# Patient Record
Sex: FEMALE | Race: WHITE | NOT HISPANIC OR LATINO | Employment: FULL TIME | URBAN - METROPOLITAN AREA
[De-identification: names, ages, dates, MRNs, and addresses within clinical notes are randomized per-mention and may not be internally consistent; named-entity substitution may affect disease eponyms.]

---

## 2017-02-27 ENCOUNTER — OFFICE VISIT (OUTPATIENT)
Dept: OBSTETRICS AND GYNECOLOGY | Age: 22
End: 2017-02-27

## 2017-02-27 VITALS
DIASTOLIC BLOOD PRESSURE: 78 MMHG | BODY MASS INDEX: 33.77 KG/M2 | HEIGHT: 60 IN | SYSTOLIC BLOOD PRESSURE: 120 MMHG | WEIGHT: 172 LBS

## 2017-02-27 DIAGNOSIS — Z30.40 CONTRACEPTIVE USE: ICD-10-CM

## 2017-02-27 DIAGNOSIS — A60.9 HSV (HERPES SIMPLEX VIRUS) ANOGENITAL INFECTION: ICD-10-CM

## 2017-02-27 DIAGNOSIS — Z01.419 ENCOUNTER FOR GYNECOLOGICAL EXAMINATION: Primary | ICD-10-CM

## 2017-02-27 PROCEDURE — 99395 PREV VISIT EST AGE 18-39: CPT | Performed by: OBSTETRICS & GYNECOLOGY

## 2017-02-27 RX ORDER — ETONOGESTREL AND ETHINYL ESTRADIOL 11.7; 2.7 MG/1; MG/1
1 INSERT, EXTENDED RELEASE VAGINAL
Qty: 1 EACH | Refills: 12 | Status: SHIPPED | OUTPATIENT
Start: 2017-02-27 | End: 2018-02-27

## 2017-02-27 RX ORDER — VALACYCLOVIR HYDROCHLORIDE 500 MG/1
500 TABLET, FILM COATED ORAL 2 TIMES DAILY
Qty: 6 TABLET | Refills: 4 | Status: SHIPPED | OUTPATIENT
Start: 2017-02-27 | End: 2017-03-02

## 2017-02-27 NOTE — PROGRESS NOTES
"Subjective     Chief Complaint   Patient presents with   • Annual Exam     no prob       History of Present Illness    Sandra Valadez is a 21 y.o.  who presents for annual exam.  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         Current contraception: condoms  History of abnormal Pap smear: yes - f/u negative  Received Gardasil immunization: no  Perform regular self breast exam: no  Family history of uterine or ovarian cancer: no  Family History of colon cancer: no  Family history of breast cancer: no    Mammogram: not indicated.  Colonoscopy: not indicated.  DEXA: not indicated.    Exercise: moderately active      The following portions of the patient's history were reviewed and updated as appropriate: allergies, current medications, past family history, past medical history, past social history, past surgical history and problem list.    Review of Systems    A comprehensive review of systems was negative.     Objective   Physical Exam    Visit Vitals   • /78   • Ht 60\" (152.4 cm)   • Wt 172 lb (78 kg)   • LMP 2017   • Breastfeeding No   • BMI 33.59 kg/m2       General:   alert, appears stated age, cooperative and no distress   Neck: no asymmetry, masses, or scars and thyroid normal to palpation   Heart: regular rate and rhythm, S1, S2 normal, no murmur, click, rub or gallop   Lungs: clear to auscultation bilaterally   Abdomen: soft, non-tender, without masses or organomegaly   Breast: inspection negative, no nipple discharge or bleeding, no masses or nodularity palpable   Vulva: normal, Bartholin's, Urethra, [place]'s normal   Vagina: " normal mucosa, normal discharge   Cervix: no lesions   Uterus: normal size, mobile, non-tender   Adnexa: normal adnexa and no mass, fullness, tenderness   Rectal: not indicated     Assessment/Plan   Sandra was seen today for annual exam.    Diagnoses and all orders for this visit:    Encounter for gynecological examination  -     IGP, CtNg, Rfx Aptima HPV ASCU    HSV (herpes simplex virus) anogenital infection  -     valACYclovir (VALTREX) 500 MG tablet; Take 1 tablet by mouth 2 (Two) Times a Day for 3 days.    Contraceptive use  -     etonogestrel-ethinyl estradiol (NUVARING) 0.12-0.015 MG/24HR vaginal ring; Insert 1 each into the vagina Every 28 (Twenty-Eight) Days.        All questions answered.  Breast self exam technique reviewed and patient encouraged to perform self-exam monthly.  Discussed healthy lifestyle modifications.  Recommended 30 minutes of aerobic exercise five times per week.    Pt aware risks of nuvaring to include DVT/PE/CVE, HTN and gallbladder disease, rx given with instruction on re-starting    Pt requests prn rx for valtrex, she denies any recent outbreaks but wants available

## 2017-03-01 LAB
C TRACH RRNA CVX QL NAA+PROBE: NEGATIVE
CONV .: NORMAL
CYTOLOGIST CVX/VAG CYTO: NORMAL
CYTOLOGY CVX/VAG DOC THIN PREP: NORMAL
DX ICD CODE: NORMAL
HIV 1 & 2 AB SER-IMP: NORMAL
N GONORRHOEA RRNA CVX QL NAA+PROBE: NEGATIVE
OTHER STN SPEC: NORMAL
PATH REPORT.FINAL DX SPEC: NORMAL
STAT OF ADQ CVX/VAG CYTO-IMP: NORMAL

## 2017-03-02 ENCOUNTER — TELEPHONE (OUTPATIENT)
Dept: OBSTETRICS AND GYNECOLOGY | Age: 22
End: 2017-03-02

## 2017-03-02 NOTE — TELEPHONE ENCOUNTER
----- Message from Noemi Mittal MD sent at 3/1/2017  6:10 PM EST -----  Negative pap and cultures, results routed to MA to call pt

## 2018-03-05 ENCOUNTER — OFFICE VISIT (OUTPATIENT)
Dept: OBSTETRICS AND GYNECOLOGY | Age: 23
End: 2018-03-05

## 2018-03-05 VITALS
DIASTOLIC BLOOD PRESSURE: 68 MMHG | SYSTOLIC BLOOD PRESSURE: 120 MMHG | WEIGHT: 162 LBS | HEIGHT: 60 IN | BODY MASS INDEX: 31.8 KG/M2

## 2018-03-05 DIAGNOSIS — Z01.419 VISIT FOR GYNECOLOGIC EXAMINATION: Primary | ICD-10-CM

## 2018-03-05 DIAGNOSIS — Z11.3 SCREEN FOR STD (SEXUALLY TRANSMITTED DISEASE): ICD-10-CM

## 2018-03-05 DIAGNOSIS — Z30.44 ENCOUNTER FOR SURVEILLANCE OF VAGINAL RING HORMONAL CONTRACEPTIVE DEVICE: ICD-10-CM

## 2018-03-05 DIAGNOSIS — A60.9 HSV (HERPES SIMPLEX VIRUS) ANOGENITAL INFECTION: ICD-10-CM

## 2018-03-05 PROCEDURE — 99395 PREV VISIT EST AGE 18-39: CPT | Performed by: OBSTETRICS & GYNECOLOGY

## 2018-03-05 RX ORDER — ETONOGESTREL AND ETHINYL ESTRADIOL 11.7; 2.7 MG/1; MG/1
1 INSERT, EXTENDED RELEASE VAGINAL
COMMUNITY
End: 2018-03-05 | Stop reason: SDUPTHER

## 2018-03-05 RX ORDER — VALACYCLOVIR HYDROCHLORIDE 500 MG/1
500 TABLET, FILM COATED ORAL 2 TIMES DAILY
Qty: 6 TABLET | Refills: 2 | Status: SHIPPED | OUTPATIENT
Start: 2018-03-05 | End: 2018-03-08

## 2018-03-05 RX ORDER — ETONOGESTREL AND ETHINYL ESTRADIOL 11.7; 2.7 MG/1; MG/1
1 INSERT, EXTENDED RELEASE VAGINAL
Qty: 1 EACH | Refills: 12 | Status: SHIPPED | OUTPATIENT
Start: 2018-03-05 | End: 2019-04-15 | Stop reason: SDUPTHER

## 2018-03-05 NOTE — PROGRESS NOTES
"Subjective     Chief Complaint   Patient presents with   • Annual Exam     no problems       History of Present Illness    Sandra Valadez is a 22 y.o.  who presents for annual exam.  Her menses are regular every 28-30 days, lasting 4-7 days, dysmenorrhea none   Using Nuvaring and having regular menses  Working in ER as RN and enjoying it at  [employer]  Only rare HSV outbreak, wants to continue valtrex prn only  Obstetric History:  OB History      Para Term  AB Living    4 3 3  1 3    SAB TAB Ectopic Multiple Live Births    1    3         Menstrual History:     Patient's last menstrual period was 2018.         Current contraception: NuvaRing vaginal inserts  History of abnormal Pap smear: yes - f/u negative  Received Gardasil immunization: no  Perform regular self breast exam: yes - occ  Family history of uterine or ovarian cancer: no  Family History of colon cancer: no  Family history of breast cancer: no    Mammogram: not indicated.  Colonoscopy: not indicated.  DEXA: not indicated.    Exercise: moderately active  Calcium/Vitamin D: adequate intake    The following portions of the patient's history were reviewed and updated as appropriate: allergies, current medications, past family history, past medical history, past social history, past surgical history and problem list.    Review of Systems    Review of Systems   Constitutional: Negative for fatigue.   Respiratory: Negative for shortness of breath.    Gastrointestinal: Negative for abdominal pain.   Genitourinary: Negative for dysuria. negative for abnormal bleeding  Neurological: Negative for headaches.   Psychiatric/Behavioral: Negative for dysphoric mood.         Objective   Physical Exam    /68  Ht 152.4 cm (60\")  Wt 73.5 kg (162 lb)  LMP 2018 Comment: nuvaring  BMI 31.64 kg/m2    General:   alert, appears stated age, cooperative and no distress   Neck: no asymmetry, masses, or scars and thyroid normal to " palpation   Heart: regular rate and rhythm, S1, S2 normal, no murmur, click, rub or gallop   Lungs: clear to auscultation bilaterally   Abdomen: soft, non-tender, without masses or organomegaly   Breast: inspection negative, no nipple discharge or bleeding, no masses or nodularity palpable and no axillary adenopathy, bilateral nipples have piercings   Vulva: normal, Bartholin's, Urethra, [place]'s normal   Vagina: normal mucosa, normal discharge   Cervix: no lesions and pap done   Uterus: normal size, mobile, non-tender, normal shape and consistency   Adnexa: normal adnexa and no mass, fullness, tenderness   Rectal: not indicated     Assessment/Plan   Sandra was seen today for annual exam.    Diagnoses and all orders for this visit:    Visit for gynecologic examination    Screen for STD (sexually transmitted disease)  -     IGP, CtNg, Rfx Aptima HPV ASCU  -     HIV-1 / O / 2 Ag / Antibody 4th Generation  -     RPR  -     Hepatitis B Surface Antigen    HSV (herpes simplex virus) anogenital infection  -     valACYclovir (VALTREX) 500 MG tablet; Take 1 tablet by mouth 2 (Two) Times a Day for 3 days.    Encounter for surveillance of vaginal ring hormonal contraceptive device  -     etonogestrel-ethinyl estradiol (NUVARING) 0.12-0.015 MG/24HR vaginal ring; Insert 1 each into the vagina Every 28 (Twenty-Eight) Days.        All questions answered.  Breast self exam technique reviewed and patient encouraged to perform self-exam monthly.  Discussed healthy lifestyle modifications.  Recommended 30 minutes of aerobic exercise five times per week.    Pt wants to continue Nuvaring for contraception. She understands risks to include DTV/PE/HTN and gallbladder disease    Advised pt that we will call with results, advised she call if she does not receive pap, cervical screening and bloodwork results

## 2018-03-06 ENCOUNTER — TELEPHONE (OUTPATIENT)
Dept: OBSTETRICS AND GYNECOLOGY | Age: 23
End: 2018-03-06

## 2018-03-06 LAB
HBV SURFACE AG SERPL QL IA: NEGATIVE
HIV 1+2 AB+HIV1 P24 AG SERPL QL IA: NON REACTIVE
RPR SER QL: NORMAL

## 2018-03-06 NOTE — TELEPHONE ENCOUNTER
----- Message from Noemi Mittal MD sent at 3/6/2018  8:55 AM EST -----  Call Sandra, HIV, Hep B and RPR are negative/non-reactive

## 2018-03-08 LAB
C TRACH RRNA CVX QL NAA+PROBE: NEGATIVE
CONV .: NORMAL
CYTOLOGIST CVX/VAG CYTO: NORMAL
CYTOLOGY CVX/VAG DOC THIN PREP: NORMAL
DX ICD CODE: NORMAL
HIV 1 & 2 AB SER-IMP: NORMAL
Lab: NORMAL
N GONORRHOEA RRNA CVX QL NAA+PROBE: NEGATIVE
OTHER STN SPEC: NORMAL
PATH REPORT.FINAL DX SPEC: NORMAL
STAT OF ADQ CVX/VAG CYTO-IMP: NORMAL

## 2018-03-12 ENCOUNTER — TELEPHONE (OUTPATIENT)
Dept: OBSTETRICS AND GYNECOLOGY | Age: 23
End: 2018-03-12

## 2018-03-12 NOTE — TELEPHONE ENCOUNTER
----- Message from Noemi Mittal MD sent at 3/12/2018 12:41 AM EDT -----  Call pt, pap, CZ and GC are negative

## 2018-04-13 ENCOUNTER — TELEPHONE (OUTPATIENT)
Dept: OBSTETRICS AND GYNECOLOGY | Age: 23
End: 2018-04-13

## 2018-04-13 RX ORDER — VALACYCLOVIR HYDROCHLORIDE 500 MG/1
500 TABLET, FILM COATED ORAL DAILY
Qty: 30 TABLET | Refills: 12 | Status: SHIPPED | OUTPATIENT
Start: 2018-04-13 | End: 2018-05-13

## 2018-04-13 NOTE — TELEPHONE ENCOUNTER
Dr Domínguez pt requests refill of Valtrex 500 takes for suppression, sent to pharm on file, nka. Please advise

## 2019-04-15 ENCOUNTER — OFFICE VISIT (OUTPATIENT)
Dept: OBSTETRICS AND GYNECOLOGY | Age: 24
End: 2019-04-15

## 2019-04-15 VITALS
WEIGHT: 172 LBS | BODY MASS INDEX: 33.77 KG/M2 | HEIGHT: 60 IN | SYSTOLIC BLOOD PRESSURE: 122 MMHG | DIASTOLIC BLOOD PRESSURE: 72 MMHG

## 2019-04-15 DIAGNOSIS — Z01.419 WELL WOMAN EXAM WITH ROUTINE GYNECOLOGICAL EXAM: ICD-10-CM

## 2019-04-15 DIAGNOSIS — Z30.44 ENCOUNTER FOR SURVEILLANCE OF VAGINAL RING HORMONAL CONTRACEPTIVE DEVICE: ICD-10-CM

## 2019-04-15 DIAGNOSIS — Z11.3 SCREEN FOR STD (SEXUALLY TRANSMITTED DISEASE): Primary | ICD-10-CM

## 2019-04-15 DIAGNOSIS — A60.9 HSV (HERPES SIMPLEX VIRUS) ANOGENITAL INFECTION: ICD-10-CM

## 2019-04-15 PROCEDURE — 99395 PREV VISIT EST AGE 18-39: CPT | Performed by: PHYSICIAN ASSISTANT

## 2019-04-15 RX ORDER — VALACYCLOVIR HYDROCHLORIDE 500 MG/1
500 TABLET, FILM COATED ORAL DAILY
Qty: 30 TABLET | Refills: 1 | Status: SHIPPED | OUTPATIENT
Start: 2019-04-15 | End: 2019-05-15

## 2019-04-15 RX ORDER — ETONOGESTREL AND ETHINYL ESTRADIOL 11.7; 2.7 MG/1; MG/1
1 INSERT, EXTENDED RELEASE VAGINAL
Qty: 3 EACH | Refills: 4 | Status: SHIPPED | OUTPATIENT
Start: 2019-04-15 | End: 2019-07-31

## 2019-04-15 NOTE — PROGRESS NOTES
Subjective     Chief Complaint   Patient presents with   • Gynecologic Exam     annual exam. last pap 2018 neg/no hpv       History of Present Illness    Sandra Valadez is a 23 y.o.  who presents for annual exam.    Pt here for her annual  She is doing well  Would like to have std testing done, likes to do it routinely  Also requests a pap smear, remote h/o abnormal  Not recently SA, it has been since last summer  Denies any issues otherwise    Has 3 children, boy and 2 girls.  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 Patient is not currently having periods (Reason: Oral contraceptives).         Current contraception: NuvaRing vaginal inserts  History of abnormal Pap smear: no  Received Gardasil immunization: no declines  Perform regular self breast exam: yes - occl  Family history of uterine or ovarian cancer: no  Family History of colon cancer: no  Family history of breast cancer: no    Mammogram: not indicated.  Colonoscopy: not indicated.  DEXA: not indicated.    Exercise: moderately active  Calcium/Vitamin D: adequate intake    The following portions of the patient's history were reviewed and updated as appropriate: allergies, current medications, past family history, past medical history, past social history, past surgical history and problem list.    Review of Systems   All other systems reviewed and are negative.      Review of Systems   Constitutional: Negative for fatigue.  "  Respiratory: Negative for shortness of breath.    Gastrointestinal: Negative for abdominal pain.   Genitourinary: Negative for dysuria.   Neurological: Negative for headaches.   Psychiatric/Behavioral: Negative for dysphoric mood.         Objective   Physical Exam    /72   Ht 152.4 cm (60\")   Wt 78 kg (172 lb)   Breastfeeding? No   BMI 33.59 kg/m²     General:   alert, appears stated age and cooperative   Neck: no adenopathy and thyroid normal to palpation   Heart: regular rate and rhythm   Lungs: clear to auscultation bilaterally   Abdomen: soft and nontender   Breast: inspection negative, no nipple discharge or bleeding, no masses or nodularity palpable and b nipple piercing   Vulva: normal   Vagina: normal mucosa, normal discharge   Cervix: no lesions   Uterus: normal size, non-tender   Adnexa: normal adnexa and no mass, fullness, tenderness   Rectal: not indicated     Assessment/Plan   Sandra was seen today for gynecologic exam.    Diagnoses and all orders for this visit:    Screen for STD (sexually transmitted disease)  -     RPR, Rfx Qn RPR / Confirm TP  -     Hepatitis B Surface Antigen  -     Hepatitis C Antibody  -     HIV-1 / O / 2 Ag / Antibody 4th Generation  -     IGP, CtNg, Rfx Aptima HPV ASCU    Well woman exam with routine gynecological exam  -     IGP, CtNg, Rfx Aptima HPV ASCU    Encounter for surveillance of vaginal ring hormonal contraceptive device  -     etonogestrel-ethinyl estradiol (NUVARING) 0.12-0.015 MG/24HR vaginal ring; Insert 1 each into the vagina Every 28 (Twenty-Eight) Days.    HSV (herpes simplex virus) anogenital infection    Other orders  -     valACYclovir (VALTREX) 500 MG tablet; Take 1 tablet by mouth Daily for 30 days.        All questions answered.  Breast self exam technique reviewed and patient encouraged to perform self-exam monthly.  Discussed healthy lifestyle modifications.  Recommended 30 minutes of aerobic exercise five times per week.  Discussed calcium " needs to prevent osteoporosis.    Pap ordered with STD testing, serum and pap  Valtrex sent in just in case for pt, no recent outbreaks  nuva ring refilled  Enc condoms with new partner  Call for any problems

## 2019-04-16 ENCOUNTER — TELEPHONE (OUTPATIENT)
Dept: OBSTETRICS AND GYNECOLOGY | Age: 24
End: 2019-04-16

## 2019-04-16 LAB
HBV SURFACE AG SERPL QL IA: NEGATIVE
HCV AB S/CO SERPL IA: <0.1 S/CO RATIO (ref 0–0.9)
HIV 1+2 AB+HIV1 P24 AG SERPL QL IA: NON REACTIVE
RPR SER QL: NON REACTIVE

## 2019-04-22 ENCOUNTER — OFFICE (OUTPATIENT)
Dept: URBAN - METROPOLITAN AREA CLINIC 42 | Facility: CLINIC | Age: 24
End: 2019-04-22

## 2019-04-22 VITALS — HEART RATE: 79 BPM | WEIGHT: 164 LBS | DIASTOLIC BLOOD PRESSURE: 87 MMHG | SYSTOLIC BLOOD PRESSURE: 127 MMHG

## 2019-04-22 DIAGNOSIS — R10.30 LOWER ABDOMINAL PAIN, UNSPECIFIED: ICD-10-CM

## 2019-04-22 DIAGNOSIS — R19.7 DIARRHEA, UNSPECIFIED: ICD-10-CM

## 2019-04-22 DIAGNOSIS — R19.4 CHANGE IN BOWEL HABIT: ICD-10-CM

## 2019-04-22 DIAGNOSIS — K59.00 CONSTIPATION, UNSPECIFIED: ICD-10-CM

## 2019-04-22 PROCEDURE — 99204 OFFICE O/P NEW MOD 45 MIN: CPT

## 2019-05-08 ENCOUNTER — TELEPHONE (OUTPATIENT)
Dept: OBSTETRICS AND GYNECOLOGY | Age: 24
End: 2019-05-08

## 2019-05-10 ENCOUNTER — OFFICE VISIT (OUTPATIENT)
Dept: OBSTETRICS AND GYNECOLOGY | Age: 24
End: 2019-05-10

## 2019-05-10 VITALS
SYSTOLIC BLOOD PRESSURE: 130 MMHG | BODY MASS INDEX: 33.18 KG/M2 | WEIGHT: 169 LBS | HEIGHT: 60 IN | DIASTOLIC BLOOD PRESSURE: 80 MMHG

## 2019-05-10 DIAGNOSIS — Z77.21 EXPOSURE TO BLOOD OR BODY FLUID: Primary | ICD-10-CM

## 2019-05-10 PROCEDURE — 99212 OFFICE O/P EST SF 10 MIN: CPT | Performed by: NURSE PRACTITIONER

## 2019-05-10 NOTE — PROGRESS NOTES
"Subjective   Sandra Valadez is a 24 y.o. female is being seen today for   Chief Complaint   Patient presents with   • Vaginal Discharge     Pt c/o some white discharge. She did use some OTC Monistat and it did seem to help. She recently had a condom break during IC and wants to make sure it's not something other than yeast.    .    History of Present Illness     Patient here for vaginal culture  She had a condom break last week and then some vaginal itching  She did use monistat and it helped but she wanted to check for STDs as well  She is using nuvaring for BC  She declines serum testing    The following portions of the patient's history were reviewed and updated as appropriate: allergies, current medications, past family history, past medical history, past social history, past surgical history and problem list.    /80   Ht 152.4 cm (60\")   Wt 76.7 kg (169 lb)   LMP 04/19/2019   BMI 33.01 kg/m²         Review of Systems   Constitutional: Negative.    HENT: Negative.    Eyes: Negative.    Respiratory: Negative.    Cardiovascular: Negative.    Gastrointestinal: Negative.    Endocrine: Negative.    Genitourinary: Positive for vaginal discharge.   Musculoskeletal: Negative.    Skin: Negative.    Allergic/Immunologic: Negative.    Neurological: Negative.    Hematological: Negative.    Psychiatric/Behavioral: Negative.        Objective   Physical Exam   Constitutional: She is oriented to person, place, and time. She appears well-developed and well-nourished.   Genitourinary: Vagina normal and uterus normal. Uterus is not tender. Cervix exhibits no motion tenderness, no discharge and no friability.   Neurological: She is alert and oriented to person, place, and time.   Skin: Skin is warm and dry.   Psychiatric: She has a normal mood and affect.         Assessment/Plan   Sandra was seen today for vaginal discharge.    Diagnoses and all orders for this visit:    Exposure to blood or body fluid  -     NuSwab VG+ " - Swab, Vagina        Vaginal culture sent.  Will call with results.

## 2019-05-13 LAB
A VAGINAE DNA VAG QL NAA+PROBE: ABNORMAL SCORE
BVAB2 DNA VAG QL NAA+PROBE: ABNORMAL SCORE
C ALBICANS DNA VAG QL NAA+PROBE: POSITIVE
C GLABRATA DNA VAG QL NAA+PROBE: NEGATIVE
C TRACH RRNA SPEC QL NAA+PROBE: NEGATIVE
MEGA1 DNA VAG QL NAA+PROBE: ABNORMAL SCORE
N GONORRHOEA RRNA SPEC QL NAA+PROBE: NEGATIVE
T VAGINALIS RRNA SPEC QL NAA+PROBE: POSITIVE

## 2019-05-14 ENCOUNTER — TELEPHONE (OUTPATIENT)
Dept: OBSTETRICS AND GYNECOLOGY | Age: 24
End: 2019-05-14

## 2019-05-14 RX ORDER — METRONIDAZOLE 500 MG/1
2000 TABLET ORAL ONCE
Qty: 4 TABLET | Refills: 0 | Status: SHIPPED | OUTPATIENT
Start: 2019-05-14 | End: 2019-05-14

## 2019-05-14 RX ORDER — FLUCONAZOLE 150 MG/1
150 TABLET ORAL
Qty: 2 TABLET | Refills: 0 | Status: SHIPPED | OUTPATIENT
Start: 2019-05-14 | End: 2019-07-31

## 2019-05-14 NOTE — TELEPHONE ENCOUNTER
----- Message from AGUSTÍN Peres sent at 5/14/2019  9:38 AM EDT -----  Notify + yeast and trich.  Partner needs to be treated as well and they should abstain from IC x 1 week after treatment. Medications have been sent to pharmacy.

## 2019-05-16 ENCOUNTER — TELEPHONE (OUTPATIENT)
Dept: OBSTETRICS AND GYNECOLOGY | Age: 24
End: 2019-05-16

## 2019-05-16 RX ORDER — TINIDAZOLE 500 MG/1
2 TABLET ORAL ONCE
Qty: 4 TABLET | Refills: 0 | Status: SHIPPED | OUTPATIENT
Start: 2019-05-16 | End: 2019-05-16

## 2019-05-16 NOTE — TELEPHONE ENCOUNTER
WAS PRESCRIBED FLAGYL, BUT CANNOT KEEP IT DOWN. I RECOMMENDED SHE TAKE WITH FOOD BUT SHE IS PREPPING FOR A COLONOSCOPY AND THE FLAGYL JUST REALLY UPSETS HER STOMACH. WHAT TO DO? ANTI-NAUSEA? CHANGE RX?

## 2019-05-16 NOTE — TELEPHONE ENCOUNTER
I would wait until after the colonoscopy to take if she is prepping now.  I will send in a new prescription and she should take with food.

## 2019-05-17 ENCOUNTER — ON CAMPUS - OUTPATIENT (OUTPATIENT)
Dept: URBAN - METROPOLITAN AREA HOSPITAL 91 | Facility: HOSPITAL | Age: 24
End: 2019-05-17
Payer: COMMERCIAL

## 2019-05-17 DIAGNOSIS — R15.2 FECAL URGENCY: ICD-10-CM

## 2019-05-17 DIAGNOSIS — R19.7 DIARRHEA, UNSPECIFIED: ICD-10-CM

## 2019-05-17 PROCEDURE — 45380 COLONOSCOPY AND BIOPSY: CPT

## 2019-06-05 ENCOUNTER — TELEPHONE (OUTPATIENT)
Dept: OBSTETRICS AND GYNECOLOGY | Age: 24
End: 2019-06-05

## 2019-06-05 RX ORDER — FLUCONAZOLE 150 MG/1
150 TABLET ORAL ONCE
Qty: 2 TABLET | Refills: 0 | Status: SHIPPED | OUTPATIENT
Start: 2019-06-05 | End: 2019-06-05

## 2019-06-05 NOTE — TELEPHONE ENCOUNTER
Dr Mittal pt requests Diflucan be swent [facility] for yeast infection as she reports vaginal itching & a thick discharge.

## 2019-07-19 ENCOUNTER — TELEPHONE (OUTPATIENT)
Dept: OBSTETRICS AND GYNECOLOGY | Age: 24
End: 2019-07-19

## 2019-07-19 RX ORDER — VALACYCLOVIR HYDROCHLORIDE 500 MG/1
500 TABLET, FILM COATED ORAL DAILY
Qty: 30 TABLET | Refills: 12 | Status: SHIPPED | OUTPATIENT
Start: 2019-07-19 | End: 2019-08-18

## 2019-07-19 NOTE — TELEPHONE ENCOUNTER
Dr Mittal pt seen Arabella back in April and was prescribed 2 months of Valtrex. Pt states Dr Mittal usually prescribes 12 months to hold her until her next annual. Pt tries to take it daily to prevent outbreaks. Pt she has enough for the week, but wanted to call in before she ran out. Please Advise

## 2019-07-31 ENCOUNTER — OFFICE VISIT (OUTPATIENT)
Dept: OBSTETRICS AND GYNECOLOGY | Age: 24
End: 2019-07-31

## 2019-07-31 ENCOUNTER — PROCEDURE VISIT (OUTPATIENT)
Dept: OBSTETRICS AND GYNECOLOGY | Age: 24
End: 2019-07-31

## 2019-07-31 VITALS
HEIGHT: 60 IN | DIASTOLIC BLOOD PRESSURE: 90 MMHG | SYSTOLIC BLOOD PRESSURE: 138 MMHG | WEIGHT: 163.2 LBS | BODY MASS INDEX: 32.04 KG/M2

## 2019-07-31 DIAGNOSIS — R35.0 URINARY FREQUENCY: ICD-10-CM

## 2019-07-31 DIAGNOSIS — N92.6 IRREGULAR MENSES: Primary | ICD-10-CM

## 2019-07-31 DIAGNOSIS — N92.3 INTERMENSTRUAL SPOTTING: Primary | ICD-10-CM

## 2019-07-31 DIAGNOSIS — Z13.9 SPECIAL SCREENING: ICD-10-CM

## 2019-07-31 PROBLEM — N92.0 INTERMENSTRUAL SPOTTING: Status: ACTIVE | Noted: 2019-07-31

## 2019-07-31 LAB
B-HCG UR QL: NEGATIVE
BILIRUB BLD-MCNC: NEGATIVE MG/DL
GLUCOSE UR STRIP-MCNC: NEGATIVE MG/DL
INTERNAL NEGATIVE CONTROL: NEGATIVE
INTERNAL POSITIVE CONTROL: POSITIVE
KETONES UR QL: NEGATIVE
LEUKOCYTE EST, POC: NEGATIVE
Lab: NORMAL
NITRITE UR-MCNC: NEGATIVE MG/ML
PH UR: 9 [PH] (ref 5–8)
PROT UR STRIP-MCNC: NEGATIVE MG/DL
RBC # UR STRIP: NEGATIVE /UL
SP GR UR: 1.01 (ref 1–1.03)
UROBILINOGEN UR QL: NORMAL

## 2019-07-31 PROCEDURE — 76830 TRANSVAGINAL US NON-OB: CPT | Performed by: OBSTETRICS & GYNECOLOGY

## 2019-07-31 PROCEDURE — 99213 OFFICE O/P EST LOW 20 MIN: CPT | Performed by: NURSE PRACTITIONER

## 2019-07-31 PROCEDURE — 81025 URINE PREGNANCY TEST: CPT | Performed by: NURSE PRACTITIONER

## 2019-07-31 PROCEDURE — 81002 URINALYSIS NONAUTO W/O SCOPE: CPT | Performed by: NURSE PRACTITIONER

## 2019-07-31 RX ORDER — METRONIDAZOLE 500 MG/1
2000 TABLET ORAL ONCE
Qty: 4 TABLET | Refills: 0 | Status: SHIPPED | OUTPATIENT
Start: 2019-07-31 | End: 2019-07-31

## 2019-07-31 NOTE — PROGRESS NOTES
[facility]  Routine Annual Visit    2019    Patient: Sandra Valadez          MR#:[id number]      History of Present Illness    24 y.o. female  who presents with complaints of intermenstrual spotting and spotting with intercourse since discontinuing her nuvaring 3 months ago. She was dx with  trich 5/10/19. Pt reports she and partner were treated before resuming IC. Sandra denies vaginal discharge, itching, odor. Denies pelvic pain, fever. She does report urinary frequency and was treated with bactrim for possible UTI but states culture returned negative. She is not using contraception as desires pregnancy. Daily prenatal vitamin recommended. Advised discontinue phentermine. Pap negative in April.    Patient's last menstrual period was 2019 (exact date).  Obstetric History:  OB History      Para Term  AB Living    4 3 3   1 3    SAB TAB Ectopic Molar Multiple Live Births    1         3         Menstrual History:     Patient's last menstrual period was 2019 (exact date).       Sexual History:       ________________________________________  Patient Active Problem List   Diagnosis   • HSV (herpes simplex virus) anogenital infection       Past Medical History:   Diagnosis Date   • Abnormal Pap smear of cervix    • Chlamydia    • Herpes        Past Surgical History:   Procedure Laterality Date   •  SECTION         Social History     Tobacco Use   Smoking Status Never Smoker   Smokeless Tobacco Never Used       Family History   Problem Relation Age of Onset   • Diabetes Maternal Uncle        Prior to Admission medications    Medication Sig Start Date End Date Taking? Authorizing Provider   phentermine 30 MG capsule Take 30 mg by mouth every morning.   Yes Emergency, Nurse Shelby, RN   valACYclovir (VALTREX) 500 MG tablet Take 1 tablet by mouth Daily for 30 days. 19 Yes Noemi Mittal MD   etonogestrel-ethinyl estradiol (NUVARING) 0.12-0.015  "MG/24HR vaginal ring Insert 1 each into the vagina Every 28 (Twenty-Eight) Days. 4/15/19   Arabella Clancy PA   fluconazole (DIFLUCAN) 150 MG tablet Take 1 tablet by mouth Every 3 (Three) Days. 5/14/19   Stephanie Elizabeth APRN       The following portions of the patient's history were reviewed and updated as appropriate: allergies, current medications, past family history, past medical history, past social history, past surgical history and problem list.    Review of Systems   Constitutional: Negative for chills, fatigue and fever.   Gastrointestinal: Negative for abdominal distention, abdominal pain, constipation and diarrhea.   Genitourinary: Positive for frequency and menstrual problem. Negative for decreased urine volume, difficulty urinating, dyspareunia, dysuria, enuresis, flank pain, genital sores, hematuria, pelvic pain, urgency, vaginal bleeding, vaginal discharge and vaginal pain.       Objective   Physical Exam    /90   Ht 152.4 cm (60\")   Wt 74 kg (163 lb 3.2 oz)   LMP 07/16/2019 (Exact Date)   Breastfeeding? No   BMI 31.87 kg/m²    BP Readings from Last 3 Encounters:   07/31/19 138/90   05/10/19 130/80   04/15/19 122/72      Wt Readings from Last 3 Encounters:   07/31/19 74 kg (163 lb 3.2 oz)   05/10/19 76.7 kg (169 lb)   04/15/19 78 kg (172 lb)        BMI: Estimated body mass index is 31.87 kg/m² as calculated from the following:    Height as of this encounter: 152.4 cm (60\").    Weight as of this encounter: 74 kg (163 lb 3.2 oz).      General:   alert, appears stated age, cooperative and no distress                   Vulva: normal   Vagina: normal mucosa, normal discharge   Cervix: no cervical motion tenderness and no lesions   Uterus: normal size, mobile, non-tender or normal shape and consistency   Adnexa: no mass, fullness, tenderness     Assessment:    1. Intermenstrual spotting- labs and US today as well as repeat nuswab. Recommend another round of flagyl to be safe and pt " agrees. Advised avoid alcohol during therapy.   2. Postcoital bleeding- US and ROBYN today; advised if persists will need further workup with Dr. Mittal.    Plan:    []  Rx:   []  Mammogram request made  []  PAP done  []  Occult fecal blood test ([facility])  []  Labs:   []  GC/Chl/TV  []  DEXA scan   []  Referral for colonoscopy:           AGUSTÍN Wan  7/31/2019 1:24 PM

## 2019-08-01 LAB
PROLACTIN SERPL-MCNC: 69.4 NG/ML (ref 4.8–23.3)
TSH SERPL DL<=0.005 MIU/L-ACNC: 2.17 MIU/ML (ref 0.27–4.2)

## 2019-08-02 ENCOUNTER — TELEPHONE (OUTPATIENT)
Dept: OBSTETRICS AND GYNECOLOGY | Age: 24
End: 2019-08-02

## 2019-08-05 ENCOUNTER — TELEPHONE (OUTPATIENT)
Dept: OBSTETRICS AND GYNECOLOGY | Age: 24
End: 2019-08-05

## 2019-08-05 PROBLEM — R79.89 ELEVATED PROLACTIN LEVEL: Status: ACTIVE | Noted: 2019-08-05

## 2019-08-05 LAB
A VAGINAE DNA VAG QL NAA+PROBE: NORMAL SCORE
BVAB2 DNA VAG QL NAA+PROBE: NORMAL SCORE
C ALBICANS DNA VAG QL NAA+PROBE: NEGATIVE
C GLABRATA DNA VAG QL NAA+PROBE: NEGATIVE
C TRACH RRNA SPEC QL NAA+PROBE: NEGATIVE
MEGA1 DNA VAG QL NAA+PROBE: NORMAL SCORE
N GONORRHOEA RRNA SPEC QL NAA+PROBE: NEGATIVE
T VAGINALIS RRNA SPEC QL NAA+PROBE: NEGATIVE

## 2019-08-05 NOTE — TELEPHONE ENCOUNTER
Spoke with patient and reviewed lab work with her. She discussed her elevated prolactin level with her PCP this morning and additional blood work was drawn. She states PCP will consider MRI once blood work returns.     Please call patient and schedule a GYN follow up with Dr. Mittal in 3-4 weeks to further discuss. Thank you.

## 2019-08-05 NOTE — TELEPHONE ENCOUNTER
(Kyrie ramos) is requesting her lab results to be reviewed and also is requesting to be seen today. Pt seen her prolactin was abnormal on MYCHART and states one of her doctors in ER advised if your prolactin level is high it could be a B9 tumor and they will go ahead and order an MRI to rule it out. Pt is wanting to come back in for redraws today as well. Please advise.     [phone]

## 2019-08-26 ENCOUNTER — OFFICE VISIT (OUTPATIENT)
Dept: OBSTETRICS AND GYNECOLOGY | Age: 24
End: 2019-08-26

## 2019-08-26 VITALS
HEIGHT: 60 IN | SYSTOLIC BLOOD PRESSURE: 110 MMHG | DIASTOLIC BLOOD PRESSURE: 60 MMHG | WEIGHT: 162.8 LBS | BODY MASS INDEX: 31.96 KG/M2

## 2019-08-26 DIAGNOSIS — N92.3 INTERMENSTRUAL SPOTTING: ICD-10-CM

## 2019-08-26 DIAGNOSIS — R79.89 ELEVATED PROLACTIN LEVEL: Primary | ICD-10-CM

## 2019-08-26 DIAGNOSIS — Z11.3 SCREENING EXAMINATION FOR STD (SEXUALLY TRANSMITTED DISEASE): ICD-10-CM

## 2019-08-26 PROCEDURE — 99213 OFFICE O/P EST LOW 20 MIN: CPT | Performed by: OBSTETRICS & GYNECOLOGY

## 2019-08-26 NOTE — PROGRESS NOTES
"Chief complaint:irreuglar bleeding    HPI  Sandra Valadez is a 24 y.o. female presents for f/u of visit for abnormal bleeding. She stopped Nuvaring in May and then noted some spotting between menses and occasionally after intercourse. She reports she continued to have some irregular bleeding after her last visit and it seems more noticeable after intercourse. She was evaluated by NP last month and had elevated prolactin. She notes this was repeated by her primary MD and levels were normal there. She has had a 10 lb wt reduction in the last few months with diet and exercise.    She does note that bleeding seems to be related to some irritation. She reports that his size is larger and she has some irritation vaginally/at introitus after intercourse. She is unsure if this could be source of symptoms.        The following portions of the patient's history were reviewed and updated as appropriate: allergies, current medications, past family history, past medical history, past social history, past surgical history and problem list.    Review of Systems  Constitutional: negative for fevers  Gastrointestinal: negative  Genitourinary:pos for irregular spotting, often following intercourse  Integument/breast: neg for galactorrhea or breast lump    /60   Ht 152.4 cm (60\")   Wt 73.8 kg (162 lb 12.8 oz)   LMP 08/16/2019 (Exact Date)   Breastfeeding? No   BMI 31.79 kg/m²         Physical Exam   Constitutional: She is oriented to person, place, and time. She appears well-developed and well-nourished.   HENT:   Head: Atraumatic.   Cardiovascular: Normal rate and regular rhythm.   Pulmonary/Chest: Effort normal.   Abdominal: Soft. There is no tenderness. There is no guarding.   Genitourinary:   Genitourinary Comments: Normal ext female genitalia, normal vagina, cervix without lesions, not friable, cultures obtained, no CMT or adnexal masses or tenderness, uterus normal and nontender   Neurological: She is alert and " oriented to person, place, and time.   Skin: Skin is warm and dry.   Psychiatric: She has a normal mood and affect. Her behavior is normal.           Sandra was seen today for follow-up.    Diagnoses and all orders for this visit:    Elevated prolactin level (CMS/[facility])  -     HCG, B-subunit, Quantitative ([facility] Only)  -     Prolactin    Intermenstrual spotting  -     HCG, B-subunit, Quantitative ([facility] Only)  -     Prolactin  -     Chlamydia trachomatis, Neisseria gonorrhoeae, Trichomonas vaginalis, PCR - Swab, Vagina    Screening examination for STD (sexually transmitted disease)  -     Chlamydia trachomatis, Neisseria gonorrhoeae, Trichomonas vaginalis, PCR - Swab, Vagina      Irregular bleeding; occasionally postcoital. Recent pap is negative. Repeated cultures since pt had trich in spring and having unprotected intercourse. Prolactin was elevated but unclear if related. She notes she had normal f/u labs with primary MD. Will repeat again with serum HCG.     Plan ongoing observation with colpo if spotting continues since mostly post-coital and pt agrees.

## 2019-08-27 ENCOUNTER — TELEPHONE (OUTPATIENT)
Dept: OBSTETRICS AND GYNECOLOGY | Age: 24
End: 2019-08-27

## 2019-08-27 LAB
HCG INTACT+B SERPL-ACNC: <0.5 MIU/ML
PROLACTIN SERPL-MCNC: 9.3 NG/ML (ref 4.8–23.3)

## 2019-08-27 NOTE — TELEPHONE ENCOUNTER
----- Message from Noemi Mittal MD sent at 8/27/2019  7:47 AM EDT -----  Call claudia, her HCG is negative and her prolactin is now normal

## 2019-08-28 ENCOUNTER — TELEPHONE (OUTPATIENT)
Dept: OBSTETRICS AND GYNECOLOGY | Age: 24
End: 2019-08-28

## 2019-08-28 LAB
C TRACH RRNA VAG QL NAA+PROBE: NEGATIVE
N GONORRHOEA RRNA VAG QL NAA+PROBE: NEGATIVE
T VAGINALIS RRNA VAG QL NAA+PROBE: NEGATIVE

## 2019-08-28 NOTE — TELEPHONE ENCOUNTER
----- Message from Noemi Mittal MD sent at 8/28/2019  8:39 AM EDT -----  Call pt, repeat cultures are negative/normal

## 2019-10-08 ENCOUNTER — OFFICE VISIT (OUTPATIENT)
Dept: OBSTETRICS AND GYNECOLOGY | Age: 24
End: 2019-10-08

## 2019-10-08 VITALS
SYSTOLIC BLOOD PRESSURE: 108 MMHG | DIASTOLIC BLOOD PRESSURE: 70 MMHG | BODY MASS INDEX: 32.79 KG/M2 | HEIGHT: 60 IN | WEIGHT: 167 LBS

## 2019-10-08 DIAGNOSIS — N92.3 INTERMENSTRUAL SPOTTING: ICD-10-CM

## 2019-10-08 DIAGNOSIS — Z01.812 PRE-PROCEDURE LAB EXAM: Primary | ICD-10-CM

## 2019-10-08 DIAGNOSIS — N93.0 POSTCOITAL BLEEDING: ICD-10-CM

## 2019-10-08 LAB
B-HCG UR QL: NEGATIVE
INTERNAL NEGATIVE CONTROL: NEGATIVE
INTERNAL POSITIVE CONTROL: POSITIVE
Lab: NORMAL

## 2019-10-08 PROCEDURE — 57454 BX/CURETT OF CERVIX W/SCOPE: CPT | Performed by: OBSTETRICS & GYNECOLOGY

## 2019-10-08 PROCEDURE — 81025 URINE PREGNANCY TEST: CPT | Performed by: OBSTETRICS & GYNECOLOGY

## 2019-10-08 NOTE — PROGRESS NOTES
Procedure   Procedures       Physical Exam   Genitourinary:       Genitourinary Comments: Wide area of AWE and non-staining with lugols outlined with red. Suggestive of JENNA 1 to 2. ECC and biopsies obtained from sites marked with red boxes       Colposcopy Procedure Note    Indications: postcoital bleeding  Procedure Details   The risks and benefits of the procedure and verbal and written informed consent obtained.    Speculum placed in vagina and excellent visualization of cervix achieved, cervix swabbed x 3 with acetic acid solution.    Findings:  Cervix: acetowhite lesion(s) noted at from 7 to 2:00  O'clock and extends posteriorly to posterior vaginal wall; cervix swabbed with Lugol's solution, endocervical curettage performed, cervical biopsies taken at 2, 7 10 o'clock, specimen labelled and sent to pathology and hemostasis achieved with Monsel's solution.    Specimens: ECC and cervical biopsies at 7, 10 and 2:00    Complications: none.    Patient tolerated the procedure well without complications.    Plan:  Specimens labelled and sent to Pathology.  Will base further treatment on Pathology findings.  Post biopsy instructions given to patient.  Will call pt with results. If LSIL or negative, plan f/u 3 months. Recommended pt hold on trying for pregnancy at this time until results return as HSIL would need to be treated. She verbally expresses understanding        10/8/2019  Noemi Mittal MD

## 2019-10-15 ENCOUNTER — TELEPHONE (OUTPATIENT)
Dept: OBSTETRICS AND GYNECOLOGY | Age: 24
End: 2019-10-15

## 2019-10-15 LAB
DX ICD CODE: NORMAL
PATH REPORT.FINAL DX SPEC: NORMAL
PATH REPORT.GROSS SPEC: NORMAL
PATH REPORT.RELEVANT HX SPEC: NORMAL
PATH REPORT.SITE OF ORIGIN SPEC: NORMAL
PATHOLOGIST NAME: NORMAL
PAYMENT PROCEDURE: NORMAL

## 2019-10-16 ENCOUNTER — OFFICE VISIT (OUTPATIENT)
Dept: OBSTETRICS AND GYNECOLOGY | Age: 24
End: 2019-10-16

## 2019-10-16 VITALS
BODY MASS INDEX: 32.67 KG/M2 | SYSTOLIC BLOOD PRESSURE: 112 MMHG | HEIGHT: 60 IN | DIASTOLIC BLOOD PRESSURE: 80 MMHG | WEIGHT: 166.4 LBS

## 2019-10-16 DIAGNOSIS — R79.89 ELEVATED PROLACTIN LEVEL: ICD-10-CM

## 2019-10-16 DIAGNOSIS — N92.3 INTERMENSTRUAL SPOTTING: Primary | ICD-10-CM

## 2019-10-16 PROCEDURE — 99213 OFFICE O/P EST LOW 20 MIN: CPT | Performed by: OBSTETRICS & GYNECOLOGY

## 2019-10-16 PROCEDURE — 81025 URINE PREGNANCY TEST: CPT | Performed by: OBSTETRICS & GYNECOLOGY

## 2019-10-16 NOTE — PROGRESS NOTES
"Chief complaint:vaginal spotting    HPI  Sandra Valadez is a 24 y.o. female presents for evaluation of spotting. She had colpo and spotting and discharge with monsels for about 5 days. This resolved but then she had another episode of spotting a few days ago. This has now resolved. She did have intercourse in prior to the recent spotting. She denies fevers or heavy bleeding after colposcopy. .         The following portions of the patient's history were reviewed and updated as appropriate: allergies, current medications, past family history, past medical history, past social history, past surgical history and problem list.    Review of Systems  Constitutional: negative for chills and fevers  Gastrointestinal: negative for abdominal pain  Genitourinary:positive for recent spotting after colposcopy    /80   Ht 152.4 cm (60\")   Wt 75.5 kg (166 lb 6.4 oz)   LMP 09/16/2019 (Approximate)   Breastfeeding? No   BMI 32.50 kg/m²         Physical Exam   Constitutional: She is oriented to person, place, and time. She appears well-developed and well-nourished.   Pulmonary/Chest: Effort normal.   Genitourinary:   Genitourinary Comments: Normal external female genitalia, normal vagina without blood, scant discharge, cervix without lesions, biopsy sites healed and hemostatic, no CMT   Neurological: She is alert and oriented to person, place, and time.   Skin: Skin is warm and dry.   Psychiatric: She has a normal mood and affect. Her behavior is normal.           Sandra was seen today for follow-up.    Diagnoses and all orders for this visit:    Intermenstrual spotting  -     POC Pregnancy, Urine    Elevated prolactin level (CMS/[facility])  -     Prolactin  -     Ambulatory Referral to Endocrinology      Biopsy sites have healed and pt denies bleeding today. Bleeding likely related to recent procedure. Pt was having irregular bleeding prior to procedure. Discussed biopsies with JENNA 1, this could be related. Recommend " observation and discussed. Plan pap 6 months.    Pt had prior elevated prolactin but then repeat was normal. Will repeat again. Pt requests appt with endocrine MD. She has family hx of thyroid disease as well and would like further evaluation.    Discussed risks of another pregnancy with patient. She has had C/S X 3. She is aware that 4th pregnancy and c/s with increased risks of hemorrhage, damage to organs and even death. She has considered but desires one additional pregnancy. Recommend she observe at this point as she has came off ocp's in spring. Recommended weight reduction as well and pt understands.

## 2019-10-29 DIAGNOSIS — Z30.44 ENCOUNTER FOR SURVEILLANCE OF VAGINAL RING HORMONAL CONTRACEPTIVE DEVICE: ICD-10-CM

## 2019-10-30 RX ORDER — ETONOGESTREL/ETHINYL ESTRADIOL .12-.015MG
RING, VAGINAL VAGINAL
Qty: 1 EACH | Refills: 14 | OUTPATIENT
Start: 2019-10-30

## 2019-10-31 ENCOUNTER — OFFICE (OUTPATIENT)
Dept: URBAN - METROPOLITAN AREA CLINIC 42 | Facility: CLINIC | Age: 24
End: 2019-10-31

## 2019-10-31 VITALS
SYSTOLIC BLOOD PRESSURE: 121 MMHG | HEART RATE: 81 BPM | WEIGHT: 167 LBS | TEMPERATURE: 98.3 F | DIASTOLIC BLOOD PRESSURE: 79 MMHG

## 2019-10-31 DIAGNOSIS — R11.0 NAUSEA: ICD-10-CM

## 2019-10-31 DIAGNOSIS — R10.30 LOWER ABDOMINAL PAIN, UNSPECIFIED: ICD-10-CM

## 2019-10-31 DIAGNOSIS — R19.7 DIARRHEA, UNSPECIFIED: ICD-10-CM

## 2019-10-31 PROCEDURE — 99213 OFFICE O/P EST LOW 20 MIN: CPT

## 2019-10-31 RX ORDER — AMITRIPTYLINE HYDROCHLORIDE 10 MG/1
10 TABLET, FILM COATED ORAL
Qty: 60 | Refills: 3 | Status: ACTIVE
Start: 2019-10-31

## 2019-10-31 RX ORDER — ONDANSETRON 4 MG/1
TABLET, ORALLY DISINTEGRATING ORAL
Qty: 30 | Refills: 1 | Status: ACTIVE

## 2019-11-04 ENCOUNTER — OFFICE VISIT (OUTPATIENT)
Dept: ENDOCRINOLOGY | Age: 24
End: 2019-11-04

## 2019-11-04 VITALS
BODY MASS INDEX: 32.2 KG/M2 | WEIGHT: 164 LBS | DIASTOLIC BLOOD PRESSURE: 78 MMHG | HEIGHT: 60 IN | SYSTOLIC BLOOD PRESSURE: 114 MMHG

## 2019-11-04 DIAGNOSIS — Z83.49 FAMILY HISTORY OF THYROID DISEASE IN MOTHER: ICD-10-CM

## 2019-11-04 DIAGNOSIS — E22.1 HYPERPROLACTINEMIA (HCC): Primary | ICD-10-CM

## 2019-11-04 DIAGNOSIS — N92.6 ABNORMAL MENSES: ICD-10-CM

## 2019-11-04 DIAGNOSIS — Z83.3 FAMILY HISTORY OF DIABETES MELLITUS: ICD-10-CM

## 2019-11-04 PROCEDURE — 99214 OFFICE O/P EST MOD 30 MIN: CPT | Performed by: NURSE PRACTITIONER

## 2019-11-04 RX ORDER — ONDANSETRON 4 MG/1
4 TABLET, FILM COATED ORAL 2 TIMES DAILY PRN
COMMUNITY
End: 2020-02-24

## 2019-11-04 NOTE — PROGRESS NOTES
Subjective   Sandra Valadez is a 24 y.o. female as new patient for elevated prolactin levels.     hyper prolactin and abnormal menses      Abnormal Lab   This is a chronic problem. The current episode started more than 1 month ago. The problem occurs constantly. The problem has been waxing and waning. Pertinent negatives include no coughing, fatigue, myalgias or rash. Nothing aggravates the symptoms. She has tried nothing for the symptoms. The treatment provided no relief.       The following portions of the patient's history were reviewed and updated as appropriate: current medications, past family history, past medical history, past social history, past surgical history and problem list.        Current Outpatient Medications:   •  ondansetron (ZOFRAN) 4 MG tablet, Take 4 mg by mouth 2 (Two) Times a Day As Needed for Nausea or Vomiting., Disp: , Rfl:   •  phentermine 30 MG capsule, Take 30 mg by mouth every morning., Disp: , Rfl:      Patient Active Problem List   Diagnosis   • HSV (herpes simplex virus) anogenital infection   • Intermenstrual spotting   • Elevated prolactin level (CMS/HCC)     Past Surgical History:   Procedure Laterality Date   •  SECTION       Past Medical History:   Diagnosis Date   • Abnormal Pap smear of cervix    • Chlamydia    • Herpes      Family History   Problem Relation Age of Onset   • Diabetes Maternal Uncle    • Breast cancer Paternal Grandmother    • Breast cancer Paternal Aunt    • Breast cancer Paternal Aunt    • Thyroid disease Mother        Review of Systems   Constitutional: Negative for fatigue.   HENT: Negative for trouble swallowing.    Eyes: Negative for visual disturbance.   Respiratory: Negative for cough.    Cardiovascular: Negative for palpitations.   Gastrointestinal: Positive for constipation and diarrhea.        Hx of IBS - colonoscopy in May   Endocrine: Positive for cold intolerance.   Genitourinary: Positive for menstrual problem and pelvic pain.    Musculoskeletal: Negative for myalgias.   Skin: Negative for rash.   Allergic/Immunologic: Negative.    Neurological: Negative for light-headedness.   Hematological: Bruises/bleeds easily.   Psychiatric/Behavioral: The patient is nervous/anxious.    All other systems reviewed and are negative.    Office Visit on 10/16/2019   Component Date Value Ref Range Status   • HCG, Urine, QL 10/16/2019 Negative  Negative Final   • Lot Number 10/16/2019 [id number]   Final   • Internal Positive Control 10/16/2019 Positive   Final   • Internal Negative Control 10/16/2019 Negative   Final     Wt Readings from Last 3 Encounters:   11/04/19 74.4 kg (164 lb)   10/16/19 75.5 kg (166 lb 6.4 oz)   10/08/19 75.8 kg (167 lb)     Temp Readings from Last 3 Encounters:   04/16/16 97.6 °F (36.4 °C) (Oral)     BP Readings from Last 3 Encounters:   11/04/19 114/78   10/16/19 112/80   10/08/19 108/70     Pulse Readings from Last 3 Encounters:   04/16/16 100       Objective   Physical Exam   Constitutional: She is oriented to person, place, and time. She appears well-developed and well-nourished. No distress.   HENT:   Head: Normocephalic and atraumatic.   Eyes: EOM are normal. Pupils are equal, round, and reactive to light.   Neck: Normal range of motion. Neck supple.   Cardiovascular: Normal rate, regular rhythm, normal heart sounds and intact distal pulses.   No murmur heard.  Pulmonary/Chest: Effort normal and breath sounds normal.   Abdominal: Soft. Bowel sounds are normal.   Musculoskeletal: Normal range of motion.   Neurological: She is alert and oriented to person, place, and time.   Skin: Skin is warm and dry. Capillary refill takes 2 to 3 seconds. She is not diaphoretic. No pallor.   Psychiatric: She has a normal mood and affect. Her behavior is normal. Judgment and thought content normal.   Nursing note and vitals reviewed.        Assessment/Plan   Sandra was seen today for abnormal lab.    Diagnoses and all orders for this  visit:    Hyperprolactinemia (CMS/[facility])  -     Comprehensive Metabolic Panel  -     C-Peptide  -     Insulin, Total  -     Hemoglobin A1c  -     Lipid Panel  -     Vitamin D 25 Hydroxy  -     Uric Acid  -     TSH  -     Thyroid Antibodies  -     T4, Free  -     T3, Free  -     T3  -     T4  -     PTH, Intact  -     Phosphorus  -     Calcitonin  -     Luteinizing Hormone  -     Follicle Stimulating Hormone  -     Estradiol  -     Prolactin  -     Thyroid Stimulating Immunoglobulin  -     Thyroid Peroxidase Antibody    Abnormal menses  -     Comprehensive Metabolic Panel  -     C-Peptide  -     Insulin, Total  -     Hemoglobin A1c  -     Lipid Panel  -     Vitamin D 25 Hydroxy  -     Uric Acid  -     TSH  -     Thyroid Antibodies  -     T4, Free  -     T3, Free  -     T3  -     T4  -     PTH, Intact  -     Phosphorus  -     Calcitonin  -     Luteinizing Hormone  -     Follicle Stimulating Hormone  -     Estradiol  -     Prolactin  -     Thyroid Stimulating Immunoglobulin  -     Thyroid Peroxidase Antibody    Family history of diabetes mellitus  -     Comprehensive Metabolic Panel  -     C-Peptide  -     Insulin, Total  -     Hemoglobin A1c  -     Lipid Panel  -     Vitamin D 25 Hydroxy  -     Uric Acid  -     TSH  -     Thyroid Antibodies  -     T4, Free  -     T3, Free  -     T3  -     T4  -     PTH, Intact  -     Phosphorus  -     Calcitonin  -     Luteinizing Hormone  -     Follicle Stimulating Hormone  -     Estradiol  -     Prolactin  -     Thyroid Stimulating Immunoglobulin  -     Thyroid Peroxidase Antibody    Family history of thyroid disease in mother  -     Comprehensive Metabolic Panel  -     C-Peptide  -     Insulin, Total  -     Hemoglobin A1c  -     Lipid Panel  -     Vitamin D 25 Hydroxy  -     Uric Acid  -     TSH  -     Thyroid Antibodies  -     T4, Free  -     T3, Free  -     T3  -     T4  -     PTH, Intact  -     Phosphorus  -     Calcitonin  -     Luteinizing Hormone  -     Follicle Stimulating  Hormone  -     Estradiol  -     Prolactin  -     Thyroid Stimulating Immunoglobulin  -     Thyroid Peroxidase Antibody               In Summary:  I met with this 24-year-old  female well-hydrated alert and oriented x3, she has no appearance of distress.  Reports to the office today per Dr. Mittal OB/GYN request for evaluation and treatment for hyper prolactin abnormal minces.  Patient reports she discontinued her contraceptive, NuvaRing in May to attempt to have a baby.  Since then she has had abnormal minces: Ovarian in time, heaviness in length.  Also reports spotting in between menses.  Medication, problem list, medical history, surgical history, family history and social history was evaluated.  Labs reported on July 31, 2019 shows PTH of 69.4 and a TSH of 2.1 redrew August 26, 2019 for 9.3.  At this time additional detailed laboratory work-up will be obtained and treat as indicated with results.  Instructed patient if parathyroid hormone returns elevated we will start on the medication Dostinex and obtain an MRI.  Great detail was explained with Hashimoto's thyroiditis, when treatment is indicated and when it is not.      Diagnoses and all orders for this visit:    1. Hyperprolactinemia (CMS/HCC) (Primary) -new problem to this provider.  Lab work was assessed with prior records.  Discussed treatment options and developed plan with patient.  The following changes today were: none lab work obtained.   -     Comprehensive Metabolic Panel  -     C-Peptide  -     Insulin, Total  -     Hemoglobin A1c  -     Lipid Panel  -     Vitamin D 25 Hydroxy  -     Uric Acid  -     TSH  -     Thyroid Antibodies  -     T4, Free  -     T3, Free  -     T3  -     T4  -     PTH, Intact  -     Phosphorus  -     Calcitonin  -     Luteinizing Hormone  -     Follicle Stimulating Hormone  -     Estradiol  -     Prolactin  -     Thyroid Stimulating Immunoglobulin  -     Thyroid Peroxidase Antibody    2. Abnormal menses- -new problem  to this provider.  Lab work was assessed with prior records.  Discussed treatment options and developed plan with patient.  The following changes today were: none lab work obtained.   -     Comprehensive Metabolic Panel  -     C-Peptide  -     Insulin, Total  -     Hemoglobin A1c  -     Lipid Panel  -     Vitamin D 25 Hydroxy  -     Uric Acid  -     TSH  -     Thyroid Antibodies  -     T4, Free  -     T3, Free  -     T3  -     T4  -     PTH, Intact  -     Phosphorus  -     Calcitonin  -     Luteinizing Hormone  -     Follicle Stimulating Hormone  -     Estradiol  -     Prolactin  -     Thyroid Stimulating Immunoglobulin  -     Thyroid Peroxidase Antibody    3. Family history of diabetes mellitus- -new problem to this provider.  Lab work was assessed with prior records.  Discussed treatment options and developed plan with patient.  The following changes today were: none lab work obtained.   -     Comprehensive Metabolic Panel  -     C-Peptide  -     Insulin, Total  -     Hemoglobin A1c  -     Lipid Panel  -     Vitamin D 25 Hydroxy  -     Uric Acid  -     TSH  -     Thyroid Antibodies  -     T4, Free  -     T3, Free  -     T3  -     T4  -     PTH, Intact  -     Phosphorus  -     Calcitonin  -     Luteinizing Hormone  -     Follicle Stimulating Hormone  -     Estradiol  -     Prolactin  -     Thyroid Stimulating Immunoglobulin  -     Thyroid Peroxidase Antibody    4. Family history of thyroid disease in mother- -new problem to this provider.  Lab work was assessed with prior records.  Discussed treatment options and developed plan with patient.  The following changes today were: none lab work obtained. -     Comprehensive Metabolic Panel  -     C-Peptide  -     Insulin, Total  -     Hemoglobin A1c  -     Lipid Panel  -     Vitamin D 25 Hydroxy  -     Uric Acid  -     TSH  -     Thyroid Antibodies  -     T4, Free  -     T3, Free  -     T3  -     T4  -     PTH, Intact  -     Phosphorus  -     Calcitonin  -      Luteinizing Hormone  -     Follicle Stimulating Hormone  -     Estradiol  -     Prolactin  -     Thyroid Stimulating Immunoglobulin  -     Thyroid Peroxidase Antibody               Return in about 3 months (around 2/4/2020), or if symptoms worsen or fail to improve, for Recheck. 3 months with Berenice-2 weeks prior for labs 6 months with Dr. Garcia-2 weeks prior for labs

## 2019-11-05 LAB
25(OH)D3+25(OH)D2 SERPL-MCNC: 30.9 NG/ML (ref 30–100)
ALBUMIN SERPL-MCNC: 4.8 G/DL (ref 3.5–5.2)
ALBUMIN/GLOB SERPL: 1.8 G/DL
ALP SERPL-CCNC: 55 U/L (ref 39–117)
ALT SERPL-CCNC: 9 U/L (ref 1–33)
AST SERPL-CCNC: 12 U/L (ref 1–32)
BILIRUB SERPL-MCNC: 0.3 MG/DL (ref 0.2–1.2)
BUN SERPL-MCNC: 16 MG/DL (ref 6–20)
BUN/CREAT SERPL: 24.2 (ref 7–25)
C PEPTIDE SERPL-MCNC: 4.1 NG/ML (ref 1.1–4.4)
CALCIT SERPL-MCNC: <2 PG/ML (ref 0–5)
CALCIUM SERPL-MCNC: 9.8 MG/DL (ref 8.6–10.5)
CHLORIDE SERPL-SCNC: 101 MMOL/L (ref 98–107)
CHOLEST SERPL-MCNC: 167 MG/DL (ref 0–200)
CO2 SERPL-SCNC: 27.8 MMOL/L (ref 22–29)
CREAT SERPL-MCNC: 0.66 MG/DL (ref 0.57–1)
ESTRADIOL SERPL-MCNC: 82.3 PG/ML
FSH SERPL-ACNC: 3.4 MIU/ML
GLOBULIN SER CALC-MCNC: 2.6 GM/DL
GLUCOSE SERPL-MCNC: 66 MG/DL (ref 65–99)
HBA1C MFR BLD: 5 % (ref 4.8–5.6)
HDLC SERPL-MCNC: 65 MG/DL (ref 40–60)
INSULIN SERPL-ACNC: 22.4 UIU/ML (ref 2.6–24.9)
INTERPRETATION: NORMAL
LDLC SERPL CALC-MCNC: 89 MG/DL (ref 0–100)
LH SERPL-ACNC: 4.2 MIU/ML
Lab: NORMAL
PHOSPHATE SERPL-MCNC: 3.4 MG/DL (ref 2.5–4.5)
POTASSIUM SERPL-SCNC: 4.5 MMOL/L (ref 3.5–5.2)
PROLACTIN SERPL-MCNC: 7.4 NG/ML (ref 4.8–23.3)
PROT SERPL-MCNC: 7.4 G/DL (ref 6–8.5)
PTH-INTACT SERPL-MCNC: 17 PG/ML (ref 15–65)
SODIUM SERPL-SCNC: 141 MMOL/L (ref 136–145)
T3 SERPL-MCNC: 92.8 NG/DL (ref 80–200)
T3FREE SERPL-MCNC: 2.7 PG/ML (ref 2–4.4)
T4 FREE SERPL-MCNC: 1.13 NG/DL (ref 0.93–1.7)
T4 SERPL-MCNC: 7.2 MCG/DL (ref 4.5–11.7)
THYROGLOB AB SERPL-ACNC: <1 IU/ML (ref 0–0.9)
THYROPEROXIDASE AB SERPL-ACNC: 14 IU/ML (ref 0–34)
TRIGL SERPL-MCNC: 65 MG/DL (ref 0–150)
TSH SERPL DL<=0.005 MIU/L-ACNC: 2.12 UIU/ML (ref 0.27–4.2)
TSI SER-ACNC: <0.1 IU/L (ref 0–0.55)
URATE SERPL-MCNC: 6 MG/DL (ref 2.4–5.7)
VLDLC SERPL CALC-MCNC: 13 MG/DL

## 2019-11-12 ENCOUNTER — TELEPHONE (OUTPATIENT)
Dept: OBSTETRICS AND GYNECOLOGY | Age: 24
End: 2019-11-12

## 2019-11-13 ENCOUNTER — TELEPHONE (OUTPATIENT)
Dept: OBSTETRICS AND GYNECOLOGY | Age: 24
End: 2019-11-13

## 2019-11-13 ENCOUNTER — OFFICE VISIT (OUTPATIENT)
Dept: OBSTETRICS AND GYNECOLOGY | Age: 24
End: 2019-11-13

## 2019-11-13 VITALS
DIASTOLIC BLOOD PRESSURE: 84 MMHG | BODY MASS INDEX: 31.8 KG/M2 | SYSTOLIC BLOOD PRESSURE: 128 MMHG | WEIGHT: 162 LBS | HEIGHT: 60 IN

## 2019-11-13 DIAGNOSIS — Z32.01 POSITIVE PREGNANCY TEST: Primary | ICD-10-CM

## 2019-11-13 PROCEDURE — 81025 URINE PREGNANCY TEST: CPT | Performed by: PHYSICIAN ASSISTANT

## 2019-11-13 PROCEDURE — 99213 OFFICE O/P EST LOW 20 MIN: CPT | Performed by: PHYSICIAN ASSISTANT

## 2019-11-13 NOTE — TELEPHONE ENCOUNTER
Patient went to the pharmacy and was told that the progesterone supp.mg is not in stock.That is a low dose and typically not in stock. Do you want to change dosage? Please advise.Patient is at the pharmacy. [phone].

## 2019-11-13 NOTE — PROGRESS NOTES
"Subjective     Chief Complaint   Patient presents with   • Gynecologic Exam     per dr yin do repeat hcg (at pcp 28) last period was 10/18 but only one day       Sandra Valadez is a 24 y.o.  whose LMP is Patient's last menstrual period was 10/18/2019. presents for f/u of early pregnancy  Was seen at PCP and had hcg level done that was only 28, progesterone was 8  She had a period for one day in October, on the   Started feeling sxatic for pregnancy and took a UPT on the  that was positive    She did rpt labs at her work yesterday ([employer]) and her quant was 50, progesterone was still 8  She has no pain or bleeding    Pt of Dr yin  New to me with this concern      No Additional Complaints Reported    The following portions of the patient's history were reviewed and updated as appropriate:vital signs, allergies, current medications, past family history, past medical history, past social history, past surgical history and problem list      Review of Systems   Genitourinary:positive for +pregnancy test but low hcg level     Objective      /84   Ht 152.4 cm (60\")   Wt 73.5 kg (162 lb)   LMP 10/18/2019   Breastfeeding? No   BMI 31.64 kg/m²     Physical Exam    General:   alert, comfortable and no distress   Heart: Not performed today   Lungs: Not performed today.   Breast: Not performed today   Neck: na   Abdomen: {Not performed today   CVA: Not performed today   Pelvis: Not performed today   Extremities: Not performed today   Neurologic: negative   Psychiatric: Normal affect, judgement, and mood       Lab Review   Labs: Urine pregnancy test negative    Imaging   No data reviewed    Assessment/Plan     ASSESSMENT  1. Positive pregnancy test        PLAN  1.   Orders Placed This Encounter   Procedures   • HCG, B-subunit, Quantitative   • Progesterone       2. Medications prescribed this encounter:        New Medications Ordered This Visit   Medications   • Progesterone 25 MG suppository     " Sig: Insert 1 tablet into the vagina Daily.     Dispense:  30 each     Refill:  1       3. Pt requests to start progesterone vaginally. I did explain that progesterone is helpful at maintaining a pregnancy but if developmentally a pregnancy is not proceeding appropriately, it won't prevent eventual miscarriage.  She is aware and pretty adamant about starting it.  Her UPT here today is negative. We will do quant labs today and potentially rpt again on Friday. I discussed possibly outcomes of miscarriage, early pregnancy and/or ectopic pregnancy. Disc ER for acute pain and/or excess bleeding. Pt verbalized understanding    Follow up: 1 week(s)    DIPESH Maurer  11/13/2019

## 2019-11-14 ENCOUNTER — TELEPHONE (OUTPATIENT)
Dept: OBSTETRICS AND GYNECOLOGY | Age: 24
End: 2019-11-14

## 2019-11-14 DIAGNOSIS — Z32.01 POSITIVE PREGNANCY TEST: Primary | ICD-10-CM

## 2019-11-14 LAB
HCG INTACT+B SERPL-ACNC: 89.24 MIU/ML
PROGEST SERPL-MCNC: 13 NG/ML

## 2019-11-14 NOTE — TELEPHONE ENCOUNTER
Called pt and discussed results of HCG from yesterday. She denies bleeding or significant pain. Pt notes she had a negative Urine HCG last Saturday but then had a faint positive the next day. She had a level with her primary and notes it was 28 on Monday. Level appears to be rising appropriately based on her reported initial value. Agree with repeat value tomorrow and pt will come in for this. Advise she call with pain or bleeding.

## 2019-11-14 NOTE — TELEPHONE ENCOUNTER
----- Message from DIPESH Stevens sent at 11/14/2019 11:16 AM EST -----  With the progesterone levels we are seeing, the progesterone supplementation is not warranted.  I would prefer she do the labs tomorrow as that will give us a clearer picture of which way her levels are trending

## 2019-11-14 NOTE — TELEPHONE ENCOUNTER
Result reviewed and in chart, quant is increasing and progesterone level is wnl.  I do want a rpt lab on Friday to compare quants from one lab as other levels have been drawn at different facilities. I placed the order for those labs to be done

## 2019-11-16 LAB
HCG INTACT+B SERPL-ACNC: 352.1 MIU/ML
PROGEST SERPL-MCNC: 19 NG/ML

## 2019-11-18 ENCOUNTER — TELEPHONE (OUTPATIENT)
Dept: OBSTETRICS AND GYNECOLOGY | Age: 24
End: 2019-11-18

## 2019-11-22 ENCOUNTER — OFFICE VISIT (OUTPATIENT)
Dept: OBSTETRICS AND GYNECOLOGY | Age: 24
End: 2019-11-22

## 2019-11-22 ENCOUNTER — PROCEDURE VISIT (OUTPATIENT)
Dept: OBSTETRICS AND GYNECOLOGY | Age: 24
End: 2019-11-22

## 2019-11-22 VITALS
SYSTOLIC BLOOD PRESSURE: 110 MMHG | WEIGHT: 162.6 LBS | DIASTOLIC BLOOD PRESSURE: 70 MMHG | BODY MASS INDEX: 31.92 KG/M2 | HEIGHT: 60 IN

## 2019-11-22 DIAGNOSIS — O36.80X0 PREGNANCY WITH UNCERTAIN FETAL VIABILITY, SINGLE OR UNSPECIFIED FETUS: Primary | ICD-10-CM

## 2019-11-22 DIAGNOSIS — O36.80X0 ENCOUNTER TO DETERMINE FETAL VIABILITY OF PREGNANCY, SINGLE OR UNSPECIFIED FETUS: Primary | ICD-10-CM

## 2019-11-22 PROCEDURE — 76817 TRANSVAGINAL US OBSTETRIC: CPT | Performed by: OBSTETRICS & GYNECOLOGY

## 2019-11-22 PROCEDURE — 99213 OFFICE O/P EST LOW 20 MIN: CPT | Performed by: OBSTETRICS & GYNECOLOGY

## 2019-11-22 RX ORDER — PRENATAL VIT NO.126/IRON/FOLIC 28MG-0.8MG
1 TABLET ORAL DAILY
COMMUNITY

## 2019-11-22 RX ORDER — VALACYCLOVIR HYDROCHLORIDE 500 MG/1
500 TABLET, FILM COATED ORAL 2 TIMES DAILY
COMMUNITY
End: 2020-02-24

## 2019-11-22 NOTE — PROGRESS NOTES
"Chief complaint: early pregnancy u/s    HPI  Sandra Valadez is a 24 y.o. female  Presents for f/u u/s for early pregnancy.         The following portions of the patient's history were reviewed and updated as appropriate: allergies, current medications, past family history, past medical history, past social history, past surgical history and problem list.    Review of Systems  Constitutional: positive for mild fatigue  Gastrointestinal: positive for nausea  Genitourinary:neg for vag bleeding or pelvic pain  Integument/breast: positive for breast tenderness    /70   Ht 152.4 cm (60\")   Wt 73.8 kg (162 lb 9.6 oz)   BMI 31.76 kg/m²         Physical Exam   Constitutional: She is oriented to person, place, and time. She appears well-developed and well-nourished.   Abdominal: Soft. There is no tenderness. There is no guarding.   Neurological: She is alert and oriented to person, place, and time.   Skin: Skin is warm and dry.   Psychiatric: She has a normal mood and affect. Her behavior is normal.       tv u/s: intrauterine gestational sac with yolk sac, no fetal pole noted yet. Ovaries appear normal      Sandra was seen today for gynecologic exam.    Diagnoses and all orders for this visit:    Pregnancy with uncertain fetal viability, single or unspecified fetus      Intrauterine gestational sac noted. Right ovary with echogenic area. Plan f/u u/s 2 weeks  Reviewed reji.          "

## 2019-11-23 LAB
ABO GROUP BLD: (no result)
BASOPHILS # BLD AUTO: 0 X10E3/UL (ref 0–0.2)
BASOPHILS NFR BLD AUTO: 0 %
BLD GP AB SCN SERPL QL: NEGATIVE
EOSINOPHIL # BLD AUTO: 0.1 X10E3/UL (ref 0–0.4)
EOSINOPHIL NFR BLD AUTO: 1 %
ERYTHROCYTE [DISTWIDTH] IN BLOOD BY AUTOMATED COUNT: 13.3 % (ref 12.3–15.4)
HBV SURFACE AG SERPL QL IA: NEGATIVE
HCT VFR BLD AUTO: 38.6 % (ref 34–46.6)
HCV AB S/CO SERPL IA: <0.1 S/CO RATIO (ref 0–0.9)
HGB BLD-MCNC: 12.9 G/DL (ref 11.1–15.9)
HIV 1+2 AB+HIV1 P24 AG SERPL QL IA: NON REACTIVE
IMM GRANULOCYTES # BLD AUTO: 0 X10E3/UL (ref 0–0.1)
IMM GRANULOCYTES NFR BLD AUTO: 0 %
LYMPHOCYTES # BLD AUTO: 1.9 X10E3/UL (ref 0.7–3.1)
LYMPHOCYTES NFR BLD AUTO: 28 %
MCH RBC QN AUTO: 29.2 PG (ref 26.6–33)
MCHC RBC AUTO-ENTMCNC: 33.4 G/DL (ref 31.5–35.7)
MCV RBC AUTO: 87 FL (ref 79–97)
MONOCYTES # BLD AUTO: 0.6 X10E3/UL (ref 0.1–0.9)
MONOCYTES NFR BLD AUTO: 10 %
NEUTROPHILS # BLD AUTO: 4 X10E3/UL (ref 1.4–7)
NEUTROPHILS NFR BLD AUTO: 61 %
PLATELET # BLD AUTO: 394 X10E3/UL (ref 150–450)
RBC # BLD AUTO: 4.42 X10E6/UL (ref 3.77–5.28)
RH BLD: POSITIVE
RPR SER QL: NON REACTIVE
RUBV IGG SERPL IA-ACNC: 1.24 INDEX
WBC # BLD AUTO: 6.6 X10E3/UL (ref 3.4–10.8)

## 2019-11-25 ENCOUNTER — TELEPHONE (OUTPATIENT)
Dept: OBSTETRICS AND GYNECOLOGY | Age: 24
End: 2019-11-25

## 2019-12-09 ENCOUNTER — ROUTINE PRENATAL (OUTPATIENT)
Dept: OBSTETRICS AND GYNECOLOGY | Age: 24
End: 2019-12-09

## 2019-12-09 ENCOUNTER — PROCEDURE VISIT (OUTPATIENT)
Dept: OBSTETRICS AND GYNECOLOGY | Age: 24
End: 2019-12-09

## 2019-12-09 VITALS — BODY MASS INDEX: 32.5 KG/M2 | DIASTOLIC BLOOD PRESSURE: 68 MMHG | SYSTOLIC BLOOD PRESSURE: 110 MMHG | WEIGHT: 166.4 LBS

## 2019-12-09 DIAGNOSIS — R82.71 ASYMPTOMATIC BACTERIURIA IN PREGNANCY: ICD-10-CM

## 2019-12-09 DIAGNOSIS — Z3A.01 7 WEEKS GESTATION OF PREGNANCY: Primary | ICD-10-CM

## 2019-12-09 DIAGNOSIS — O99.891 ASYMPTOMATIC BACTERIURIA IN PREGNANCY: ICD-10-CM

## 2019-12-09 DIAGNOSIS — O36.80X0 ENCOUNTER TO DETERMINE FETAL VIABILITY OF PREGNANCY, SINGLE OR UNSPECIFIED FETUS: Primary | ICD-10-CM

## 2019-12-09 DIAGNOSIS — Z11.3 SCREEN FOR STD (SEXUALLY TRANSMITTED DISEASE): ICD-10-CM

## 2019-12-09 DIAGNOSIS — O21.9 NAUSEA AND VOMITING DURING PREGNANCY: ICD-10-CM

## 2019-12-09 PROBLEM — Z98.891 HISTORY OF CESAREAN DELIVERY: Status: ACTIVE | Noted: 2019-12-09

## 2019-12-09 PROBLEM — N92.0 INTERMENSTRUAL SPOTTING: Status: RESOLVED | Noted: 2019-07-31 | Resolved: 2019-12-09

## 2019-12-09 PROBLEM — N92.3 INTERMENSTRUAL SPOTTING: Status: RESOLVED | Noted: 2019-07-31 | Resolved: 2019-12-09

## 2019-12-09 LAB
CLARITY, POC: CLEAR
COLOR UR: YELLOW
GLUCOSE UR STRIP-MCNC: NEGATIVE MG/DL
PROT UR STRIP-MCNC: NEGATIVE MG/DL
VZV IGG SER QL: NORMAL

## 2019-12-09 PROCEDURE — 81002 URINALYSIS NONAUTO W/O SCOPE: CPT | Performed by: OBSTETRICS & GYNECOLOGY

## 2019-12-09 PROCEDURE — 0501F PRENATAL FLOW SHEET: CPT | Performed by: OBSTETRICS & GYNECOLOGY

## 2019-12-09 PROCEDURE — 76817 TRANSVAGINAL US OBSTETRIC: CPT | Performed by: OBSTETRICS & GYNECOLOGY

## 2019-12-09 NOTE — PROGRESS NOTES
Pt presents for routine f/u. She denies pelvic pain or vaginal bleeding. She has mild nausea and some emesis.   U/s: viable IUP at 7 weeks  A/p: pt interested in aneuploidy and carrier screening, will order at f/u    rx sent for bonjesta. Pt previously taking zofran but stopped with pregnancy, recommend she avoid and reviewed risks and benefits    Hx c/s X 3: pt plans repeat c/s with tubal

## 2019-12-10 ENCOUNTER — DOCUMENTATION (OUTPATIENT)
Dept: OBSTETRICS AND GYNECOLOGY | Age: 24
End: 2019-12-10

## 2019-12-10 NOTE — PROGRESS NOTES
I spoke with patient regarding her RX for Bonjesta (ins rejected).  Pt says she will take Unisom and B-6,  as a substitute for her nausea.

## 2019-12-12 LAB
C TRACH RRNA SPEC QL NAA+PROBE: NEGATIVE
N GONORRHOEA RRNA SPEC QL NAA+PROBE: NEGATIVE
T VAGINALIS DNA SPEC QL NAA+PROBE: NEGATIVE

## 2019-12-13 ENCOUNTER — TELEPHONE (OUTPATIENT)
Dept: OBSTETRICS AND GYNECOLOGY | Age: 24
End: 2019-12-13

## 2019-12-13 PROBLEM — O23.40 URINARY TRACT INFECTION IN MOTHER DURING PREGNANCY, ANTEPARTUM: Status: ACTIVE | Noted: 2019-12-13

## 2019-12-13 LAB
BACTERIA UR CULT: ABNORMAL
BACTERIA UR CULT: ABNORMAL
OTHER ANTIBIOTIC SUSC ISLT: ABNORMAL

## 2019-12-13 RX ORDER — CEPHALEXIN 500 MG/1
500 CAPSULE ORAL 3 TIMES DAILY
Qty: 27 CAPSULE | Refills: 0 | Status: SHIPPED | OUTPATIENT
Start: 2019-12-13 | End: 2019-12-20

## 2019-12-13 NOTE — TELEPHONE ENCOUNTER
Called pt to review results. No answer. Unable to leave message. Sent rx for keflex as she has UTI and results suggest it would be susceptible to keflex.

## 2019-12-23 ENCOUNTER — TELEPHONE (OUTPATIENT)
Dept: OBSTETRICS AND GYNECOLOGY | Age: 24
End: 2019-12-23

## 2019-12-23 NOTE — TELEPHONE ENCOUNTER
Called pt and reviewed symptoms. She is not vomiting constantly but notes more n/v when she takes antibiotics. She has kept most of the antibiotics down without issue. She has more n/v yesterday but kept down a tablet today. She is being treated for asymptomatic bacteruria. She denies fever/back pain. She did try anti-emetic yesterday but this did not help with keeping down antibiotic. She is improved today and has finished over half of the 7 day course so she will complete as she can and plan timmy at f/u. She will call with any uti or pyelo symptoms.

## 2019-12-23 NOTE — TELEPHONE ENCOUNTER
Patient is 9 wks pregnant and requesting a rx for Phenergan.She was prescribed medicine for a uti but unable to take it due to the nausea.[facility] pharmacy on file.

## 2019-12-27 ENCOUNTER — TELEPHONE (OUTPATIENT)
Dept: OBSTETRICS AND GYNECOLOGY | Age: 24
End: 2019-12-27

## 2019-12-27 NOTE — TELEPHONE ENCOUNTER
Called pt and discussed her shingles exposure. She has been vaccinated for chicken pox and she did not come in direct contact with shingles lesions. Discussed this would be low risk for issue but advised her to report any symptoms if noted.

## 2019-12-30 ENCOUNTER — ROUTINE PRENATAL (OUTPATIENT)
Dept: OBSTETRICS AND GYNECOLOGY | Age: 24
End: 2019-12-30

## 2019-12-30 VITALS — DIASTOLIC BLOOD PRESSURE: 60 MMHG | SYSTOLIC BLOOD PRESSURE: 116 MMHG | BODY MASS INDEX: 33.44 KG/M2 | WEIGHT: 171.2 LBS

## 2019-12-30 DIAGNOSIS — Z3A.10 10 WEEKS GESTATION OF PREGNANCY: Primary | ICD-10-CM

## 2019-12-30 DIAGNOSIS — Z98.891 HISTORY OF CESAREAN DELIVERY: ICD-10-CM

## 2019-12-30 LAB
CLARITY, POC: CLEAR
COLOR UR: YELLOW
EXTERNAL CYSTIC FIBROSIS: NEGATIVE
EXTERNAL NIPT: NORMAL
GLUCOSE UR STRIP-MCNC: NEGATIVE MG/DL
PROT UR STRIP-MCNC: NEGATIVE MG/DL

## 2019-12-30 PROCEDURE — 0502F SUBSEQUENT PRENATAL CARE: CPT | Performed by: OBSTETRICS & GYNECOLOGY

## 2019-12-30 PROCEDURE — 81002 URINALYSIS NONAUTO W/O SCOPE: CPT | Performed by: OBSTETRICS & GYNECOLOGY

## 2019-12-30 NOTE — PROGRESS NOTES
Pt presents for routine f/u. She denies any issues today. She denies bleeding or pain. Her n/v is improved. She took antibiotics for UTI. She denies pyelo symptoms  O: Fetal heart activity noted with bedside u/s then heard with doppler  A/p: pt desires to proceed with aneuploidy and carrier screen today. Advised to call if no results in 10 days    rtc 4 weeks    Prior c/s X 3: pt plans repeat c/s with tubal sterilization    Pt had flu shot

## 2020-01-06 ENCOUNTER — TELEPHONE (OUTPATIENT)
Dept: OBSTETRICS AND GYNECOLOGY | Age: 25
End: 2020-01-06

## 2020-01-06 NOTE — TELEPHONE ENCOUNTER
Called pt back and reviewed symptoms. She has appt with urgent care for cough today. She went over weekend but was not tested for flu. Recommend flu screen and advised she can take tamiflu if she is positive. Advised she to go ER for shortness of breath or persistent fever

## 2020-01-06 NOTE — TELEPHONE ENCOUNTER
Patient calling 11w3d pregnant c/o cough , sore throat , fever 100.5 .  Patient went this weekend to immediate  care , tried everything what is available OTC and nothing is helping  .  Pharmacy in file .  Please advise ?

## 2020-01-07 ENCOUNTER — TELEPHONE (OUTPATIENT)
Dept: OBSTETRICS AND GYNECOLOGY | Age: 25
End: 2020-01-07

## 2020-01-07 NOTE — TELEPHONE ENCOUNTER
Patient is 11 weeks, 4 days gestation. Was diagnosed with flu yesterday.  Has severe nasal congestion, stated has been using Flonase, ocean drops and claritin and no help.  Stated when she turns her head she gets a severe headache.  Went over the list of approved medications in pregnancy for congestion, however, patient wants a nasal spray.

## 2020-01-08 NOTE — TELEPHONE ENCOUNTER
She is already using a nasal spray that I would recommend. She can also use sinus nasal spray/afrin prn. Can also do tylenol cold and sinus as well. However, if her sx's persist, she may need to be treated for a sinusitis-can f/u with PCP for this.

## 2020-01-08 NOTE — TELEPHONE ENCOUNTER
Pt notified and she says that she had already spoke with Dr. Mittal yesterday.  She voices understanding.

## 2020-01-09 ENCOUNTER — TELEPHONE (OUTPATIENT)
Dept: OBSTETRICS AND GYNECOLOGY | Age: 25
End: 2020-01-09

## 2020-01-09 NOTE — TELEPHONE ENCOUNTER
Voicemail received from Vianey Dhillon in regards to this Dr Mittal pt. There is some confusion with the ICD-10 provided for pt's [employer] testing on the [employer] form.     Callback number [phone] Case # [id number]

## 2020-01-31 ENCOUNTER — TELEPHONE (OUTPATIENT)
Dept: OBSTETRICS AND GYNECOLOGY | Age: 25
End: 2020-01-31

## 2020-02-03 ENCOUNTER — TELEPHONE (OUTPATIENT)
Dept: OBSTETRICS AND GYNECOLOGY | Age: 25
End: 2020-02-03

## 2020-02-03 ENCOUNTER — ROUTINE PRENATAL (OUTPATIENT)
Dept: OBSTETRICS AND GYNECOLOGY | Age: 25
End: 2020-02-03

## 2020-02-03 VITALS — BODY MASS INDEX: 33.2 KG/M2 | SYSTOLIC BLOOD PRESSURE: 118 MMHG | DIASTOLIC BLOOD PRESSURE: 68 MMHG | WEIGHT: 170 LBS

## 2020-02-03 DIAGNOSIS — Z13.89 SCREENING FOR BLOOD OR PROTEIN IN URINE: Primary | ICD-10-CM

## 2020-02-03 DIAGNOSIS — Z3A.15 15 WEEKS GESTATION OF PREGNANCY: ICD-10-CM

## 2020-02-03 DIAGNOSIS — Z98.891 HISTORY OF CESAREAN DELIVERY: ICD-10-CM

## 2020-02-03 DIAGNOSIS — R00.0 TACHYCARDIA: ICD-10-CM

## 2020-02-03 DIAGNOSIS — R00.0 TACHYCARDIA: Primary | ICD-10-CM

## 2020-02-03 LAB
CLARITY, POC: CLEAR
COLOR UR: YELLOW
GLUCOSE UR STRIP-MCNC: NEGATIVE MG/DL
PROT UR STRIP-MCNC: NEGATIVE MG/DL

## 2020-02-03 PROCEDURE — 0502F SUBSEQUENT PRENATAL CARE: CPT | Performed by: PHYSICIAN ASSISTANT

## 2020-02-03 PROCEDURE — 81002 URINALYSIS NONAUTO W/O SCOPE: CPT | Performed by: PHYSICIAN ASSISTANT

## 2020-02-03 NOTE — PROGRESS NOTES
Chief Complaint   Patient presents with   • Pregnancy Problem     Pt c/o elevated heart rate       HPI: 24 y.o.  at 15w3d gestation  She is recovering from the flu  Feels better but notes an elevated HR while at work  Denies caffeine use and trying to stay hydrated  Was feeling fluttering  On monitor and noted to have elevated HR of 200 bpm  Declined further w/u  Is taking PNV but doesn't think it has iron in it  Will check CBC and TSH today  HR 79 today and O2 is 100%    Vitals:    20 1148   BP: 118/68   Weight: 77.1 kg (170 lb)       ROS:  GI:  Negative  : NA  Pulmonary: Negative   CV hrr, no murmurs and no elevations noted  A/P  1. Intrauterine pregnancy at 15w3d   2. Pregnancy Risk:  NORMAL    Sandra was seen today for pregnancy problem.    Diagnoses and all orders for this visit:    Screening for blood or protein in urine  -     POC Urinalysis Dipstick    Tachycardia  -     CBC & Differential  -     TSH    History of  delivery        -----------------------  PLAN:   Return in about 4 weeks (around 3/2/2020) for anatomy scan.      DIPESH Maurer  2/3/2020 12:08 PM

## 2020-02-04 ENCOUNTER — TELEPHONE (OUTPATIENT)
Dept: OBSTETRICS AND GYNECOLOGY | Age: 25
End: 2020-02-04

## 2020-02-04 LAB
BASOPHILS # BLD AUTO: 0.02 10*3/MM3 (ref 0–0.2)
BASOPHILS NFR BLD AUTO: 0.2 % (ref 0–1.5)
EOSINOPHIL # BLD AUTO: 0.05 10*3/MM3 (ref 0–0.4)
EOSINOPHIL NFR BLD AUTO: 0.6 % (ref 0.3–6.2)
ERYTHROCYTE [DISTWIDTH] IN BLOOD BY AUTOMATED COUNT: 12.8 % (ref 12.3–15.4)
HCT VFR BLD AUTO: 36.4 % (ref 34–46.6)
HGB BLD-MCNC: 12.1 G/DL (ref 12–15.9)
IMM GRANULOCYTES # BLD AUTO: 0.04 10*3/MM3 (ref 0–0.05)
IMM GRANULOCYTES NFR BLD AUTO: 0.5 % (ref 0–0.5)
LYMPHOCYTES # BLD AUTO: 2.17 10*3/MM3 (ref 0.7–3.1)
LYMPHOCYTES NFR BLD AUTO: 25.4 % (ref 19.6–45.3)
MCH RBC QN AUTO: 29.5 PG (ref 26.6–33)
MCHC RBC AUTO-ENTMCNC: 33.2 G/DL (ref 31.5–35.7)
MCV RBC AUTO: 88.8 FL (ref 79–97)
MONOCYTES # BLD AUTO: 0.64 10*3/MM3 (ref 0.1–0.9)
MONOCYTES NFR BLD AUTO: 7.5 % (ref 5–12)
NEUTROPHILS # BLD AUTO: 5.63 10*3/MM3 (ref 1.7–7)
NEUTROPHILS NFR BLD AUTO: 65.8 % (ref 42.7–76)
NRBC BLD AUTO-RTO: 0 /100 WBC (ref 0–0.2)
PLATELET # BLD AUTO: 294 10*3/MM3 (ref 140–450)
RBC # BLD AUTO: 4.1 10*6/MM3 (ref 3.77–5.28)
TSH SERPL DL<=0.005 MIU/L-ACNC: 1.66 UIU/ML (ref 0.27–4.2)
WBC # BLD AUTO: 8.55 10*3/MM3 (ref 3.4–10.8)

## 2020-02-04 NOTE — TELEPHONE ENCOUNTER
Called pt to discuss visit with PA. No answer. Unable to leave message. Placed order for cardiology consult regarding her report to PA of significant tachycardia.

## 2020-02-04 NOTE — TELEPHONE ENCOUNTER
Unable to reach patient, could not leave message.  Phone rings, then starts to ring with a busy signal.

## 2020-02-04 NOTE — TELEPHONE ENCOUNTER
Attempted to notify pt of results but no voicemail.    Let her know her blood count is wnl and her thyroid is wnl. She should monitor her heart rate and if she notes continued elevations, may require further w/u with CV

## 2020-02-06 ENCOUNTER — TELEPHONE (OUTPATIENT)
Dept: OBSTETRICS AND GYNECOLOGY | Age: 25
End: 2020-02-06

## 2020-02-06 NOTE — TELEPHONE ENCOUNTER
Pt phone number has changed [phone]; updated in the system.     Dr Mittal pt 15 weeks preg wants to know if an UA was ran when she was here 2/3. Pt is experiencing symptoms of an UTI - urinating a little bit at a time, urgency, and frequency. She can be reached at the number above. Please Advise

## 2020-02-06 NOTE — TELEPHONE ENCOUNTER
We ran a short stick only, meaning sugar and protein. If pt is having sx's, would recommend she c/i for a long dip (I actually ordered a long dip but it wasn't done).

## 2020-02-07 ENCOUNTER — ROUTINE PRENATAL (OUTPATIENT)
Dept: OBSTETRICS AND GYNECOLOGY | Age: 25
End: 2020-02-07

## 2020-02-07 ENCOUNTER — TELEPHONE (OUTPATIENT)
Dept: OBSTETRICS AND GYNECOLOGY | Age: 25
End: 2020-02-07

## 2020-02-07 VITALS — WEIGHT: 172 LBS | BODY MASS INDEX: 33.59 KG/M2 | SYSTOLIC BLOOD PRESSURE: 106 MMHG | DIASTOLIC BLOOD PRESSURE: 62 MMHG

## 2020-02-07 DIAGNOSIS — Z3A.16 16 WEEKS GESTATION OF PREGNANCY: ICD-10-CM

## 2020-02-07 DIAGNOSIS — O23.40 URINARY TRACT INFECTION IN MOTHER DURING PREGNANCY, ANTEPARTUM: Primary | ICD-10-CM

## 2020-02-07 DIAGNOSIS — Z36.1 NEED FOR MATERNAL SERUM ALPHA-PROTEIN (MSAFP) SCREENING: ICD-10-CM

## 2020-02-07 PROBLEM — N87.0 DYSPLASIA OF CERVIX, LOW GRADE (CIN 1): Status: ACTIVE | Noted: 2020-02-07

## 2020-02-07 PROCEDURE — 0502F SUBSEQUENT PRENATAL CARE: CPT | Performed by: OBSTETRICS & GYNECOLOGY

## 2020-02-07 RX ORDER — NITROFURANTOIN 25; 75 MG/1; MG/1
100 CAPSULE ORAL 2 TIMES DAILY
Qty: 14 CAPSULE | Refills: 0 | Status: SHIPPED | OUTPATIENT
Start: 2020-02-07 | End: 2020-02-09

## 2020-02-07 NOTE — TELEPHONE ENCOUNTER
FYI PATIENT'S PHONE NUMBER WAS UPDATED IN CHART ON 2/6.  I LEFT HER A MESSAGE TO CALL TO DISCUSS SCHEDULING FOR A REFERRAL TO CARDIOLOGY.

## 2020-02-07 NOTE — TELEPHONE ENCOUNTER
Recommend pt be scheduled for visit today. OK with NP or me at end of day. She will need to have urine culture sent prior to treatment.

## 2020-02-07 NOTE — TELEPHONE ENCOUNTER
Pt calling this morning she is  16 wks preg  still experiencing frequency and urgency when urinating , pt doesn't feel like she is emptying bladder fully , denies any back pain or any fever.  Patient  Came in on 02/3/20 this week  for urinalysis but per Arabella Clancy we did not run the correct dip on the urine .   Patient wants to know if she needs to  come in again or we can call anything to her pharmacy? Please advise?

## 2020-02-07 NOTE — PROGRESS NOTES
Pt present for UTI symptoms. She denies dysuria or back pain but has frequency and some urgency. She denies fever/chills  O: Gen: pt in no distress  Heart: regular rate and rhythm  Back: no cva tenderness  Abd: no suprapubic tenderness    UA reviewed: negative protein, glucose, nitrites and leukocytes    A/p: UTI symptoms: will start antibiotic with macrobid 100 mg twice daily for 7 days since prior UTI noted in preg and sent urine culture. Reviewed pyelo warnings    afp ordered, pt reports she will return for blood test only    Tachycardia: pt notes this occurred briefly at work and mentioned to PA at recent appt, no issues since, she was contacted already to see cardiologist

## 2020-02-09 ENCOUNTER — TELEPHONE (OUTPATIENT)
Dept: OBSTETRICS AND GYNECOLOGY | Age: 25
End: 2020-02-09

## 2020-02-09 PROBLEM — B95.1 GROUP B STREPTOCOCCUS URINARY TRACT INFECTION AFFECTING PREGNANCY, ANTEPARTUM: Status: ACTIVE | Noted: 2020-02-09

## 2020-02-09 PROBLEM — O23.40 GROUP B STREPTOCOCCUS URINARY TRACT INFECTION AFFECTING PREGNANCY, ANTEPARTUM: Status: ACTIVE | Noted: 2020-02-09

## 2020-02-09 RX ORDER — AMOXICILLIN 500 MG/1
500 CAPSULE ORAL 3 TIMES DAILY
Qty: 21 CAPSULE | Refills: 0 | Status: SHIPPED | OUTPATIENT
Start: 2020-02-09 | End: 2020-02-16

## 2020-02-09 NOTE — TELEPHONE ENCOUNTER
Called pt and reviewed urine culture results. Low colony count of GBS but would treat as pt symptomatic. She has not picked up or started macrobid yet. Advised her not to take macrobid and sent in amoxicillin instead given GBS result. Pt counseled and voiced understanding.

## 2020-02-11 ENCOUNTER — TELEPHONE (OUTPATIENT)
Dept: OBSTETRICS AND GYNECOLOGY | Age: 25
End: 2020-02-11

## 2020-02-11 RX ORDER — PROMETHAZINE HYDROCHLORIDE 12.5 MG/1
12.5 TABLET ORAL EVERY 8 HOURS PRN
Qty: 10 TABLET | Refills: 0 | Status: SHIPPED | OUTPATIENT
Start: 2020-02-11 | End: 2020-02-24

## 2020-02-11 NOTE — TELEPHONE ENCOUNTER
Phenergan tablets sent for pt. Pt previously advised she should avoid use of zofran and phenergan in pregnancy if possible. Please advise her again that medication was sent but recommend she avoid use in pregnancy if possible.

## 2020-02-19 LAB
AFP ADJ MOM SERPL: 1.2
AFP INTERP SERPL-IMP: NORMAL
AFP INTERP SERPL-IMP: NORMAL
AFP SERPL-MCNC: 44 NG/ML
AGE AT DELIVERY: 26.2 YR
GA METHOD: NORMAL
GA: 17.4 WEEKS
IDDM PATIENT QL: NO
LABORATORY COMMENT REPORT: NORMAL
MULTIPLE PREGNANCY: NO
NEURAL TUBE DEFECT RISK FETUS: 6499 %
RESULT: NORMAL

## 2020-02-20 ENCOUNTER — TELEPHONE (OUTPATIENT)
Dept: OBSTETRICS AND GYNECOLOGY | Age: 25
End: 2020-02-20

## 2020-02-20 NOTE — TELEPHONE ENCOUNTER
----- Message from Noemi Mittal MD sent at 2/19/2020  3:20 PM EST -----  Please call Sandra, her AFP is normal

## 2020-02-24 ENCOUNTER — OFFICE VISIT (OUTPATIENT)
Dept: CARDIOLOGY | Facility: CLINIC | Age: 25
End: 2020-02-24

## 2020-02-24 ENCOUNTER — TELEPHONE (OUTPATIENT)
Dept: OBSTETRICS AND GYNECOLOGY | Age: 25
End: 2020-02-24

## 2020-02-24 VITALS
DIASTOLIC BLOOD PRESSURE: 78 MMHG | BODY MASS INDEX: 33.92 KG/M2 | HEIGHT: 60 IN | OXYGEN SATURATION: 99 % | HEART RATE: 114 BPM | SYSTOLIC BLOOD PRESSURE: 124 MMHG | RESPIRATION RATE: 16 BRPM | WEIGHT: 172.8 LBS

## 2020-02-24 DIAGNOSIS — R00.2 PALPITATIONS: Primary | ICD-10-CM

## 2020-02-24 PROCEDURE — 93000 ELECTROCARDIOGRAM COMPLETE: CPT | Performed by: INTERNAL MEDICINE

## 2020-02-24 PROCEDURE — 99203 OFFICE O/P NEW LOW 30 MIN: CPT | Performed by: INTERNAL MEDICINE

## 2020-02-24 NOTE — PROGRESS NOTES
[facility]      Patient Name: Sandra Valadez  :1995  Age: 24 y.o.  Encounter Provider:  Raphael Puckett Jr, MD      Chief Complaint:   Chief Complaint   Patient presents with   • Palpitations     18 WEEKS PREGNANT   • Dizziness     Answers for HPI/ROS submitted by the patient on 2020   What is the primary reason for your visit?: Other  Please describe your symptoms.: Fast heart rate, sometimes up to 130+  while walking, makes me feel dizzy  Have you had these symptoms before?: No  How long have you been having these symptoms?: 1-4 weeks ago  Please list any medications you are currently taking for this condition.: Prenatal  Please describe any probable cause for these symptoms. : Walking at work or walking in grocery store sometimes makes this worse. 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 I try to drink more fluids to help when my hear rate goes uo but it doesn't seem to help    HPI  Sandra Valadez is a 24 y.o. female no significant past medical history who presents for evaluation of palpitations and dizziness.  Patient is currently 18 weeks pregnant on her fourth pregnancy and has noted that she is experiencing increased frequency of dizziness and palpitations when up and walking around at work.  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  She does not notice the symptoms much when she is at home.  She denies any chest pain, rest dyspnea or syncope.  She does have some mild shortness of air when she lies directly on her back but if she lies on her side there is no orthopnea.  She denies PND or edema.  She had 3 previous C-sections and those pregnancies were uncomplicated.  She is got no past history of eclampsia, preeclampsia or postpartum cardiac pathology.  She is a lifelong non-smoker who denies alcohol or illicit drug use.  No family history of premature coronary artery disease or sudden cardiac death.      The following portions of the  "patient's history were reviewed and updated as appropriate: allergies, current medications, past family history, past medical history, past social history, past surgical history and problem list.      Review of Systems   Constitution: Negative for chills and fever.   HENT: Negative for hoarse voice and sore throat.    Eyes: Negative for double vision and photophobia.   Cardiovascular: Positive for palpitations. Negative for chest pain, leg swelling, near-syncope, orthopnea, paroxysmal nocturnal dyspnea and syncope.   Respiratory: Negative for cough and wheezing.    Skin: Negative for poor wound healing and rash.   Musculoskeletal: Negative for arthritis and joint swelling.   Gastrointestinal: Negative for bloating, abdominal pain, hematemesis and hematochezia.   Neurological: Positive for dizziness. Negative for focal weakness.   Psychiatric/Behavioral: Negative for depression and suicidal ideas.       OBJECTIVE:   Vital Signs  Vitals:    02/24/20 1402   BP: 124/78   Pulse: 114   Resp: 16   SpO2: 99%     Estimated body mass index is 33.75 kg/m² as calculated from the following:    Height as of this encounter: 152.4 cm (60\").    Weight as of this encounter: 78.4 kg (172 lb 12.8 oz).    Physical Exam   Constitutional: She is oriented to person, place, and time. She appears well-developed and well-nourished.   HENT:   Head: Normocephalic and atraumatic.   Eyes: Pupils are equal, round, and reactive to light. Conjunctivae are normal.   Neck: No JVD present. No thyromegaly present.   Cardiovascular: Exam reveals no gallop and no friction rub.   No murmur heard.  Pulmonary/Chest: No respiratory distress. She exhibits no tenderness.   Abdominal: Bowel sounds are normal. She exhibits distension.   Appropriate abdominal distention for level of gestation   Musculoskeletal: She exhibits no edema or tenderness.   Neurological: She is alert and oriented to person, place, and time.   Skin: No rash noted. No erythema. "   Psychiatric: She has a normal mood and affect. Judgment normal.   Vitals reviewed.        ECG 12 Lead  Date/Time: 2/24/2020 2:36 PM  Performed by: Raphael Puckett Jr., MD  Authorized by: Raphael Puckett Jr., MD   Previous ECG: no previous ECG available  Rhythm: sinus rhythm    Clinical impression: normal ECG                  ASSESSMENT:      Diagnosis Plan   1. Palpitations  Holter Monitor - 24 Hour         PLAN OF CARE:     1. Palpitations and dizziness -elevated baseline heart rate after minimal activity but normal EKG.  We will check a Holter monitor.  Clinical cardiac exam is benign.  She had an echocardiogram performed in 2016 because of atypical chest discomfort and this showed a normal left ventricular ejection fraction and no significant valvular heart disease.  No indication to repeat exam at this time.  I have asked her to perform spot check for blood pressure when she has symptoms.  I have asked her to stay well-hydrated above and beyond what she is currently doing as her symptoms may be secondary to venous pooling caused by increased vascular compliance due to hormonal changes of pregnancy.    Return to clinic 3 months         Discharge Medications           Accurate as of February 24, 2020  2:33 PM. If you have any questions, ask your nurse or doctor.               Continue These Medications      Instructions Start Date   prenatal (CLASSIC) vitamin 28-0.8 MG tablet tablet   Oral, Daily         Stop These Medications    Doxylamine-Pyridoxine ER 20-20 MG tablet controlled-release  Commonly known as:  BONJESTA  Stopped by:  Raphael Puckett Jr, MD     ondansetron 4 MG tablet  Commonly known as:  ZOFRAN  Stopped by:  Raphael Puckett Jr, MD     phentermine 30 MG capsule  Stopped by:  Raphael Puckett Jr, MD     Progesterone 25 MG suppository  Stopped by:  Raphael Puckett Jr, MD     promethazine 12.5 MG tablet  Commonly known as:  PHENERGAN  Stopped by:  Raphael Puckett Jr, MD     valACYclovir 500 MG tablet  Commonly  known as:  VALTREX  Stopped by:  Raphael Puckett Jr, MD            Thank you for allowing me to participate in the care of your patient,      Sincerely,   Raphael Puckett Jr, MD  [facility]  02/24/20  2:33 PM
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
## 2020-02-24 NOTE — TELEPHONE ENCOUNTER
Pt calling requesting to have Dr. Mittal call her concerning dizziness and also has a few additional questions.  Pt is 18 weeks pregnant.      [phone]

## 2020-03-02 ENCOUNTER — ROUTINE PRENATAL (OUTPATIENT)
Dept: OBSTETRICS AND GYNECOLOGY | Age: 25
End: 2020-03-02

## 2020-03-02 ENCOUNTER — PROCEDURE VISIT (OUTPATIENT)
Dept: OBSTETRICS AND GYNECOLOGY | Age: 25
End: 2020-03-02

## 2020-03-02 VITALS — SYSTOLIC BLOOD PRESSURE: 122 MMHG | WEIGHT: 174.6 LBS | BODY MASS INDEX: 34.1 KG/M2 | DIASTOLIC BLOOD PRESSURE: 70 MMHG

## 2020-03-02 DIAGNOSIS — Z36.89 ENCOUNTER FOR ULTRASOUND TO ASSESS FETAL ANATOMY AND GROWTH IN TWIN PREGNANCY, ANTEPARTUM: Primary | ICD-10-CM

## 2020-03-02 DIAGNOSIS — O30.009 ENCOUNTER FOR ULTRASOUND TO ASSESS FETAL ANATOMY AND GROWTH IN TWIN PREGNANCY, ANTEPARTUM: Primary | ICD-10-CM

## 2020-03-02 DIAGNOSIS — Z3A.19 19 WEEKS GESTATION OF PREGNANCY: Primary | ICD-10-CM

## 2020-03-02 DIAGNOSIS — Z13.1 SCREENING FOR DIABETES MELLITUS: ICD-10-CM

## 2020-03-02 DIAGNOSIS — Z36.86 ENCOUNTER FOR SCREENING FOR RISK OF PRE-TERM LABOR: ICD-10-CM

## 2020-03-02 PROCEDURE — 0502F SUBSEQUENT PRENATAL CARE: CPT | Performed by: OBSTETRICS & GYNECOLOGY

## 2020-03-02 PROCEDURE — 81002 URINALYSIS NONAUTO W/O SCOPE: CPT | Performed by: OBSTETRICS & GYNECOLOGY

## 2020-03-02 NOTE — PROGRESS NOTES
Pt presents for routine u/s and visit. She saw cardiologist and he did not find reason for palpitations. She did holter and results are pending. She reports some ongoing symptoms intermittently. She noted some today. She notes she did not eat full breakfast but had some chocolate prior to symptoms. She notes fetal movement and denies ctx/bleeding/leaking fluid.  O: anatomic survey done and normal; normal cervical length noted 4.7 cm  Gen: pt in no distress  Heart: tachycardic with regular rhythm; rate in 120's with assessment in office    A/p:Palpitations: pt saw cardiologist and holter pending. He did not expect any abnormalities. Advised her to avoid any caffeine or stimulants. Will screen for early diabetes as glucose intolerance could contribute to symptoms. Orders placed. Advised her to go to hospital for recurrent palpitations or chest pain/soa. Patient notes symptoms also seem worse at work. Discussed option of restricted duty or work hours. Pt declines currently.     Previous  delivery, pt plans repeat with tubal.    Reviewed GBS positive with recent ucx. Patient treated.

## 2020-03-06 ENCOUNTER — TELEPHONE (OUTPATIENT)
Dept: CARDIOLOGY | Facility: CLINIC | Age: 25
End: 2020-03-06

## 2020-03-06 NOTE — TELEPHONE ENCOUNTER
Spoke to patient on phone about Holter monitor results.  Patient had a significant portion of telemetry in sinus tachycardia with rates ranging from 100 to 164 bpm.  She remains symptomatic with transient dizziness but no syncope.  Currently 20 weeks pregnant.  I will contact her OB and discussed the possibility of starting low-dose labetalol at this point in time.  We will consider initiating therapy after that discussion.

## 2020-03-07 ENCOUNTER — HOSPITAL ENCOUNTER (INPATIENT)
Facility: HOSPITAL | Age: 25
LOS: 3 days | Discharge: HOME | End: 2020-03-10
Attending: EMERGENCY MEDICINE | Admitting: OBSTETRICS & GYNECOLOGY

## 2020-03-07 ENCOUNTER — DOCUMENTATION (OUTPATIENT)
Dept: LABOR AND DELIVERY | Facility: HOSPITAL | Age: 25
End: 2020-03-07

## 2020-03-07 ENCOUNTER — APPOINTMENT (OUTPATIENT)
Dept: CT IMAGING | Facility: HOSPITAL | Age: 25
End: 2020-03-07

## 2020-03-07 DIAGNOSIS — R00.0 TACHYCARDIA: ICD-10-CM

## 2020-03-07 DIAGNOSIS — I26.99 ACUTE PULMONARY EMBOLISM WITHOUT ACUTE COR PULMONALE, UNSPECIFIED PULMONARY EMBOLISM TYPE (HCC): Primary | ICD-10-CM

## 2020-03-07 DIAGNOSIS — Z3A.20 20 WEEKS GESTATION OF PREGNANCY: ICD-10-CM

## 2020-03-07 PROBLEM — O88.219: Status: ACTIVE | Noted: 2020-03-07

## 2020-03-07 LAB
ABO GROUP BLD: NORMAL
ALBUMIN SERPL-MCNC: 3.6 G/DL (ref 3.5–5.2)
ALBUMIN/GLOB SERPL: 1.2 G/DL
ALP SERPL-CCNC: 53 U/L (ref 39–117)
ALT SERPL W P-5'-P-CCNC: 8 U/L (ref 1–33)
ANION GAP SERPL CALCULATED.3IONS-SCNC: 11.3 MMOL/L (ref 5–15)
AST SERPL-CCNC: 13 U/L (ref 1–32)
BASOPHILS # BLD AUTO: 0.03 10*3/MM3 (ref 0–0.2)
BASOPHILS NFR BLD AUTO: 0.3 % (ref 0–1.5)
BILIRUB SERPL-MCNC: <0.2 MG/DL (ref 0.2–1.2)
BILIRUB UR QL STRIP: NEGATIVE
BLD GP AB SCN SERPL QL: NEGATIVE
BUN BLD-MCNC: 11 MG/DL (ref 6–20)
BUN/CREAT SERPL: 21.2 (ref 7–25)
CALCIUM SPEC-SCNC: 9.1 MG/DL (ref 8.6–10.5)
CHLORIDE SERPL-SCNC: 102 MMOL/L (ref 98–107)
CLARITY UR: ABNORMAL
CO2 SERPL-SCNC: 22.7 MMOL/L (ref 22–29)
COLOR UR: YELLOW
CREAT BLD-MCNC: 0.52 MG/DL (ref 0.57–1)
D DIMER PPP FEU-MCNC: 0.75 MCGFEU/ML (ref 0–0.49)
DEPRECATED RDW RBC AUTO: 41.1 FL (ref 37–54)
EOSINOPHIL # BLD AUTO: 0.07 10*3/MM3 (ref 0–0.4)
EOSINOPHIL NFR BLD AUTO: 0.8 % (ref 0.3–6.2)
ERYTHROCYTE [DISTWIDTH] IN BLOOD BY AUTOMATED COUNT: 12.8 % (ref 12.3–15.4)
GFR SERPL CREATININE-BSD FRML MDRD: 144 ML/MIN/1.73
GLOBULIN UR ELPH-MCNC: 2.9 GM/DL
GLUCOSE BLD-MCNC: 85 MG/DL (ref 65–99)
GLUCOSE UR STRIP-MCNC: NEGATIVE MG/DL
HCT VFR BLD AUTO: 34.7 % (ref 34–46.6)
HGB BLD-MCNC: 11.7 G/DL (ref 12–15.9)
HGB UR QL STRIP.AUTO: NEGATIVE
IMM GRANULOCYTES # BLD AUTO: 0.05 10*3/MM3 (ref 0–0.05)
IMM GRANULOCYTES NFR BLD AUTO: 0.6 % (ref 0–0.5)
KETONES UR QL STRIP: NEGATIVE
LEUKOCYTE ESTERASE UR QL STRIP.AUTO: NEGATIVE
LYMPHOCYTES # BLD AUTO: 1.99 10*3/MM3 (ref 0.7–3.1)
LYMPHOCYTES NFR BLD AUTO: 23.1 % (ref 19.6–45.3)
MAGNESIUM SERPL-MCNC: 1.8 MG/DL (ref 1.6–2.6)
MCH RBC QN AUTO: 29.7 PG (ref 26.6–33)
MCHC RBC AUTO-ENTMCNC: 33.7 G/DL (ref 31.5–35.7)
MCV RBC AUTO: 88.1 FL (ref 79–97)
MONOCYTES # BLD AUTO: 0.68 10*3/MM3 (ref 0.1–0.9)
MONOCYTES NFR BLD AUTO: 7.9 % (ref 5–12)
NEUTROPHILS # BLD AUTO: 5.78 10*3/MM3 (ref 1.7–7)
NEUTROPHILS NFR BLD AUTO: 67.3 % (ref 42.7–76)
NITRITE UR QL STRIP: NEGATIVE
NRBC BLD AUTO-RTO: 0 /100 WBC (ref 0–0.2)
PH UR STRIP.AUTO: 8.5 [PH] (ref 5–8)
PLATELET # BLD AUTO: 275 10*3/MM3 (ref 140–450)
PMV BLD AUTO: 9.7 FL (ref 6–12)
POTASSIUM BLD-SCNC: 4.4 MMOL/L (ref 3.5–5.2)
PROT SERPL-MCNC: 6.5 G/DL (ref 6–8.5)
PROT UR QL STRIP: NEGATIVE
RBC # BLD AUTO: 3.94 10*6/MM3 (ref 3.77–5.28)
RH BLD: POSITIVE
SODIUM BLD-SCNC: 136 MMOL/L (ref 136–145)
SP GR UR STRIP: 1.02 (ref 1–1.03)
T&S EXPIRATION DATE: NORMAL
UROBILINOGEN UR QL STRIP: ABNORMAL
WBC NRBC COR # BLD: 8.6 10*3/MM3 (ref 3.4–10.8)

## 2020-03-07 PROCEDURE — 93005 ELECTROCARDIOGRAM TRACING: CPT

## 2020-03-07 PROCEDURE — 83735 ASSAY OF MAGNESIUM: CPT | Performed by: EMERGENCY MEDICINE

## 2020-03-07 PROCEDURE — 93010 ELECTROCARDIOGRAM REPORT: CPT | Performed by: INTERNAL MEDICINE

## 2020-03-07 PROCEDURE — 99284 EMERGENCY DEPT VISIT MOD MDM: CPT

## 2020-03-07 PROCEDURE — 86850 RBC ANTIBODY SCREEN: CPT | Performed by: OBSTETRICS & GYNECOLOGY

## 2020-03-07 PROCEDURE — 86901 BLOOD TYPING SEROLOGIC RH(D): CPT | Performed by: OBSTETRICS & GYNECOLOGY

## 2020-03-07 PROCEDURE — 0 IOPAMIDOL PER 1 ML: Performed by: EMERGENCY MEDICINE

## 2020-03-07 PROCEDURE — 71275 CT ANGIOGRAPHY CHEST: CPT

## 2020-03-07 PROCEDURE — 86900 BLOOD TYPING SEROLOGIC ABO: CPT | Performed by: OBSTETRICS & GYNECOLOGY

## 2020-03-07 PROCEDURE — 80053 COMPREHEN METABOLIC PANEL: CPT | Performed by: EMERGENCY MEDICINE

## 2020-03-07 PROCEDURE — 25010000002 ENOXAPARIN PER 10 MG: Performed by: OBSTETRICS & GYNECOLOGY

## 2020-03-07 PROCEDURE — 93005 ELECTROCARDIOGRAM TRACING: CPT | Performed by: EMERGENCY MEDICINE

## 2020-03-07 PROCEDURE — 25010000002 ENOXAPARIN PER 10 MG: Performed by: EMERGENCY MEDICINE

## 2020-03-07 PROCEDURE — 99221 1ST HOSP IP/OBS SF/LOW 40: CPT | Performed by: OBSTETRICS & GYNECOLOGY

## 2020-03-07 PROCEDURE — 81003 URINALYSIS AUTO W/O SCOPE: CPT | Performed by: EMERGENCY MEDICINE

## 2020-03-07 PROCEDURE — 85379 FIBRIN DEGRADATION QUANT: CPT | Performed by: EMERGENCY MEDICINE

## 2020-03-07 PROCEDURE — 85025 COMPLETE CBC W/AUTO DIFF WBC: CPT | Performed by: EMERGENCY MEDICINE

## 2020-03-07 RX ORDER — LIDOCAINE HYDROCHLORIDE 10 MG/ML
5 INJECTION, SOLUTION EPIDURAL; INFILTRATION; INTRACAUDAL; PERINEURAL AS NEEDED
Status: DISCONTINUED | OUTPATIENT
Start: 2020-03-07 | End: 2020-03-10 | Stop reason: HOSPADM

## 2020-03-07 RX ORDER — ACETAMINOPHEN 325 MG/1
650 TABLET ORAL EVERY 4 HOURS PRN
Status: DISCONTINUED | OUTPATIENT
Start: 2020-03-07 | End: 2020-03-10 | Stop reason: HOSPADM

## 2020-03-07 RX ORDER — PRENATAL VIT NO.126/IRON/FOLIC 28MG-0.8MG
1 TABLET ORAL DAILY
Status: DISCONTINUED | OUTPATIENT
Start: 2020-03-07 | End: 2020-03-10 | Stop reason: HOSPADM

## 2020-03-07 RX ORDER — SODIUM CHLORIDE 0.9 % (FLUSH) 0.9 %
3 SYRINGE (ML) INJECTION EVERY 12 HOURS SCHEDULED
Status: DISCONTINUED | OUTPATIENT
Start: 2020-03-07 | End: 2020-03-10 | Stop reason: HOSPADM

## 2020-03-07 RX ORDER — SODIUM CHLORIDE 0.9 % (FLUSH) 0.9 %
10 SYRINGE (ML) INJECTION AS NEEDED
Status: DISCONTINUED | OUTPATIENT
Start: 2020-03-07 | End: 2020-03-10 | Stop reason: HOSPADM

## 2020-03-07 RX ADMIN — ENOXAPARIN SODIUM 80 MG: 80 INJECTION SUBCUTANEOUS at 22:06

## 2020-03-07 RX ADMIN — ENOXAPARIN SODIUM 80 MG: 80 INJECTION SUBCUTANEOUS at 11:06

## 2020-03-07 RX ADMIN — SODIUM CHLORIDE, PRESERVATIVE FREE 3 ML: 5 INJECTION INTRAVENOUS at 21:55

## 2020-03-07 RX ADMIN — IOPAMIDOL 95 ML: 755 INJECTION, SOLUTION INTRAVENOUS at 10:24

## 2020-03-07 RX ADMIN — SODIUM CHLORIDE 1000 ML: 9 INJECTION, SOLUTION INTRAVENOUS at 08:25

## 2020-03-08 ENCOUNTER — APPOINTMENT (OUTPATIENT)
Dept: CARDIOLOGY | Facility: HOSPITAL | Age: 25
End: 2020-03-08

## 2020-03-08 LAB
BASOPHILS # BLD AUTO: 0.02 10*3/MM3 (ref 0–0.2)
BASOPHILS NFR BLD AUTO: 0.2 % (ref 0–1.5)
BH CV LOWER VASCULAR LEFT COMMON FEMORAL AUGMENT: NORMAL
BH CV LOWER VASCULAR LEFT COMMON FEMORAL COMPETENT: NORMAL
BH CV LOWER VASCULAR LEFT COMMON FEMORAL COMPRESS: NORMAL
BH CV LOWER VASCULAR LEFT COMMON FEMORAL PHASIC: NORMAL
BH CV LOWER VASCULAR LEFT COMMON FEMORAL SPONT: NORMAL
BH CV LOWER VASCULAR LEFT DISTAL FEMORAL COMPRESS: NORMAL
BH CV LOWER VASCULAR LEFT GASTRONEMIUS COMPRESS: NORMAL
BH CV LOWER VASCULAR LEFT GREATER SAPH AK COMPRESS: NORMAL
BH CV LOWER VASCULAR LEFT GREATER SAPH BK COMPRESS: NORMAL
BH CV LOWER VASCULAR LEFT MID FEMORAL AUGMENT: NORMAL
BH CV LOWER VASCULAR LEFT MID FEMORAL COMPETENT: NORMAL
BH CV LOWER VASCULAR LEFT MID FEMORAL COMPRESS: NORMAL
BH CV LOWER VASCULAR LEFT MID FEMORAL PHASIC: NORMAL
BH CV LOWER VASCULAR LEFT MID FEMORAL SPONT: NORMAL
BH CV LOWER VASCULAR LEFT PERONEAL COMPRESS: NORMAL
BH CV LOWER VASCULAR LEFT POPLITEAL AUGMENT: NORMAL
BH CV LOWER VASCULAR LEFT POPLITEAL COMPETENT: NORMAL
BH CV LOWER VASCULAR LEFT POPLITEAL COMPRESS: NORMAL
BH CV LOWER VASCULAR LEFT POPLITEAL PHASIC: NORMAL
BH CV LOWER VASCULAR LEFT POPLITEAL SPONT: NORMAL
BH CV LOWER VASCULAR LEFT POSTERIOR TIBIAL COMPRESS: NORMAL
BH CV LOWER VASCULAR LEFT PROFUNDA FEMORAL COMPRESS: NORMAL
BH CV LOWER VASCULAR LEFT PROXIMAL FEMORAL COMPRESS: NORMAL
BH CV LOWER VASCULAR LEFT SAPHENOFEMORAL JUNCTION COMPRESS: NORMAL
BH CV LOWER VASCULAR RIGHT COMMON FEMORAL AUGMENT: NORMAL
BH CV LOWER VASCULAR RIGHT COMMON FEMORAL COMPETENT: NORMAL
BH CV LOWER VASCULAR RIGHT COMMON FEMORAL COMPRESS: NORMAL
BH CV LOWER VASCULAR RIGHT COMMON FEMORAL PHASIC: NORMAL
BH CV LOWER VASCULAR RIGHT COMMON FEMORAL SPONT: NORMAL
BH CV LOWER VASCULAR RIGHT DISTAL FEMORAL COMPRESS: NORMAL
BH CV LOWER VASCULAR RIGHT GASTRONEMIUS COMPRESS: NORMAL
BH CV LOWER VASCULAR RIGHT GREATER SAPH AK COMPRESS: NORMAL
BH CV LOWER VASCULAR RIGHT GREATER SAPH BK COMPRESS: NORMAL
BH CV LOWER VASCULAR RIGHT MID FEMORAL AUGMENT: NORMAL
BH CV LOWER VASCULAR RIGHT MID FEMORAL COMPETENT: NORMAL
BH CV LOWER VASCULAR RIGHT MID FEMORAL COMPRESS: NORMAL
BH CV LOWER VASCULAR RIGHT MID FEMORAL PHASIC: NORMAL
BH CV LOWER VASCULAR RIGHT MID FEMORAL SPONT: NORMAL
BH CV LOWER VASCULAR RIGHT PERONEAL COMPRESS: NORMAL
BH CV LOWER VASCULAR RIGHT POPLITEAL AUGMENT: NORMAL
BH CV LOWER VASCULAR RIGHT POPLITEAL COMPETENT: NORMAL
BH CV LOWER VASCULAR RIGHT POPLITEAL COMPRESS: NORMAL
BH CV LOWER VASCULAR RIGHT POPLITEAL PHASIC: NORMAL
BH CV LOWER VASCULAR RIGHT POPLITEAL SPONT: NORMAL
BH CV LOWER VASCULAR RIGHT POSTERIOR TIBIAL COMPRESS: NORMAL
BH CV LOWER VASCULAR RIGHT PROFUNDA FEMORAL COMPRESS: NORMAL
BH CV LOWER VASCULAR RIGHT PROXIMAL FEMORAL COMPRESS: NORMAL
BH CV LOWER VASCULAR RIGHT SAPHENOFEMORAL JUNCTION COMPRESS: NORMAL
DEPRECATED RDW RBC AUTO: 43.1 FL (ref 37–54)
EOSINOPHIL # BLD AUTO: 0.12 10*3/MM3 (ref 0–0.4)
EOSINOPHIL NFR BLD AUTO: 1.4 % (ref 0.3–6.2)
ERYTHROCYTE [DISTWIDTH] IN BLOOD BY AUTOMATED COUNT: 13.2 % (ref 12.3–15.4)
HCT VFR BLD AUTO: 31.3 % (ref 34–46.6)
HGB BLD-MCNC: 10.7 G/DL (ref 12–15.9)
IMM GRANULOCYTES # BLD AUTO: 0.03 10*3/MM3 (ref 0–0.05)
IMM GRANULOCYTES NFR BLD AUTO: 0.3 % (ref 0–0.5)
LYMPHOCYTES # BLD AUTO: 2.76 10*3/MM3 (ref 0.7–3.1)
LYMPHOCYTES NFR BLD AUTO: 32.1 % (ref 19.6–45.3)
MCH RBC QN AUTO: 30.3 PG (ref 26.6–33)
MCHC RBC AUTO-ENTMCNC: 34.2 G/DL (ref 31.5–35.7)
MCV RBC AUTO: 88.7 FL (ref 79–97)
MONOCYTES # BLD AUTO: 0.74 10*3/MM3 (ref 0.1–0.9)
MONOCYTES NFR BLD AUTO: 8.6 % (ref 5–12)
NEUTROPHILS # BLD AUTO: 4.94 10*3/MM3 (ref 1.7–7)
NEUTROPHILS NFR BLD AUTO: 57.4 % (ref 42.7–76)
NRBC BLD AUTO-RTO: 0.1 /100 WBC (ref 0–0.2)
PLATELET # BLD AUTO: 244 10*3/MM3 (ref 140–450)
PMV BLD AUTO: 9.6 FL (ref 6–12)
RBC # BLD AUTO: 3.53 10*6/MM3 (ref 3.77–5.28)
WBC NRBC COR # BLD: 8.61 10*3/MM3 (ref 3.4–10.8)

## 2020-03-08 PROCEDURE — 85025 COMPLETE CBC W/AUTO DIFF WBC: CPT | Performed by: OBSTETRICS & GYNECOLOGY

## 2020-03-08 PROCEDURE — 94618 PULMONARY STRESS TESTING: CPT

## 2020-03-08 PROCEDURE — 25010000002 ENOXAPARIN PER 10 MG: Performed by: OBSTETRICS & GYNECOLOGY

## 2020-03-08 PROCEDURE — 93970 EXTREMITY STUDY: CPT

## 2020-03-08 PROCEDURE — 99232 SBSQ HOSP IP/OBS MODERATE 35: CPT | Performed by: OBSTETRICS & GYNECOLOGY

## 2020-03-08 PROCEDURE — 94762 N-INVAS EAR/PLS OXIMTRY CONT: CPT

## 2020-03-08 RX ADMIN — SODIUM CHLORIDE, PRESERVATIVE FREE 3 ML: 5 INJECTION INTRAVENOUS at 21:42

## 2020-03-08 RX ADMIN — ENOXAPARIN SODIUM 80 MG: 80 INJECTION SUBCUTANEOUS at 22:12

## 2020-03-08 RX ADMIN — SODIUM CHLORIDE, PRESERVATIVE FREE 3 ML: 5 INJECTION INTRAVENOUS at 10:40

## 2020-03-08 RX ADMIN — ENOXAPARIN SODIUM 80 MG: 80 INJECTION SUBCUTANEOUS at 12:20

## 2020-03-08 RX ADMIN — Medication 1 TABLET: at 10:37

## 2020-03-09 ENCOUNTER — TELEPHONE (OUTPATIENT)
Dept: OBSTETRICS AND GYNECOLOGY | Age: 25
End: 2020-03-09

## 2020-03-09 PROBLEM — D50.9 IRON DEFICIENCY ANEMIA DURING PREGNANCY: Status: ACTIVE | Noted: 2020-03-09

## 2020-03-09 PROBLEM — O99.019 IRON DEFICIENCY ANEMIA DURING PREGNANCY: Status: ACTIVE | Noted: 2020-03-09

## 2020-03-09 PROCEDURE — 99254 IP/OBS CNSLTJ NEW/EST MOD 60: CPT | Performed by: INTERNAL MEDICINE

## 2020-03-09 PROCEDURE — 99232 SBSQ HOSP IP/OBS MODERATE 35: CPT | Performed by: OBSTETRICS & GYNECOLOGY

## 2020-03-09 PROCEDURE — 25010000002 ENOXAPARIN PER 10 MG: Performed by: OBSTETRICS & GYNECOLOGY

## 2020-03-09 RX ORDER — DOCUSATE SODIUM 100 MG/1
100 CAPSULE, LIQUID FILLED ORAL DAILY PRN
Status: DISCONTINUED | OUTPATIENT
Start: 2020-03-09 | End: 2020-03-10 | Stop reason: HOSPADM

## 2020-03-09 RX ORDER — FERROUS SULFATE 325(65) MG
325 TABLET ORAL
Status: DISCONTINUED | OUTPATIENT
Start: 2020-03-09 | End: 2020-03-10 | Stop reason: HOSPADM

## 2020-03-09 RX ADMIN — SODIUM CHLORIDE, PRESERVATIVE FREE 3 ML: 5 INJECTION INTRAVENOUS at 09:13

## 2020-03-09 RX ADMIN — SODIUM CHLORIDE, PRESERVATIVE FREE 3 ML: 5 INJECTION INTRAVENOUS at 20:17

## 2020-03-09 RX ADMIN — ENOXAPARIN SODIUM 80 MG: 80 INJECTION SUBCUTANEOUS at 22:43

## 2020-03-09 RX ADMIN — FERROUS SULFATE TAB 325 MG (65 MG ELEMENTAL FE) 325 MG: 325 (65 FE) TAB at 12:50

## 2020-03-09 RX ADMIN — Medication 1 TABLET: at 09:13

## 2020-03-09 RX ADMIN — ENOXAPARIN SODIUM 80 MG: 80 INJECTION SUBCUTANEOUS at 12:50

## 2020-03-09 NOTE — TELEPHONE ENCOUNTER
Called pt and reviewed her questions. She is currently inpatient at [facility]. She may go home tomorrow. Has an Echo planned tomorrow.

## 2020-03-09 NOTE — TELEPHONE ENCOUNTER
Pt called, admitted to ER on 3/7.  Pt still in hospital, wants to speak to Dr. Mittal concerning her papers for missing work.    [phone]

## 2020-03-10 ENCOUNTER — APPOINTMENT (OUTPATIENT)
Dept: CARDIOLOGY | Facility: HOSPITAL | Age: 25
End: 2020-03-10

## 2020-03-10 VITALS
HEIGHT: 60 IN | HEART RATE: 89 BPM | SYSTOLIC BLOOD PRESSURE: 114 MMHG | TEMPERATURE: 98.1 F | WEIGHT: 167 LBS | BODY MASS INDEX: 32.79 KG/M2 | OXYGEN SATURATION: 97 % | RESPIRATION RATE: 16 BRPM | DIASTOLIC BLOOD PRESSURE: 70 MMHG

## 2020-03-10 DIAGNOSIS — O88.219 MATERNAL PULMONARY EMBOLUS (PE), CURRENT PREGNANCY: Primary | ICD-10-CM

## 2020-03-10 LAB
AORTIC DIMENSIONLESS INDEX: 0.8 (DI)
ASCENDING AORTA: 2.8 CM
BH CV ECHO MEAS - ACS: 2.1 CM
BH CV ECHO MEAS - AO MAX PG (FULL): 2.3 MMHG
BH CV ECHO MEAS - AO MAX PG: 6.7 MMHG
BH CV ECHO MEAS - AO MEAN PG (FULL): 2 MMHG
BH CV ECHO MEAS - AO MEAN PG: 4 MMHG
BH CV ECHO MEAS - AO ROOT AREA (BSA CORRECTED): 1.7
BH CV ECHO MEAS - AO ROOT AREA: 6.6 CM^2
BH CV ECHO MEAS - AO ROOT DIAM: 2.9 CM
BH CV ECHO MEAS - AO V2 MAX: 129 CM/SEC
BH CV ECHO MEAS - AO V2 MEAN: 96.2 CM/SEC
BH CV ECHO MEAS - AO V2 VTI: 26 CM
BH CV ECHO MEAS - AVA(I,A): 2.4 CM^2
BH CV ECHO MEAS - AVA(I,D): 2.4 CM^2
BH CV ECHO MEAS - AVA(V,A): 2.5 CM^2
BH CV ECHO MEAS - AVA(V,D): 2.5 CM^2
BH CV ECHO MEAS - BSA(HAYCOCK): 1.8 M^2
BH CV ECHO MEAS - BSA: 1.7 M^2
BH CV ECHO MEAS - BZI_BMI: 32.6 KILOGRAMS/M^2
BH CV ECHO MEAS - BZI_METRIC_HEIGHT: 152.4 CM
BH CV ECHO MEAS - BZI_METRIC_WEIGHT: 75.8 KG
BH CV ECHO MEAS - EDV(MOD-SP2): 86 ML
BH CV ECHO MEAS - EDV(MOD-SP4): 80 ML
BH CV ECHO MEAS - EDV(TEICH): 117.1 ML
BH CV ECHO MEAS - EF(CUBED): 55.9 %
BH CV ECHO MEAS - EF(MOD-BP): 61 %
BH CV ECHO MEAS - EF(MOD-SP2): 66.3 %
BH CV ECHO MEAS - EF(MOD-SP4): 51.3 %
BH CV ECHO MEAS - EF(TEICH): 47.4 %
BH CV ECHO MEAS - ESV(MOD-SP2): 29 ML
BH CV ECHO MEAS - ESV(MOD-SP4): 39 ML
BH CV ECHO MEAS - ESV(TEICH): 61.6 ML
BH CV ECHO MEAS - FS: 23.9 %
BH CV ECHO MEAS - IVS/LVPW: 0.92
BH CV ECHO MEAS - IVSD: 0.79 CM
BH CV ECHO MEAS - LAT PEAK E' VEL: 18 CM/SEC
BH CV ECHO MEAS - LV DIASTOLIC VOL/BSA (35-75): 46.3 ML/M^2
BH CV ECHO MEAS - LV MASS(C)D: 139.9 GRAMS
BH CV ECHO MEAS - LV MASS(C)DI: 80.9 GRAMS/M^2
BH CV ECHO MEAS - LV MAX PG: 4.3 MMHG
BH CV ECHO MEAS - LV MEAN PG: 2 MMHG
BH CV ECHO MEAS - LV SYSTOLIC VOL/BSA (12-30): 22.6 ML/M^2
BH CV ECHO MEAS - LV V1 MAX: 104 CM/SEC
BH CV ECHO MEAS - LV V1 MEAN: 73 CM/SEC
BH CV ECHO MEAS - LV V1 VTI: 19.5 CM
BH CV ECHO MEAS - LVIDD: 5 CM
BH CV ECHO MEAS - LVIDS: 3.8 CM
BH CV ECHO MEAS - LVLD AP2: 8.1 CM
BH CV ECHO MEAS - LVLD AP4: 7.6 CM
BH CV ECHO MEAS - LVLS AP2: 6.5 CM
BH CV ECHO MEAS - LVLS AP4: 6.4 CM
BH CV ECHO MEAS - LVOT AREA (M): 3.1 CM^2
BH CV ECHO MEAS - LVOT AREA: 3.1 CM^2
BH CV ECHO MEAS - LVOT DIAM: 2 CM
BH CV ECHO MEAS - LVPWD: 0.86 CM
BH CV ECHO MEAS - MED PEAK E' VEL: 9 CM/SEC
BH CV ECHO MEAS - MR MAX PG: 15.1 MMHG
BH CV ECHO MEAS - MR MAX VEL: 194 CM/SEC
BH CV ECHO MEAS - MV A DUR: 0.12 SEC
BH CV ECHO MEAS - MV A MAX VEL: 52.1 CM/SEC
BH CV ECHO MEAS - MV DEC SLOPE: 480 CM/SEC^2
BH CV ECHO MEAS - MV DEC TIME: 0.13 SEC
BH CV ECHO MEAS - MV E MAX VEL: 63.6 CM/SEC
BH CV ECHO MEAS - MV E/A: 1.2
BH CV ECHO MEAS - MV MAX PG: 2.4 MMHG
BH CV ECHO MEAS - MV MEAN PG: 1 MMHG
BH CV ECHO MEAS - MV P1/2T MAX VEL: 63.6 CM/SEC
BH CV ECHO MEAS - MV P1/2T: 38.8 MSEC
BH CV ECHO MEAS - MV V2 MAX: 77.5 CM/SEC
BH CV ECHO MEAS - MV V2 MEAN: 52.7 CM/SEC
BH CV ECHO MEAS - MV V2 VTI: 18.6 CM
BH CV ECHO MEAS - MVA P1/2T LCG: 3.5 CM^2
BH CV ECHO MEAS - MVA(P1/2T): 5.7 CM^2
BH CV ECHO MEAS - MVA(VTI): 3.3 CM^2
BH CV ECHO MEAS - PA ACC TIME: 0.12 SEC
BH CV ECHO MEAS - PA MAX PG (FULL): 2.1 MMHG
BH CV ECHO MEAS - PA MAX PG: 5.2 MMHG
BH CV ECHO MEAS - PA PR(ACCEL): 26.8 MMHG
BH CV ECHO MEAS - PA V2 MAX: 114 CM/SEC
BH CV ECHO MEAS - PULM A REVS DUR: 0.11 SEC
BH CV ECHO MEAS - PULM A REVS VEL: 31.8 CM/SEC
BH CV ECHO MEAS - PULM DIAS VEL: 36.3 CM/SEC
BH CV ECHO MEAS - PULM S/D: 1.1
BH CV ECHO MEAS - PULM SYS VEL: 40.8 CM/SEC
BH CV ECHO MEAS - PVA(V,A): 3 CM^2
BH CV ECHO MEAS - PVA(V,D): 3 CM^2
BH CV ECHO MEAS - QP/QS: 1
BH CV ECHO MEAS - RAP SYSTOLE: 3 MMHG
BH CV ECHO MEAS - RV MAX PG: 3.1 MMHG
BH CV ECHO MEAS - RV MEAN PG: 2 MMHG
BH CV ECHO MEAS - RV V1 MAX: 88.6 CM/SEC
BH CV ECHO MEAS - RV V1 MEAN: 59.2 CM/SEC
BH CV ECHO MEAS - RV V1 VTI: 16.9 CM
BH CV ECHO MEAS - RVOT AREA: 3.8 CM^2
BH CV ECHO MEAS - RVOT DIAM: 2.2 CM
BH CV ECHO MEAS - RVSP: 28 MMHG
BH CV ECHO MEAS - SI(AO): 99.3 ML/M^2
BH CV ECHO MEAS - SI(CUBED): 39.9 ML/M^2
BH CV ECHO MEAS - SI(LVOT): 35.4 ML/M^2
BH CV ECHO MEAS - SI(MOD-SP2): 33 ML/M^2
BH CV ECHO MEAS - SI(MOD-SP4): 23.7 ML/M^2
BH CV ECHO MEAS - SI(TEICH): 32.1 ML/M^2
BH CV ECHO MEAS - SV(AO): 171.7 ML
BH CV ECHO MEAS - SV(CUBED): 69.1 ML
BH CV ECHO MEAS - SV(LVOT): 61.3 ML
BH CV ECHO MEAS - SV(MOD-SP2): 57 ML
BH CV ECHO MEAS - SV(MOD-SP4): 41 ML
BH CV ECHO MEAS - SV(RVOT): 64.2 ML
BH CV ECHO MEAS - SV(TEICH): 55.6 ML
BH CV ECHO MEAS - TR MAX VEL: 251 CM/SEC
BH CV ECHO MEASUREMENTS AVERAGE E/E' RATIO: 4.71
BH CV XLRA - RV BASE: 4 CM
BH CV XLRA - RV LENGTH: 6.4 CM
BH CV XLRA - RV MID: 3.4 CM
BH CV XLRA - TDI S': 12 CM/SEC
LEFT ATRIUM VOLUME INDEX: 25 ML/M2
LV EF 2D ECHO EST: 61 %
MAXIMAL PREDICTED HEART RATE: 195 BPM
SINUS: 2.9 CM
STJ: 2.6 CM
STRESS TARGET HR: 166 BPM

## 2020-03-10 PROCEDURE — 99238 HOSP IP/OBS DSCHRG MGMT 30/<: CPT | Performed by: OBSTETRICS & GYNECOLOGY

## 2020-03-10 PROCEDURE — 93306 TTE W/DOPPLER COMPLETE: CPT

## 2020-03-10 PROCEDURE — 99231 SBSQ HOSP IP/OBS SF/LOW 25: CPT | Performed by: NURSE PRACTITIONER

## 2020-03-10 PROCEDURE — 25010000002 ENOXAPARIN PER 10 MG: Performed by: OBSTETRICS & GYNECOLOGY

## 2020-03-10 PROCEDURE — 93306 TTE W/DOPPLER COMPLETE: CPT | Performed by: INTERNAL MEDICINE

## 2020-03-10 RX ORDER — LANOLIN ALCOHOL/MO/W.PET/CERES
325 CREAM (GRAM) TOPICAL
Qty: 30 TABLET | Refills: 3 | Status: SHIPPED | OUTPATIENT
Start: 2020-03-11 | End: 2020-05-26 | Stop reason: SINTOL

## 2020-03-10 RX ADMIN — FERROUS SULFATE TAB 325 MG (65 MG ELEMENTAL FE) 325 MG: 325 (65 FE) TAB at 09:24

## 2020-03-10 RX ADMIN — SODIUM CHLORIDE, PRESERVATIVE FREE 3 ML: 5 INJECTION INTRAVENOUS at 09:26

## 2020-03-10 RX ADMIN — Medication 1 TABLET: at 09:24

## 2020-03-10 RX ADMIN — ENOXAPARIN SODIUM 80 MG: 80 INJECTION SUBCUTANEOUS at 15:15

## 2020-03-11 ENCOUNTER — TELEPHONE (OUTPATIENT)
Dept: ONCOLOGY | Facility: CLINIC | Age: 25
End: 2020-03-11

## 2020-03-11 NOTE — TELEPHONE ENCOUNTER
PT-HERNANDEZ BATES-DR. SAMANTHA VILLARREAL    PT NEEDS TO RESCHEDULE APPTS ON 3/12/20 TO NEXT WEEK, STARTING 3/16/20. ANY DAY WILL WORK.    SHE WAS RELEASED FROM HOSPITAL ON 3/10/20 AND ISN'T PREPARED FOR 3/12/20 VISIT.    THE APPTS NEED TO BE RESCHEDULED AFTER 9:45AM AND BEFORE 2PM SO SHE CAN DROP OFF AND  HER KIDS FROM SCHOOL.    SHE HAS TO DRIVE FROM [place].    HER PHONE #: [phone]

## 2020-03-12 ENCOUNTER — APPOINTMENT (OUTPATIENT)
Dept: LAB | Facility: HOSPITAL | Age: 25
End: 2020-03-12

## 2020-03-13 ENCOUNTER — ROUTINE PRENATAL (OUTPATIENT)
Dept: OBSTETRICS AND GYNECOLOGY | Age: 25
End: 2020-03-13

## 2020-03-13 ENCOUNTER — PREP FOR SURGERY (OUTPATIENT)
Dept: OTHER | Facility: HOSPITAL | Age: 25
End: 2020-03-13

## 2020-03-13 VITALS — WEIGHT: 176 LBS | DIASTOLIC BLOOD PRESSURE: 60 MMHG | SYSTOLIC BLOOD PRESSURE: 100 MMHG | BODY MASS INDEX: 34.37 KG/M2

## 2020-03-13 DIAGNOSIS — Z13.0 SCREENING FOR IRON DEFICIENCY ANEMIA: Primary | ICD-10-CM

## 2020-03-13 DIAGNOSIS — O88.219 MATERNAL PULMONARY EMBOLUS (PE), CURRENT PREGNANCY: ICD-10-CM

## 2020-03-13 DIAGNOSIS — Z98.891 HISTORY OF CESAREAN DELIVERY: ICD-10-CM

## 2020-03-13 DIAGNOSIS — Z13.89 SCREENING FOR HEMATURIA OR PROTEINURIA: ICD-10-CM

## 2020-03-13 DIAGNOSIS — Z34.82 PRENATAL CARE, SUBSEQUENT PREGNANCY, SECOND TRIMESTER: ICD-10-CM

## 2020-03-13 DIAGNOSIS — Z13.1 SCREENING FOR DIABETES MELLITUS: ICD-10-CM

## 2020-03-13 DIAGNOSIS — Z3A.21 21 WEEKS GESTATION OF PREGNANCY: ICD-10-CM

## 2020-03-13 LAB
BILIRUB BLD-MCNC: NEGATIVE MG/DL
CLARITY, POC: CLEAR
COLOR UR: YELLOW
GLUCOSE UR STRIP-MCNC: NEGATIVE MG/DL
KETONES UR QL: ABNORMAL
LEUKOCYTE EST, POC: NEGATIVE
NITRITE UR-MCNC: NEGATIVE MG/ML
PH UR: 7 [PH] (ref 5–8)
PROT UR STRIP-MCNC: NEGATIVE MG/DL
RBC # UR STRIP: NEGATIVE /UL
SP GR UR: 1.03 (ref 1–1.03)
UROBILINOGEN UR QL: NORMAL

## 2020-03-13 PROCEDURE — 0502F SUBSEQUENT PRENATAL CARE: CPT | Performed by: OBSTETRICS & GYNECOLOGY

## 2020-03-13 PROCEDURE — 81002 URINALYSIS NONAUTO W/O SCOPE: CPT | Performed by: OBSTETRICS & GYNECOLOGY

## 2020-03-13 NOTE — PROGRESS NOTES
Pt presents for scheduled f/u OB visit. She has some occasional intermittent chest discomfort but nothing severe. She reports ongoing mild shortness of air with increased activity but not at rest. She is taking lovenox regularly.   She denies ctx/vag bleeding or leaking fluid  O:   Gen: pt in no distress  Heart: mildly tachycardic with regular rhythm  Lungs: clear bilaterally  A/p: pt s/p PE: pt is on lovenox twice daily. She has minimal symptoms currently. Advised her to return to ER for acute soa or chest pain or abnormal bleeding. Will check platelets with cbc today. She has appt with Heme MD next week and pulmonary MD on 3/26    Hx C/S X 3: pt plans repeat with BTI, referred to M for recommendations on c/s timing given multiple prior c/s and anticoagulation; advised pt to sign tubal papers today    Increased BMI: early one hour today    Tachycardia: pt followed by cardiology; normal echo during hospitalization    rtc 2 weeks

## 2020-03-14 LAB
BASOPHILS # BLD AUTO: 0.05 10*3/MM3 (ref 0–0.2)
BASOPHILS NFR BLD AUTO: 0.5 % (ref 0–1.5)
EOSINOPHIL # BLD AUTO: 0.07 10*3/MM3 (ref 0–0.4)
EOSINOPHIL NFR BLD AUTO: 0.8 % (ref 0.3–6.2)
ERYTHROCYTE [DISTWIDTH] IN BLOOD BY AUTOMATED COUNT: 13 % (ref 12.3–15.4)
GLUCOSE 1H P 50 G GLC PO SERPL-MCNC: 95 MG/DL (ref 65–179)
HCT VFR BLD AUTO: 34.5 % (ref 34–46.6)
HGB BLD-MCNC: 11.7 G/DL (ref 12–15.9)
IMM GRANULOCYTES # BLD AUTO: 0.07 10*3/MM3 (ref 0–0.05)
IMM GRANULOCYTES NFR BLD AUTO: 0.8 % (ref 0–0.5)
LYMPHOCYTES # BLD AUTO: 2.33 10*3/MM3 (ref 0.7–3.1)
LYMPHOCYTES NFR BLD AUTO: 25.6 % (ref 19.6–45.3)
MCH RBC QN AUTO: 30.3 PG (ref 26.6–33)
MCHC RBC AUTO-ENTMCNC: 33.9 G/DL (ref 31.5–35.7)
MCV RBC AUTO: 89.4 FL (ref 79–97)
MONOCYTES # BLD AUTO: 0.6 10*3/MM3 (ref 0.1–0.9)
MONOCYTES NFR BLD AUTO: 6.6 % (ref 5–12)
NEUTROPHILS # BLD AUTO: 5.98 10*3/MM3 (ref 1.7–7)
NEUTROPHILS NFR BLD AUTO: 65.7 % (ref 42.7–76)
NRBC BLD AUTO-RTO: 0 /100 WBC (ref 0–0.2)
PLATELET # BLD AUTO: 280 10*3/MM3 (ref 140–450)
RBC # BLD AUTO: 3.86 10*6/MM3 (ref 3.77–5.28)
WBC # BLD AUTO: 9.1 10*3/MM3 (ref 3.4–10.8)

## 2020-03-16 ENCOUNTER — TELEPHONE (OUTPATIENT)
Dept: OBSTETRICS AND GYNECOLOGY | Age: 25
End: 2020-03-16

## 2020-03-16 ENCOUNTER — HOSPITAL ENCOUNTER (OUTPATIENT)
Dept: ULTRASOUND IMAGING | Facility: HOSPITAL | Age: 25
Discharge: HOME | End: 2020-03-16
Admitting: INTERNAL MEDICINE

## 2020-03-16 ENCOUNTER — LAB (OUTPATIENT)
Dept: LAB | Facility: HOSPITAL | Age: 25
End: 2020-03-16

## 2020-03-16 ENCOUNTER — CONSULT (OUTPATIENT)
Dept: ONCOLOGY | Facility: CLINIC | Age: 25
End: 2020-03-16

## 2020-03-16 ENCOUNTER — TRANSCRIBE ORDERS (OUTPATIENT)
Dept: ULTRASOUND IMAGING | Facility: HOSPITAL | Age: 25
End: 2020-03-16

## 2020-03-16 VITALS
TEMPERATURE: 98 F | OXYGEN SATURATION: 99 % | BODY MASS INDEX: 35.2 KG/M2 | SYSTOLIC BLOOD PRESSURE: 132 MMHG | HEART RATE: 70 BPM | DIASTOLIC BLOOD PRESSURE: 84 MMHG | HEIGHT: 60 IN | WEIGHT: 179.3 LBS | RESPIRATION RATE: 16 BRPM

## 2020-03-16 DIAGNOSIS — O99.019 IRON DEFICIENCY ANEMIA DURING PREGNANCY: ICD-10-CM

## 2020-03-16 DIAGNOSIS — D50.9 IRON DEFICIENCY ANEMIA DURING PREGNANCY: ICD-10-CM

## 2020-03-16 DIAGNOSIS — O88.219 MATERNAL PULMONARY EMBOLUS (PE), CURRENT PREGNANCY: Primary | ICD-10-CM

## 2020-03-16 DIAGNOSIS — T45.4X5A ADVERSE EFFECT OF IRON AND ITS COMPOUNDS, INITIAL ENCOUNTER: ICD-10-CM

## 2020-03-16 DIAGNOSIS — I26.99 ACUTE PULMONARY EMBOLISM WITHOUT ACUTE COR PULMONALE, UNSPECIFIED PULMONARY EMBOLISM TYPE (HCC): Primary | ICD-10-CM

## 2020-03-16 LAB
BASOPHILS # BLD AUTO: 0.03 10*3/MM3 (ref 0–0.2)
BASOPHILS NFR BLD AUTO: 0.3 % (ref 0–1.5)
DEPRECATED RDW RBC AUTO: 42.3 FL (ref 37–54)
EOSINOPHIL # BLD AUTO: 0.06 10*3/MM3 (ref 0–0.4)
EOSINOPHIL NFR BLD AUTO: 0.6 % (ref 0.3–6.2)
ERYTHROCYTE [DISTWIDTH] IN BLOOD BY AUTOMATED COUNT: 13.5 % (ref 12.3–15.4)
FERRITIN SERPL-MCNC: 12.2 NG/ML (ref 11–207)
HCT VFR BLD AUTO: 32.8 % (ref 34–46.6)
HGB BLD-MCNC: 11.3 G/DL (ref 12–15.9)
IMM GRANULOCYTES # BLD AUTO: 0.11 10*3/MM3 (ref 0–0.05)
IMM GRANULOCYTES NFR BLD AUTO: 1.2 % (ref 0–0.5)
IRON 24H UR-MRATE: 47 MCG/DL (ref 37–145)
IRON SATN MFR SERPL: 9 % (ref 14–48)
LYMPHOCYTES # BLD AUTO: 2.36 10*3/MM3 (ref 0.7–3.1)
LYMPHOCYTES NFR BLD AUTO: 25.3 % (ref 19.6–45.3)
MCH RBC QN AUTO: 30.2 PG (ref 26.6–33)
MCHC RBC AUTO-ENTMCNC: 34.5 G/DL (ref 31.5–35.7)
MCV RBC AUTO: 87.7 FL (ref 79–97)
MONOCYTES # BLD AUTO: 0.83 10*3/MM3 (ref 0.1–0.9)
MONOCYTES NFR BLD AUTO: 8.9 % (ref 5–12)
NEUTROPHILS # BLD AUTO: 5.92 10*3/MM3 (ref 1.7–7)
NEUTROPHILS NFR BLD AUTO: 63.7 % (ref 42.7–76)
NRBC BLD AUTO-RTO: 0 /100 WBC (ref 0–0.2)
PLATELET # BLD AUTO: 248 10*3/MM3 (ref 140–450)
PMV BLD AUTO: 10.2 FL (ref 6–12)
RBC # BLD AUTO: 3.74 10*6/MM3 (ref 3.77–5.28)
TIBC SERPL-MCNC: 515 MCG/DL (ref 249–505)
TRANSFERRIN SERPL-MCNC: 368 MG/DL (ref 200–360)
WBC NRBC COR # BLD: 9.31 10*3/MM3 (ref 3.4–10.8)

## 2020-03-16 PROCEDURE — 99244 OFF/OP CNSLTJ NEW/EST MOD 40: CPT | Performed by: INTERNAL MEDICINE

## 2020-03-16 PROCEDURE — 82728 ASSAY OF FERRITIN: CPT | Performed by: INTERNAL MEDICINE

## 2020-03-16 PROCEDURE — 85025 COMPLETE CBC W/AUTO DIFF WBC: CPT

## 2020-03-16 PROCEDURE — 84466 ASSAY OF TRANSFERRIN: CPT | Performed by: INTERNAL MEDICINE

## 2020-03-16 PROCEDURE — 81240 F2 GENE: CPT | Performed by: INTERNAL MEDICINE

## 2020-03-16 PROCEDURE — 81241 F5 GENE: CPT | Performed by: INTERNAL MEDICINE

## 2020-03-16 PROCEDURE — 85300 ANTITHROMBIN III ACTIVITY: CPT | Performed by: INTERNAL MEDICINE

## 2020-03-16 PROCEDURE — 83540 ASSAY OF IRON: CPT | Performed by: INTERNAL MEDICINE

## 2020-03-16 PROCEDURE — 36415 COLL VENOUS BLD VENIPUNCTURE: CPT

## 2020-03-16 PROCEDURE — 76811 OB US DETAILED SNGL FETUS: CPT

## 2020-03-16 RX ORDER — SODIUM CHLORIDE 9 MG/ML
250 INJECTION, SOLUTION INTRAVENOUS ONCE
Status: CANCELLED | OUTPATIENT
Start: 2020-03-20

## 2020-03-16 RX ORDER — FAMOTIDINE 20 MG/1
20 TABLET, FILM COATED ORAL ONCE
Status: CANCELLED | OUTPATIENT
Start: 2020-03-20

## 2020-03-16 RX ORDER — DIPHENHYDRAMINE HYDROCHLORIDE 50 MG/ML
25 INJECTION INTRAMUSCULAR; INTRAVENOUS ONCE
Status: CANCELLED | OUTPATIENT
Start: 2020-03-20

## 2020-03-16 RX ORDER — ACETAMINOPHEN 325 MG/1
650 TABLET ORAL ONCE
Status: CANCELLED | OUTPATIENT
Start: 2020-03-20

## 2020-03-16 RX ORDER — DIPHENHYDRAMINE HCL 25 MG
25 CAPSULE ORAL ONCE
Status: CANCELLED | OUTPATIENT
Start: 2020-03-20

## 2020-03-16 NOTE — PROGRESS NOTES
.     REASON FOR CONSULTATION:     Provide an opinion on any further workup or treatment of pulmonary emboli with Lovenox anticoagulation, she is 22 &1/2 weeks pregnant.                             REQUESTING PHYSICIAN: Noemi Mittal MD    RECORDS OBTAINED:  Records of the patients history including those obtained from the referring provider were reviewed and summarized in detail.    HISTORY OF PRESENT ILLNESS:  The patient is a 24 y.o. year old female who presented for initial evaluation today on 3/16/2020, referred by her GYN specialist, Dr. Mittal, because of newly diagnosed pulmonary emboli.     Patient reports she had episodes of flu back in 01/2020. She had lingering issues with some dyspnea. Patient also reported she had tachycardia associated with her shortness of breath. She was seen by Cardiology service and thought related to her pregnancy.  She did have worsening problem and eventually had CT angiogram study on 03/07/2020 when she was evaluated in the emergency room and admitted to the hospital for worsening dyspnea. This study reported several small pulmonary emboli in the adjacent branches of the right main pulmonary artery. There were no lung lesions. The upper abdomen showed unremarkable liver, spleen and adrenal glands. Patient was started on Lovenox every 12 hours anticoagulation.     Patient reports bruising at the site of Lovenox injection. She currently uses 80 mg q.12 h. She reports no spontaneous epistaxis or gum bleeding or any other bleeding problem.     Patient currently is 22 and 1/2 weeks pregnant. She reports no previous history of thrombophilia. She had previous 3 C-sections with successful pregnancies. She reports previous use of birth control pills for about 3 years without problem. She also has no family history for thrombophilia.     Patient recently was started on oral iron treatment during her hospitalization in early 3/2020. This patient had normal laboratory study, CBC on  2019 with hemoglobin 12.9, MCV 87 and platelets 394,000, WBC 6600. Her labs on 2020 reported hemoglobin 12.1, MCV 88.8, platelets 294,000 and WBC 8550 including ANC 5630, lymphocytes 2170. She had normal TSH 1.66 that day. On 2020, she had hemoglobin 11.7. On 2020, her hemoglobin was 10.7 with MCV 88.7, MCHC 34.2. Normal platelets and WBC.     Patient reports she was not able to tolerate oral iron once a day. She has not been taking iron for the past few days. She reports constipation secondary to oral iron treatment. Patient also reports previously she was given oral iron treatment during her 1st pregnancy and she did not tolerate it.     Laboratory study today reported hemoglobin 11.3, MCV 87.7, MCHC 34.5. Platelets 248,000 and WBC 9300 including ANC 5920, lymphocytes 2360.      Patient currently has normal pulse of 70 and her O2 saturation is 99% with /84.           Past Medical History:   Diagnosis Date   • Abnormal Pap smear of cervix    • Chlamydia    • H/O Depressive disorder    • Herpes    • History of anxiety    • History of urinary tract infection    • Pulmonary embolism (CMS/HCC) 2020    during pregnancy     Past Surgical History:   Procedure Laterality Date   •  SECTION  , , 2015    x3        MEDICATIONS    Current Outpatient Medications:   •  enoxaparin (LOVENOX) 80 MG/0.8ML solution syringe, Inject 0.8 mL under the skin into the appropriate area as directed every 12 (Twelve) hours for 30 days. Indications: DVT/PE (active thrombosis), Disp: 48 mL, Rfl: 2  •  ferrous sulfate 325 (65 FE) MG EC tablet, Take 1 tablet by mouth Daily With Breakfast., Disp: 30 tablet, Rfl: 3  •  Prenatal Vit-Fe Fumarate-FA (PRENATAL, CLASSIC, VITAMIN) 28-0.8 MG tablet tablet, Take  by mouth Daily., Disp: , Rfl:     ALLERGIES:   No Known Allergies    SOCIAL HISTORY:       Social History     Socioeconomic History   • Marital status: Single     Spouse name: Not on file   • Number  of children: 3   • Years of education: Not on file   • Highest education level: Not on file   Occupational History   • Occupation: Nurse     Employer: [employer]   Tobacco Use   • Smoking status: Never Smoker   • Smokeless tobacco: Never Used   Substance and Sexual Activity   • Alcohol use: No   • Drug use: No   • Sexual activity: Yes     Partners: Male   Patient is a rare social drinker.        FAMILY HISTORY:  Family History   Problem Relation Age of Onset   • Diabetes Maternal Uncle    • Breast cancer Paternal Grandmother    • Breast cancer Paternal Aunt    • Breast cancer Paternal Aunt    • Thyroid disease Mother    No hematology disorder in the family, including thrombophilia.    REVIEW OF SYSTEMS:  Review of Systems   Constitutional: Negative for appetite change, fatigue and unexpected weight change.   HENT: Negative for congestion, mouth sores, nosebleeds and sore throat.         No gum bleeding   Eyes: Negative for photophobia and visual disturbance.   Respiratory: Positive for shortness of breath (Mild exertional dyspnea).    Cardiovascular: Negative for chest pain and leg swelling.   Gastrointestinal: Negative for abdominal pain, blood in stool and nausea.   Endocrine: Negative for cold intolerance and polydipsia.   Genitourinary: Negative for dysuria and hematuria.   Musculoskeletal: Negative for back pain and joint swelling.   Skin: Negative for color change and rash.   Allergic/Immunologic: Positive for immunocompromised state (Patient is 22 weeks pregnant on 3/16/2020). Negative for environmental allergies.   Neurological: Negative for dizziness, numbness and headaches.   Hematological: Negative for adenopathy. Bruises/bleeds easily (At the site of Lovenox injection).   Psychiatric/Behavioral: Negative for agitation and confusion.              Vitals:    03/16/20 1436   BP: 132/84   Pulse: 70   Resp: 16   Temp: 98 °F (36.7 °C)   SpO2: 99%   Weight: 81.3 kg (179 lb 4.8 oz)   Height:  "152 cm (59.84\")   PainSc: 0-No pain     Current Status 3/16/2020   ECOG score 0       PHYSICAL EXAM:      CONSTITUTIONAL:  Vital signs reviewed.  Well-developed well-nourished female, no distress, looks comfortable.  EYES:  Conjunctiva and lids unremarkable.  PERRLA  EARS,NOSE,MOUTH,THROAT:  Ears and nose appear unremarkable.  Oral mucosa moist.  Lips, teeth, gums appear unremarkable.  RESPIRATORY:  Normal respiratory effort.  Lungs clear to auscultation bilaterally.  CARDIOVASCULAR: Regular rhythm and rate.  Normal S1, S2.  No murmurs rubs or gallops.  No significant lower extremity edema.  GASTROINTESTINAL: Patient is pregnant.  Limited examination.  Bowel sounds present.    LYMPHATIC:  No cervical, supraclavicular, axillary lymphadenopathy.  MUSCULOSKELETAL:  Unremarkable gait and station.  Unremarkable digits/nails.  No cyanosis or clubbing.  SKIN:  Warm.  No rashes.  PSYCHIATRIC:  Normal judgment and insight.  Normal mood and affect.      RECENT LABS:  Lab Results   Component Value Date    WBC 9.31 03/16/2020    HGB 11.3 (L) 03/16/2020    HCT 32.8 (L) 03/16/2020    MCV 87.7 03/16/2020     03/16/2020     Lab Results   Component Value Date    NEUTROABS 5.92 03/16/2020         Assessment/Plan      Acute pulmonary embolism without acute cor pulmonale, unspecified pulmonary embolism type (CMS/HCC)  - Antithrombin III  - Factor 5 Leiden  - Cardiolipin Antibody  - Beta-2 Glycoprotein Antibodies  - Lupus Anticoagulant  - Prothrombin gene mutation  - Antiphosphotidyl Antibodies Panel II    Iron deficiency anemia during pregnancy  - Ferritin  - Iron Profile  - CBC & Differential  - Iron Profile  - Ferritin    Adverse effect of iron and its compounds, initial encounter    ASSESSMENT:   1. Pulmonary emboli involving the right main pulmonary artery during her pregnancy at 21 weeks. I agree for the patient to continue Lovenox injection 80 mg q.12 h.     Nevertheless, I recommended to have laboratory studies for " hypercoagulable status for antiphospholipid syndrome and also we will check both factor II and factor V Leiden gene mutations together with antithrombin III. I advised against protein C and protein S study since she is pregnant and those levels would be artificially low. I recommended to test those in the future, 2 months after her delivery.     I discussed with the patient for the length of treatment, since she had a small pulmonary emboli, if her laboratory study was benign then she could be treated for 6 months and then stop. If she has positive antiphospholipid antibodies then this needs to be reevaluated after 6 months anticoagulation.   2. Anemia, unable to tolerate oral iron treatment once a day because of constipation. I looked at her laboratory results. There was no documented iron study. I recommended to obtain a baseline today. I recommended intravenous iron therapy with Venofer 300 mg weekly for 3 doses and patient is agreeable.     PLAN:   1. Obtain laboratory study today for hypercoagulable workup without protein S or protein C profile.   - Antithrombin III  - Factor 5 Leiden  - Cardiolipin Antibody  - Beta-2 Glycoprotein Antibodies  - Lupus Anticoagulant  - Prothrombin gene mutation  - Antiphosphotidyl Antibodies Panel II  2.  Iron deficiency anemia during pregnancy  - Ferritin  - Iron Profile  3. Arrange iv. Venofer 300 mg weekly for 3 doses.   4. We will arrange patient to come back for reevaluation in 2 months. We will repeat a CBC, a stat iron and ferritin level.     Thank you for letting us to participate in the care of this patient.      Addendum:  Lab Results   Component Value Date    IRON 47 03/16/2020    TIBC 515 (H) 03/16/2020    FERRITIN 12.20 03/16/2020      Iron saturation 9%.    Clearly patient has iron deficiency.  We will treat her with Venofer as planned.      ANA JETER M.D., Ph.D.    3/16/2020     CC:  Noemi Mittal MD      Indeed it looks like more associated with her  pulmonary emboli.

## 2020-03-16 NOTE — TELEPHONE ENCOUNTER
----- Message from Noemi Mittal MD sent at 3/15/2020 12:41 PM EDT -----  Please call Sandra, she passed her early one hour and her hgb is improving. Advise she continue her iron supplement.

## 2020-03-16 NOTE — CONSULTS
Ms. Valadez is referred by Dr Mittal for MFM consultation on indication of: Hx Pulmonary embolism on enoxaparin 1mg/kg bid  She is accompanied by her young daughter Mare, who was present throughout the ultrasound exam and consultation.    21  at 21 3/ per DEACON 20    Prenatal course is significant for    Tachycardia, shortness of breath, hospitalized 3/7-10/2020  CT angiogram several small PEs  LE Doppler negative  Echocardiogram unremarkable      PMH  OB History    Para Term  AB Living   5 3 3 0 1 3   SAB TAB Ectopic Molar Multiple Live Births   1 0 0 0 0 3      # Outcome Date GA Lbr Alphonso/2nd Weight Sex Delivery Anes PTL Lv   5 Current            4 Term    3629 g (8 lb) F CS-LTranv   [place]   3 Term    3544 g (7 lb 13 oz) F CS-LTranv  N [place]   2 Term    4252 g (9 lb 6 oz) M CS-LTranv  N [place]      Birth Comments: for suspected macrosomia    1 SAB  12w0d    SAB         Birth Comments: no D&C        Past Medical History:   Diagnosis Date   • Abnormal Pap smear of cervix    • Chlamydia    • H/O Depressive disorder    • Herpes    • History of anxiety    • History of urinary tract infection    • Pulmonary embolism (CMS/HCC) 2020    during pregnancy       No Known Allergies    Past Surgical History:   Procedure Laterality Date   •  SECTION  , , 2015    x3            Current Outpatient Medications:   •  enoxaparin (LOVENOX) 80 MG/0.8ML solution syringe, Inject 0.8 mL under the skin into the appropriate area as directed every 12 (Twelve) hours for 30 days. Indications: DVT/PE (active thrombosis), Disp: 48 mL, Rfl: 2  •  ferrous sulfate 325 (65 FE) MG EC tablet, Take 1 tablet by mouth Daily With Breakfast., Disp: 30 tablet, Rfl: 3  •  Prenatal Vit-Fe Fumarate-FA (PRENATAL, CLASSIC, VITAMIN) 28-0.8 MG tablet tablet, Take  by mouth Daily., Disp: , Rfl:       Family history is negative for mental retardation, trisomy 21, congenital heart disease, spina bifida, cystic  "fibrosis, muscular dystrophy, other birth defects, [place] ancestry.   The patient has a family history of: none of the above.  Patient denies a family history of blood clots.    Social history  Smoking status:  No  Smokeless tobacco:   Alcohol use:  No  Street drug use: No  Employment:  ED nurse at [employer]      Px:  /68 P  116  Wt 191.4   Ht 60\"    Constitutional:  Pleasant WF in mid second trimester, appears comfortable    Abd:  Bruises from enoxaparin  Fundal ht: AGA    Extrem:   Ankles no edema    Neuro: gait normal.     Labs    3/13/20:  H/H 11.7g/34.5%   Plt 280  D-dimer 0.75  MSAFP 1:6499 ONTD  NIPT neg  Carrier screen neg for 4/4    See US report    Anatomic survey did not detect abnormality.   Additional views are needed of great vessels    Impression:  21 3/7 per DEACON 7/24/20    PE on enoxaprin 1mg/kg bid  Etiology of PE is not apparent.   Pt denies history of travel.  States she is active.     BMI 34.5    Iron deficiency anemia         Recommendations:  Evaluate interval growth at 28 weeks, to include additional views of great vessels; longitudinal views.     Initiate weekly BPP at least by 36 weeks per BMI associated increased incidence of late third trimester demise.     Continue enoxaparin until 24 hours before scheduled c-s delivery.    If labor is suspected at any time, consider holding enoxaparin dose.   At a national conference several experts discussed alternatives in enoxaparin management prior to delivery.  The paradigm of changing to unfractionated heparin at 36 weeks is no longer a consensus  recommendendation.      Peripartum SCDs and whenever in bed.  Continue LMWH x 3 months after delivery.    I specifically recommend against coumadin due to high frequency of coumadin related complications.     Serum ferritin and B12 at 28 weeks gestation    Keep weight steady throughout pregnancy.   I encouraged activity as tolerated, optimally at least 30 minutes of brisk walking.    Regarding " delivery timing; elective repeat c-s is optimally scheduled at 39 weeks pending specific indication for earlier delivery (eg hypertension, growth restriction).     Pt has scheduled hematology consultation, approximately 2 weeks from now. I will defer thrombophilia screening to hematology.     For billing purposes, duration of face to face consultation was approximately 30 minutes of which > 50% was devoted to patient counseling and coordination of care, exclusive of US exam.

## 2020-03-17 ENCOUNTER — TELEPHONE (OUTPATIENT)
Dept: ONCOLOGY | Facility: CLINIC | Age: 25
End: 2020-03-17

## 2020-03-17 DIAGNOSIS — D50.9 IRON DEFICIENCY ANEMIA DURING PREGNANCY: Primary | ICD-10-CM

## 2020-03-17 DIAGNOSIS — O99.019 IRON DEFICIENCY ANEMIA DURING PREGNANCY: Primary | ICD-10-CM

## 2020-03-17 LAB
AT III PPP CHRO-ACNC: 82 % (ref 90–134)
CARDIOLIPIN IGA SER IA-ACNC: <9 APL U/ML (ref 0–11)
CARDIOLIPIN IGG SER IA-ACNC: <9 GPL U/ML (ref 0–14)
CARDIOLIPIN IGM SER IA-ACNC: <9 MPL U/ML (ref 0–12)
F5 GENE MUT ANL BLD/T: NORMAL
FACTOR II, DNA ANALYSIS: NORMAL

## 2020-03-17 NOTE — TELEPHONE ENCOUNTER
Per Dr. Medina last dictation pt is to receive 3 doses of IV Venofer and a 2 month follow up with him. Message sent to scheduling for appt, Onc Nurses for TX plan. Orders placed for 2 month lab and Message left for pt informing her. Advised pt to call with questions.

## 2020-03-17 NOTE — TELEPHONE ENCOUNTER
----- Message from Anais Arreaga RN sent at 3/17/2020 12:14 PM EDT -----  3 doses of IV venofer per Dr. Ho.   2 month follow-up with Dr. Ho lab appt as well.

## 2020-03-18 ENCOUNTER — PREP FOR SURGERY (OUTPATIENT)
Dept: OTHER | Facility: HOSPITAL | Age: 25
End: 2020-03-18

## 2020-03-18 DIAGNOSIS — Z30.2 REQUEST FOR STERILIZATION: ICD-10-CM

## 2020-03-18 DIAGNOSIS — Z98.891 HISTORY OF CESAREAN DELIVERY: Primary | ICD-10-CM

## 2020-03-18 LAB
APTT HEX PL PPP: 0 SEC (ref 0–11)
B2 GLYCOPROT1 IGA SER-ACNC: <9 GPI IGA UNITS (ref 0–25)
B2 GLYCOPROT1 IGG SER-ACNC: <9 GPI IGG UNITS (ref 0–20)
B2 GLYCOPROT1 IGM SER-ACNC: <9 GPI IGM UNITS (ref 0–32)
LA NT DPL PPP: 60.1 SEC (ref 0–55)
LA NT DPL/LA NT HPL PPP-RTO: 1.42 RATIO (ref 0–1.4)
LA NT PLATELET PPP: 83 SEC (ref 0–51.9)
LUPUS ANTICOAGULANT REFLEX: ABNORMAL
PTT LA MIX: 59.7 SEC (ref 0–48.9)
SCREEN DRVVT: 40.4 SEC (ref 0–47)
THROMBIN TIME: 17.3 SEC (ref 0–23)

## 2020-03-19 PROBLEM — Z30.2 REQUEST FOR STERILIZATION: Status: ACTIVE | Noted: 2020-03-19

## 2020-03-20 ENCOUNTER — APPOINTMENT (OUTPATIENT)
Dept: OTHER | Facility: HOSPITAL | Age: 25
End: 2020-03-20

## 2020-03-20 ENCOUNTER — TELEPHONE (OUTPATIENT)
Dept: OBSTETRICS AND GYNECOLOGY | Age: 25
End: 2020-03-20

## 2020-03-20 NOTE — TELEPHONE ENCOUNTER
Attempted to reach pt; left voice message.    Informed pt to call me back regarding the date of her repeat  w/BTL.

## 2020-03-20 NOTE — TELEPHONE ENCOUNTER
Dr Mittal pt is asking if she can move her scheduled repeat C-sec from Friday 7/10 @ 9am to Wed 7/8, Tues 7/14 or Wed 7/15? Her boyfriend works at ([employer]) which will be shutdown the week of July 6th. He will be off on the other dates mentioned. He would like to be able help care for the pt and baby if possible. Please Advise

## 2020-03-21 ENCOUNTER — APPOINTMENT (OUTPATIENT)
Dept: ONCOLOGY | Facility: HOSPITAL | Age: 25
End: 2020-03-21

## 2020-03-23 ENCOUNTER — TELEPHONE (OUTPATIENT)
Dept: OBSTETRICS AND GYNECOLOGY | Age: 25
End: 2020-03-23

## 2020-03-23 NOTE — TELEPHONE ENCOUNTER
(Kyrie pt) Pt is 22 weeks pregnant and she was recently diagnosed with blood clots in her lungs. Pt is still having trouble breathing and Dr. Mittal advised patient to speak with her pulmonary doctor about an inhaler however patient was unable to get an appt until the end of the week and she is unable to speak over the phone with them. Patient is wanting Dr. Mittal opinion, denies any other symptoms. Please advise.       [phone]

## 2020-03-23 NOTE — TELEPHONE ENCOUNTER
Called pt back. She denies any significant shortness of air. She denies cough or fever. She has appt this week with pulmonologist. Advised her to keep appt as scheduled but proceed to ER with any acute issues. She denies any OB issues noting normal fetal movement and denies any ctx, vag bleeding or leaking fluid.

## 2020-03-25 ENCOUNTER — TELEPHONE (OUTPATIENT)
Dept: OBSTETRICS AND GYNECOLOGY | Age: 25
End: 2020-03-25

## 2020-03-25 ENCOUNTER — INFUSION (OUTPATIENT)
Dept: ONCOLOGY | Facility: HOSPITAL | Age: 25
End: 2020-03-25

## 2020-03-25 VITALS
HEART RATE: 72 BPM | SYSTOLIC BLOOD PRESSURE: 123 MMHG | DIASTOLIC BLOOD PRESSURE: 77 MMHG | BODY MASS INDEX: 35.73 KG/M2 | TEMPERATURE: 98.1 F | OXYGEN SATURATION: 98 % | WEIGHT: 182 LBS

## 2020-03-25 DIAGNOSIS — T45.4X5A ADVERSE EFFECT OF IRON AND ITS COMPOUNDS, INITIAL ENCOUNTER: Primary | ICD-10-CM

## 2020-03-25 DIAGNOSIS — D50.9 IRON DEFICIENCY ANEMIA DURING PREGNANCY: ICD-10-CM

## 2020-03-25 DIAGNOSIS — O99.019 IRON DEFICIENCY ANEMIA DURING PREGNANCY: ICD-10-CM

## 2020-03-25 LAB
ANTI-PHOSPHATIDIC ACID: NORMAL
ANTI-PHOSPHATIDIC,IGA: 5.6 U/ML
ANTI-PHOSPHATIDIC,IGG: 2.7 U/ML
ANTI-PHOSPHATIDIC,IGM: 9.7 U/ML
ANTI-PHOSPHATIDYL GLYCEROL, IGA: 3.1 U/ML
ANTI-PHOSPHATIDYL GLYCEROL, IGG: 2.6 U/ML
ANTI-PHOSPHATIDYL GLYCEROL, IGM: 3.2 U/ML
ANTI-PHOSPHATIDYL GLYCEROL: NORMAL
ANTI-PHOSPHATIDYL INOSITOL: NORMAL
ANTI-PHOSPHATIDYL,IGA: 2.1 U/ML
ANTI-PHOSPHATIDYL,IGG: 2.5 U/ML
ANTI-PHOSPHATIDYL,IGM: 4.4 U/ML
ANTI-PHOSPHATIDYLETHANOLAMINE, IGA: 1.9 U/ML
ANTI-PHOSPHATIDYLETHANOLAMINE, IGG: 2.3 U/ML
ANTI-PHOSPHATIDYLETHANOLAMINE, IGM: 3.4 U/ML
ANTI-PHOSPHATIDYLETHANOLAMINE: NORMAL

## 2020-03-25 PROCEDURE — 63710000001 DIPHENHYDRAMINE PER 50 MG: Performed by: INTERNAL MEDICINE

## 2020-03-25 PROCEDURE — 96365 THER/PROPH/DIAG IV INF INIT: CPT

## 2020-03-25 PROCEDURE — 96366 THER/PROPH/DIAG IV INF ADDON: CPT

## 2020-03-25 PROCEDURE — 25010000002 IRON SUCROSE PER 1 MG: Performed by: INTERNAL MEDICINE

## 2020-03-25 RX ORDER — DIPHENHYDRAMINE HCL 25 MG
25 CAPSULE ORAL ONCE
Status: CANCELLED | OUTPATIENT
Start: 2020-03-26

## 2020-03-25 RX ORDER — DIPHENHYDRAMINE HCL 25 MG
25 CAPSULE ORAL ONCE
Status: COMPLETED | OUTPATIENT
Start: 2020-03-25 | End: 2020-03-25

## 2020-03-25 RX ORDER — FAMOTIDINE 20 MG/1
20 TABLET, FILM COATED ORAL ONCE
Status: DISCONTINUED | OUTPATIENT
Start: 2020-03-25 | End: 2020-03-25 | Stop reason: HOSPADM

## 2020-03-25 RX ORDER — DIPHENHYDRAMINE HYDROCHLORIDE 50 MG/ML
25 INJECTION INTRAMUSCULAR; INTRAVENOUS ONCE
Status: CANCELLED | OUTPATIENT
Start: 2020-03-26

## 2020-03-25 RX ORDER — ACETAMINOPHEN 325 MG/1
650 TABLET ORAL ONCE
Status: CANCELLED | OUTPATIENT
Start: 2020-03-26

## 2020-03-25 RX ORDER — SODIUM CHLORIDE 9 MG/ML
250 INJECTION, SOLUTION INTRAVENOUS ONCE
Status: COMPLETED | OUTPATIENT
Start: 2020-03-25 | End: 2020-03-25

## 2020-03-25 RX ORDER — ACETAMINOPHEN 325 MG/1
650 TABLET ORAL ONCE
Status: COMPLETED | OUTPATIENT
Start: 2020-03-25 | End: 2020-03-25

## 2020-03-25 RX ORDER — SODIUM CHLORIDE 9 MG/ML
250 INJECTION, SOLUTION INTRAVENOUS ONCE
Status: CANCELLED | OUTPATIENT
Start: 2020-03-26

## 2020-03-25 RX ORDER — FAMOTIDINE 20 MG/1
20 TABLET, FILM COATED ORAL ONCE
Status: CANCELLED | OUTPATIENT
Start: 2020-03-26

## 2020-03-25 RX ADMIN — SODIUM CHLORIDE 300 MG: 900 INJECTION, SOLUTION INTRAVENOUS at 09:41

## 2020-03-25 RX ADMIN — SODIUM CHLORIDE 250 ML: 9 INJECTION, SOLUTION INTRAVENOUS at 08:45

## 2020-03-25 RX ADMIN — ACETAMINOPHEN 650 MG: 325 TABLET ORAL at 09:13

## 2020-03-25 RX ADMIN — DIPHENHYDRAMINE HYDROCHLORIDE 25 MG: 25 CAPSULE ORAL at 09:13

## 2020-03-26 ENCOUNTER — ROUTINE PRENATAL (OUTPATIENT)
Dept: OBSTETRICS AND GYNECOLOGY | Age: 25
End: 2020-03-26

## 2020-03-26 VITALS — SYSTOLIC BLOOD PRESSURE: 100 MMHG | BODY MASS INDEX: 35.26 KG/M2 | WEIGHT: 179.6 LBS | DIASTOLIC BLOOD PRESSURE: 60 MMHG

## 2020-03-26 DIAGNOSIS — D50.9 IRON DEFICIENCY ANEMIA DURING PREGNANCY: ICD-10-CM

## 2020-03-26 DIAGNOSIS — O99.019 IRON DEFICIENCY ANEMIA DURING PREGNANCY: ICD-10-CM

## 2020-03-26 DIAGNOSIS — Z3A.22 22 WEEKS GESTATION OF PREGNANCY: Primary | ICD-10-CM

## 2020-03-26 DIAGNOSIS — Z98.891 HISTORY OF CESAREAN DELIVERY: ICD-10-CM

## 2020-03-26 DIAGNOSIS — O88.219 MATERNAL PULMONARY EMBOLUS (PE), CURRENT PREGNANCY: ICD-10-CM

## 2020-03-26 PROCEDURE — 0502F SUBSEQUENT PRENATAL CARE: CPT | Performed by: OBSTETRICS & GYNECOLOGY

## 2020-03-26 PROCEDURE — 81002 URINALYSIS NONAUTO W/O SCOPE: CPT | Performed by: OBSTETRICS & GYNECOLOGY

## 2020-03-26 NOTE — PROGRESS NOTES
Pt comes in for follow-up evaluation. She notes the pulmonologist cancelled her visit to avoid exposure. She comes here today noting more shortness of air lately. Reports her symptoms at times are similar to when she went to ER initially.  O: pt in no distress, appears comfortable with conversation  Lungs: clear bilaterally, normal respiratory effort   Heart: regular rate and rhythm  FH-23 cm  FHT's: 154  Ext: no cords or edema  A/p: PE in pregnancy: pt on lovenox 80 mg twice daily; heme is following and she saw KAREN Evans. Plan per [facility] is to remain on lovenox until 24 hours prior to c/s. Pt counseled that she would need to hold her dose if she had any labor symptoms due to risks of bleeding and spinal anesthesia with anticoagulation. Plan anesthesia consult in 3rd trimester; pt's appt with pulmonary cancelled by pulmonary MD. She reports she spoke with MD and they advised her symptoms are likely residual from PE. She appears stable during exam today. Still, I expressed concern to pt given her reported worsening of symptoms. Recommended pt return to ER for evaluation and advised I would contact MD regarding evaluation and issues so she could go directly to ER room. Pt declines ER evaluation at this time. She agrees to go in if she notes any worsening of her symptoms.    Iveth ARANGO will follow. Reviewed labs. Pt had LAC present, APL antibodies were negative. She has f/u appt with Iveth ARANGO for further review. Plan growth u/s at 28 weeks. Pt reports she is receiving iron infusions weekly per Iveth ARANGO as well. She received 1 this week. She denies any issues during infusion.     RTC 3 weeks with repeat one hour    Hx c/s X 3: plan repeat at 38 weeks due to above issues. Pt counseled that small risk of prematurity but risks to unscheduled delivery given anticoagulation. Pt agrees. [facility] did not recommend delivery any earlier than this. Pt has signed tubal papers to proceed at time of c/s.

## 2020-03-27 ENCOUNTER — TELEPHONE (OUTPATIENT)
Dept: OBSTETRICS AND GYNECOLOGY | Age: 25
End: 2020-03-27

## 2020-03-27 ENCOUNTER — HOSPITAL ENCOUNTER (EMERGENCY)
Facility: HOSPITAL | Age: 25
Discharge: HOME | End: 2020-03-27
Attending: OBSTETRICS & GYNECOLOGY | Admitting: OBSTETRICS & GYNECOLOGY

## 2020-03-27 VITALS
HEIGHT: 60 IN | TEMPERATURE: 97.9 F | DIASTOLIC BLOOD PRESSURE: 63 MMHG | BODY MASS INDEX: 35.34 KG/M2 | HEART RATE: 72 BPM | OXYGEN SATURATION: 99 % | SYSTOLIC BLOOD PRESSURE: 104 MMHG | WEIGHT: 180 LBS | RESPIRATION RATE: 18 BRPM

## 2020-03-27 LAB
ALBUMIN SERPL-MCNC: 3.6 G/DL (ref 3.5–5.2)
ALBUMIN/GLOB SERPL: 1.2 G/DL
ALP SERPL-CCNC: 51 U/L (ref 39–117)
ALT SERPL W P-5'-P-CCNC: 9 U/L (ref 1–33)
ANION GAP SERPL CALCULATED.3IONS-SCNC: 11.3 MMOL/L (ref 5–15)
AST SERPL-CCNC: 11 U/L (ref 1–32)
BASOPHILS # BLD AUTO: 0.02 10*3/MM3 (ref 0–0.2)
BASOPHILS NFR BLD AUTO: 0.2 % (ref 0–1.5)
BILIRUB SERPL-MCNC: <0.2 MG/DL (ref 0.2–1.2)
BILIRUB UR QL STRIP: NEGATIVE
BUN BLD-MCNC: 10 MG/DL (ref 6–20)
BUN/CREAT SERPL: 20.4 (ref 7–25)
CALCIUM SPEC-SCNC: 8.9 MG/DL (ref 8.6–10.5)
CHLORIDE SERPL-SCNC: 105 MMOL/L (ref 98–107)
CLARITY UR: CLEAR
CO2 SERPL-SCNC: 24.7 MMOL/L (ref 22–29)
COLOR UR: YELLOW
CREAT BLD-MCNC: 0.49 MG/DL (ref 0.57–1)
DEPRECATED RDW RBC AUTO: 41.3 FL (ref 37–54)
EOSINOPHIL # BLD AUTO: 0.06 10*3/MM3 (ref 0–0.4)
EOSINOPHIL NFR BLD AUTO: 0.7 % (ref 0.3–6.2)
ERYTHROCYTE [DISTWIDTH] IN BLOOD BY AUTOMATED COUNT: 13 % (ref 12.3–15.4)
GFR SERPL CREATININE-BSD FRML MDRD: >150 ML/MIN/1.73
GLOBULIN UR ELPH-MCNC: 2.9 GM/DL
GLUCOSE BLD-MCNC: 83 MG/DL (ref 65–99)
GLUCOSE UR STRIP-MCNC: NEGATIVE MG/DL
HCT VFR BLD AUTO: 31.8 % (ref 34–46.6)
HGB BLD-MCNC: 10.7 G/DL (ref 12–15.9)
HGB UR QL STRIP.AUTO: NEGATIVE
IMM GRANULOCYTES # BLD AUTO: 0.07 10*3/MM3 (ref 0–0.05)
IMM GRANULOCYTES NFR BLD AUTO: 0.8 % (ref 0–0.5)
KETONES UR QL STRIP: NEGATIVE
LEUKOCYTE ESTERASE UR QL STRIP.AUTO: NEGATIVE
LYMPHOCYTES # BLD AUTO: 2.11 10*3/MM3 (ref 0.7–3.1)
LYMPHOCYTES NFR BLD AUTO: 23.6 % (ref 19.6–45.3)
MCH RBC QN AUTO: 29.6 PG (ref 26.6–33)
MCHC RBC AUTO-ENTMCNC: 33.6 G/DL (ref 31.5–35.7)
MCV RBC AUTO: 88.1 FL (ref 79–97)
MONOCYTES # BLD AUTO: 0.83 10*3/MM3 (ref 0.1–0.9)
MONOCYTES NFR BLD AUTO: 9.3 % (ref 5–12)
NEUTROPHILS # BLD AUTO: 5.86 10*3/MM3 (ref 1.7–7)
NEUTROPHILS NFR BLD AUTO: 65.4 % (ref 42.7–76)
NITRITE UR QL STRIP: NEGATIVE
NRBC BLD AUTO-RTO: 0 /100 WBC (ref 0–0.2)
NT-PROBNP SERPL-MCNC: 31.2 PG/ML (ref 5–450)
PH UR STRIP.AUTO: 7 [PH] (ref 5–8)
PLATELET # BLD AUTO: 287 10*3/MM3 (ref 140–450)
PMV BLD AUTO: 9.9 FL (ref 6–12)
POTASSIUM BLD-SCNC: 4 MMOL/L (ref 3.5–5.2)
PROT SERPL-MCNC: 6.5 G/DL (ref 6–8.5)
PROT UR QL STRIP: NEGATIVE
RBC # BLD AUTO: 3.61 10*6/MM3 (ref 3.77–5.28)
SODIUM BLD-SCNC: 141 MMOL/L (ref 136–145)
SP GR UR STRIP: 1.03 (ref 1–1.03)
UROBILINOGEN UR QL STRIP: NORMAL
WBC NRBC COR # BLD: 8.95 10*3/MM3 (ref 3.4–10.8)

## 2020-03-27 PROCEDURE — 99282 EMERGENCY DEPT VISIT SF MDM: CPT

## 2020-03-27 PROCEDURE — 83880 ASSAY OF NATRIURETIC PEPTIDE: CPT | Performed by: OBSTETRICS & GYNECOLOGY

## 2020-03-27 PROCEDURE — 85025 COMPLETE CBC W/AUTO DIFF WBC: CPT | Performed by: OBSTETRICS & GYNECOLOGY

## 2020-03-27 PROCEDURE — 80053 COMPREHEN METABOLIC PANEL: CPT | Performed by: OBSTETRICS & GYNECOLOGY

## 2020-03-27 PROCEDURE — 81003 URINALYSIS AUTO W/O SCOPE: CPT | Performed by: OBSTETRICS & GYNECOLOGY

## 2020-03-27 RX ORDER — SODIUM CHLORIDE 0.9 % (FLUSH) 0.9 %
10 SYRINGE (ML) INJECTION AS NEEDED
Status: DISCONTINUED | OUTPATIENT
Start: 2020-03-27 | End: 2020-03-27 | Stop reason: HOSPADM

## 2020-03-27 RX ORDER — SODIUM CHLORIDE 0.9 % (FLUSH) 0.9 %
10 SYRINGE (ML) INJECTION EVERY 12 HOURS SCHEDULED
Status: DISCONTINUED | OUTPATIENT
Start: 2020-03-27 | End: 2020-03-27 | Stop reason: HOSPADM

## 2020-03-27 NOTE — ED PROVIDER NOTES
"KO Note [facility] [place]        Patient Name: Sandra Valadez  YOB: 1995  MRN: [id number]  Admission Date: 3/27/2020  1:07 PM  Date of Service: 3/27/2020    Subjective     Sandra Valadez is a 24 y.o. female  at 23w0d with Estimated Date of Delivery: 20 who presents with feeling shortness of breath over the last week which increased.   She currently sees Noemi Mittal MD for her prenatal care.  Her pregnancy has been complicated by:  Discovery of pulmonary embolism on 3/7/2020 when a CT of her chest demonstrated  \" appears to be several small pulmonary emboli in adjacent branches of the right main pulmonary artery\" which was performed secondary to complaint of chest pain and palpitations. Of note her O2 sat at that time was 100%.   She was evaluated and was found to have lupus anticoagulant and has been placed on Lovenox 80 mg q 12 hours.  She states that she has been feeling short of breath over the last week and it has become progressively worse.  She denies any cold or flu symptoms, no temperature, no cough, no nausea, and no vomiting.    Her pregnancy is also remarkable for prior C section x 3, GBS positive urine, Anemia, and HSV.   She does feel normal fetal movement.  She denies rupture of membranes.  She denies vaginal bleeding  She is not feeling contractions.          Objective   Patient Active Problem List    Diagnosis   • Request for sterilization [Z30.2]   • Adverse effect of iron and its compounds, initial encounter [T45.4X5A]   • Iron deficiency anemia during pregnancy [O99.019, D50.9]   • Acute pulmonary embolism without acute cor pulmonale (CMS/HCC) [I26.99]   • Maternal pulmonary embolus (PE), current pregnancy [O88.219]   • Group B Streptococcus urinary tract infection affecting pregnancy, antepartum [O23.40, B95.1]   • Dysplasia of cervix, low grade (JENNA 1) [N87.0]   • Tachycardia [R00.0]   • Urinary tract infection in mother during pregnancy, antepartum [O23.40]   • " History of  delivery [Z98.891]   • Elevated prolactin level (CMS/HCC) [E22.9]   • HSV (herpes simplex virus) anogenital infection [A60.9]        OB History    Para Term  AB Living   5 3 3 0 1 3   SAB TAB Ectopic Molar Multiple Live Births   1 0 0 0 0 3      # Outcome Date GA Lbr Alphonso/2nd Weight Sex Delivery Anes PTL Lv   5 Current            4 Term    3629 g (8 lb) F CS-LTranv   EVENS   3 Term    3544 g (7 lb 13 oz) F CS-LTranv  N EVENS   2 Term    4252 g (9 lb 6 oz) M CS-LTranv  N EVENS      Birth Comments: for suspected macrosomia    1 SAB  12w0d    SAB         Birth Comments: no D&C         Past Medical History:   Diagnosis Date   • Abnormal Pap smear of cervix    • Chlamydia     at age 14 ,   • H/O Depressive disorder    • Herpes     last outbreak    • History of anxiety    • History of urinary tract infection    • Pulmonary embolism (CMS/[facility]) 2020    during pregnancy       Past Surgical History:   Procedure Laterality Date   •  SECTION  , , 2015    x3        No current facility-administered medications on file prior to encounter.      Current Outpatient Medications on File Prior to Encounter   Medication Sig Dispense Refill   • enoxaparin (LOVENOX) 80 MG/0.8ML solution syringe Inject 0.8 mL under the skin into the appropriate area as directed every 12 (Twelve) hours for 30 days. Indications: DVT/PE (active thrombosis) 48 mL 2   • ferrous sulfate 325 (65 FE) MG EC tablet Take 1 tablet by mouth Daily With Breakfast. 30 tablet 3   • Prenatal Vit-Fe Fumarate-FA (PRENATAL, CLASSIC, VITAMIN) 28-0.8 MG tablet tablet Take 1 tablet by mouth Daily.         No Known Allergies    Family History   Problem Relation Age of Onset   • Diabetes Maternal Uncle    • Breast cancer Paternal Grandmother    • Breast cancer Paternal Aunt    • Breast cancer Paternal Aunt    • Thyroid disease Mother        Social History     Socioeconomic History   • Marital status: Single     Spouse  "name: Not on file   • Number of children: 3   • Years of education: Not on file   • Highest education level: Not on file   Occupational History   • Occupation: Nurse     Employer: [employer]   Tobacco Use   • Smoking status: Never Smoker   • Smokeless tobacco: Never Used   Substance and Sexual Activity   • Alcohol use: No   • Drug use: No   • Sexual activity: Yes     Partners: Male           Review of Systems   Constitutional: Negative for chills and fever.   HENT: Negative.    Eyes: Negative for photophobia and visual disturbance.   Respiratory: Positive for shortness of breath (progressive feeling of SOB over the last week). Negative for cough and chest tightness.    Cardiovascular: Negative for leg swelling.   Gastrointestinal: Negative for abdominal pain, diarrhea, nausea and vomiting.   Genitourinary: Negative for dysuria, flank pain, hematuria, pelvic pain, vaginal bleeding and vaginal discharge.   Musculoskeletal: Negative for back pain.   Neurological: Negative for dizziness, weakness and headaches.          PHYSICAL EXAM:      VITAL SIGNS:  Vitals:    03/27/20 1331 03/27/20 1338 03/27/20 1402   BP:   127/69   Pulse:  80 83   Resp: 18     Temp: 97.9 °F (36.6 °C)     SpO2: 100%  99%   Weight:  81.6 kg (180 lb)    Height:  152.4 cm (60\")         FHT'S:                    Fetal HR (beats/min): 147.        PHYSICAL EXAM:    General: well developed; well nourished  no acute distress   Heart: regular rate and rhythm, S1, S2 normal, no murmur, click, rub or gallop   Lungs:    Back: breathing is unlabored  clear to auscultation bilaterally  CVA tenderness is absent    Abdomen: soft, non-tender; no masses  no umbilical or inguinal hernias are present  no hepato-splenomegaly       Cervix: was not checked.      Contractions: none        Extremities: peripheral pulses normal, no pedal edema, no clubbing or cyanosis      LABS AND TESTING ORDERED:  1. Doppler fetal heart tones.  2. Urinalysis  3. CBC, " CMP, BNP  4. Pulse OX  5. Saline lock IV    LAB RESULTS:    Recent Results (from the past 24 hour(s))   Urinalysis With Microscopic If Indicated (No Culture) - Urine, Clean Catch    Collection Time: 03/27/20  1:56 PM   Result Value Ref Range    Color, UA Yellow Yellow, Straw    Appearance, UA Clear Clear    pH, UA 7.0 5.0 - 8.0    Specific Gravity, UA 1.026 1.005 - 1.030    Glucose, UA Negative Negative    Ketones, UA Negative Negative    Bilirubin, UA Negative Negative    Blood, UA Negative Negative    Protein, UA Negative Negative    Leuk Esterase, UA Negative Negative    Nitrite, UA Negative Negative    Urobilinogen, UA 1.0 E.U./dL 0.2 - 1.0 E.U./dL   Comprehensive Metabolic Panel    Collection Time: 03/27/20  2:06 PM   Result Value Ref Range    Glucose 83 65 - 99 mg/dL    BUN 10 6 - 20 mg/dL    Creatinine 0.49 (L) 0.57 - 1.00 mg/dL    Sodium 141 136 - 145 mmol/L    Potassium 4.0 3.5 - 5.2 mmol/L    Chloride 105 98 - 107 mmol/L    CO2 24.7 22.0 - 29.0 mmol/L    Calcium 8.9 8.6 - 10.5 mg/dL    Total Protein 6.5 6.0 - 8.5 g/dL    Albumin 3.60 3.50 - 5.20 g/dL    ALT (SGPT) 9 1 - 33 U/L    AST (SGOT) 11 1 - 32 U/L    Alkaline Phosphatase 51 39 - 117 U/L    Total Bilirubin <0.2 (L) 0.2 - 1.2 mg/dL    eGFR Non African Amer >150 >60 mL/min/1.73    Globulin 2.9 gm/dL    A/G Ratio 1.2 g/dL    BUN/Creatinine Ratio 20.4 7.0 - 25.0    Anion Gap 11.3 5.0 - 15.0 mmol/L   BNP    Collection Time: 03/27/20  2:06 PM   Result Value Ref Range    proBNP 31.2 5.0 - 450.0 pg/mL   CBC Auto Differential    Collection Time: 03/27/20  2:06 PM   Result Value Ref Range    WBC 8.95 3.40 - 10.80 10*3/mm3    RBC 3.61 (L) 3.77 - 5.28 10*6/mm3    Hemoglobin 10.7 (L) 12.0 - 15.9 g/dL    Hematocrit 31.8 (L) 34.0 - 46.6 %    MCV 88.1 79.0 - 97.0 fL    MCH 29.6 26.6 - 33.0 pg    MCHC 33.6 31.5 - 35.7 g/dL    RDW 13.0 12.3 - 15.4 %    RDW-SD 41.3 37.0 - 54.0 fl    MPV 9.9 6.0 - 12.0 fL    Platelets 287 140 - 450 10*3/mm3    Neutrophil % 65.4 42.7 -  "76.0 %    Lymphocyte % 23.6 19.6 - 45.3 %    Monocyte % 9.3 5.0 - 12.0 %    Eosinophil % 0.7 0.3 - 6.2 %    Basophil % 0.2 0.0 - 1.5 %    Immature Grans % 0.8 (H) 0.0 - 0.5 %    Neutrophils, Absolute 5.86 1.70 - 7.00 10*3/mm3    Lymphocytes, Absolute 2.11 0.70 - 3.10 10*3/mm3    Monocytes, Absolute 0.83 0.10 - 0.90 10*3/mm3    Eosinophils, Absolute 0.06 0.00 - 0.40 10*3/mm3    Basophils, Absolute 0.02 0.00 - 0.20 10*3/mm3    Immature Grans, Absolute 0.07 (H) 0.00 - 0.05 10*3/mm3    nRBC 0.0 0.0 - 0.2 /100 WBC       ABO Type   Date Value Ref Range Status   2020 O  Final     RH type   Date Value Ref Range Status   2020 Positive  Final               ASSESSMENT/PLAN:  Sandra Valadez is a 24 y.o. female  at 23w0d who presented with c/o progressive shortness of breath over the last week.  Her pregnancy has been complicated by:  Discovery of pulmonary embolism on 3/7/2020 when a CT of her chest demonstrated  \" appears to be several small pulmonary emboli in adjacent branches of the right main pulmonary artery\" which was performed secondary to complaint of chest pain and palpitations. Of note her O2 sat at that time was 100%.   She was evaluated and was found to have lupus anticoagulant and has been placed on Lovenox 80 mg q 12 hours.  She states that she has been feeling short of breath over the last week and it has become progressively worse.  She denies any cold or flu symptoms, no temperature, no cough, no nausea, and no vomiting.  Dr Zan Whitlock discussed her case with me prior to arrival and ask that I get a BNP to evaluate her fluid status.    She was place on a pulse/oximeter and was noted to have a pulse of 70 - 80 with O2 saturation of % thru out her stay in [place].   Her chest was clear to auscultation.  No lower leg edema or evidence of DVT.    Labs were:    UA: remarkable for sp gravity of 1.026, otherwise unremarkable  BNP: normal at 31.2  CBC: c/w mild anemia with HGB/HCT of 10.7/31.8 " with platelets of 287, normal WBC and no left shift  CMP: unremarkable    In view of a normal chest exam and normal pulse I did not order an EKG at this time.  I discussed the case with Dr Zan Whitlock who is covering and he recommended DC to home as her feeling of shortness of breath was not associated with decreased O2 sat and her labs were normal.  She has been instructed to return as needed for SOB or other concerns she may have.          Final Impression:  • Pregnancy at 23w0d  • Doppler FHT at 147 BPM  • Documented PE this pregnancy (3/7/2020) on therapeutic lovenox  80 mg sub cu q 12 hours.  Oxygen saturation of %. Labs and exam were unremarkable for cause of feeling of SOB, may be due to progression of pregnancy.  As she is anticoagulated with normal O2 sat repeat CT not indicated at this time.  • History of 3 prior C sections plans repea  • GBS positive urine   • Mild anemia  • She will be discharged to home    1.  She will continue her home meds       Your medication list      CONTINUE taking these medications      Instructions Last Dose Given Next Dose Due   enoxaparin 80 MG/0.8ML solution syringe  Commonly known as:  LOVENOX      Inject 0.8 mL under the skin into the appropriate area as directed every 12 (Twelve) hours for 30 days. Indications: DVT/PE (active thrombosis)       ferrous sulfate 325 (65 FE) MG EC tablet      Take 1 tablet by mouth Daily With Breakfast.       prenatal (CLASSIC) vitamin 28-0.8 MG tablet tablet      Take 1 tablet by mouth Daily.                2.    She will follow up with Noemi Mittal MD as scheduled. Dr ANSLEY Whitlock MD asked her to make an appt on Monday 3/30/20 this is Friday 3/27/20.  3.    She has been instructed to return for contractions, vaginal bleeding, Rupture of membranes, decreased fetal movement or other concern  4.     She may call the office or [place] with questions.        I have spent 35 minutes including face to face time with the patient, greater than  50% in discussion of the diagnosis (counseling) and/or coordination of care.         Shankar Costa MD  3/27/2020  16:04  OB Hospitalist  Phone:  [phone]

## 2020-03-28 ENCOUNTER — APPOINTMENT (OUTPATIENT)
Dept: ONCOLOGY | Facility: HOSPITAL | Age: 25
End: 2020-03-28

## 2020-03-30 ENCOUNTER — TELEPHONE (OUTPATIENT)
Dept: CARDIOLOGY | Facility: CLINIC | Age: 25
End: 2020-03-30

## 2020-03-30 ENCOUNTER — TELEPHONE (OUTPATIENT)
Dept: OBSTETRICS AND GYNECOLOGY | Age: 25
End: 2020-03-30

## 2020-03-30 NOTE — TELEPHONE ENCOUNTER
Pt called, stated that she went to the E.R for shortness of breath on Friday, the E.R doctor advised her to make an follow up appointment with , piror to that she had an office visit with  she was advised to make an appoitment three weeks out.    Pt wanted to know if she should make an appt this upcoming week or continue to follow up three weeks out? (she wanted to know if she should go with what the E.R advised or what you advised).    I asked the patent if she gary like to make the appt, she declined, pt wanted to wait to see what  would like to do.     Pt is aware that  is not in office today and she will respond at her earliest convince by tomorrow.        Call back [phone]

## 2020-03-30 NOTE — TELEPHONE ENCOUNTER
S/w patient offered telehealth visit. She declined. She does not want to see provider covering clinic either. She prefers to be called back to reschedule she will call with any issues.      AL

## 2020-03-30 NOTE — TELEPHONE ENCOUNTER
MD covering Friday advised pt to f/u today so would recommend she book appt with NP for follow-up today as advised. I

## 2020-03-30 NOTE — TELEPHONE ENCOUNTER
Called patient, advised pt to make an appt today, patient declined due to her not having a , I also offered her an appt with you tomorrow she said that she will give me a call back so she can find a .

## 2020-03-31 ENCOUNTER — ROUTINE PRENATAL (OUTPATIENT)
Dept: OBSTETRICS AND GYNECOLOGY | Age: 25
End: 2020-03-31

## 2020-03-31 VITALS — WEIGHT: 181.6 LBS | SYSTOLIC BLOOD PRESSURE: 90 MMHG | DIASTOLIC BLOOD PRESSURE: 52 MMHG | BODY MASS INDEX: 35.47 KG/M2

## 2020-03-31 DIAGNOSIS — Z30.2 REQUEST FOR STERILIZATION: ICD-10-CM

## 2020-03-31 DIAGNOSIS — Z3A.23 23 WEEKS GESTATION OF PREGNANCY: Primary | ICD-10-CM

## 2020-03-31 DIAGNOSIS — O88.219 MATERNAL PULMONARY EMBOLUS (PE), CURRENT PREGNANCY: ICD-10-CM

## 2020-03-31 DIAGNOSIS — Z98.891 HISTORY OF CESAREAN DELIVERY: ICD-10-CM

## 2020-03-31 PROCEDURE — 0502F SUBSEQUENT PRENATAL CARE: CPT | Performed by: OBSTETRICS & GYNECOLOGY

## 2020-03-31 PROCEDURE — 81002 URINALYSIS NONAUTO W/O SCOPE: CPT | Performed by: OBSTETRICS & GYNECOLOGY

## 2020-03-31 NOTE — PROGRESS NOTES
CC: follow-up from L&D visit  HPI: pt presents for f/u of recent hospital visit. She reports she has had episodes of more shortness of air off and on but denies any issues today. She denies fever/cough/soa at this time. She notes active fetal movement. She denies any vag bleeding, regular ctx or leaking fluid.   ROS:  Pulm: negative for soa/cough today  CV: negative for palpitations today  : negative for regular ctx, vag bleeding or leaking fluid  O: general: pt in no distress  Heart: regular rate and rhythm  Lungs: clear to auscultate bilaterally, normal respiratory effort  FH= 25 cm  FHT's 141  Ext: no edema, cords or tenderness  A/p: PE in pregnancy: pt will continue lovenox 80 mg SC twice daily; spoke with her hematologist regarding her abnormal labs and positive LAC. He has reviewed results and advised she continue current treatment. She was seen by [facility] and they currently recommend she consider remaining on this until within 24 hours of c/s rather than changing to heparin. Spoke with hematologist who reports this is a consideration. Will refer to anesthesia for consult in 3rd trimester for additional input. Patient advised to return to hospital for recurrent shortness of air or chest pain. Pt has been counseled she will need to hold lovenox should she have signs of labor. M advised growth u/s at 28 weeks and pt has been booked there    Hx c/s X 3: pt plans repeat c/s with BTI. Pt scheduled at 38 weeks due to need for anticoagulation. [facility] did not recommend delivery prior to this time.     Anemia: heme MD evaluated and is giving iron infusions    23 weeks: plan repeat one hour at next visit 3 weeks. Reviewed PTL warnings.     rtc 3 weeks

## 2020-04-01 ENCOUNTER — INFUSION (OUTPATIENT)
Dept: ONCOLOGY | Facility: HOSPITAL | Age: 25
End: 2020-04-01

## 2020-04-01 VITALS
WEIGHT: 182 LBS | SYSTOLIC BLOOD PRESSURE: 121 MMHG | OXYGEN SATURATION: 97 % | BODY MASS INDEX: 35.54 KG/M2 | TEMPERATURE: 98.1 F | HEART RATE: 83 BPM | DIASTOLIC BLOOD PRESSURE: 83 MMHG

## 2020-04-01 DIAGNOSIS — D50.9 IRON DEFICIENCY ANEMIA DURING PREGNANCY: ICD-10-CM

## 2020-04-01 DIAGNOSIS — O99.019 IRON DEFICIENCY ANEMIA DURING PREGNANCY: ICD-10-CM

## 2020-04-01 DIAGNOSIS — T45.4X5A ADVERSE EFFECT OF IRON AND ITS COMPOUNDS, INITIAL ENCOUNTER: Primary | ICD-10-CM

## 2020-04-01 PROCEDURE — 96366 THER/PROPH/DIAG IV INF ADDON: CPT

## 2020-04-01 PROCEDURE — 25010000002 IRON SUCROSE PER 1 MG: Performed by: INTERNAL MEDICINE

## 2020-04-01 PROCEDURE — 96365 THER/PROPH/DIAG IV INF INIT: CPT

## 2020-04-01 RX ORDER — FAMOTIDINE 20 MG/1
20 TABLET, FILM COATED ORAL ONCE
Status: CANCELLED | OUTPATIENT
Start: 2020-04-02

## 2020-04-01 RX ORDER — FAMOTIDINE 20 MG/1
20 TABLET, FILM COATED ORAL ONCE
Status: COMPLETED | OUTPATIENT
Start: 2020-04-01 | End: 2020-04-01

## 2020-04-01 RX ORDER — ACETAMINOPHEN 325 MG/1
650 TABLET ORAL ONCE
Status: CANCELLED | OUTPATIENT
Start: 2020-04-02

## 2020-04-01 RX ORDER — DIPHENHYDRAMINE HYDROCHLORIDE 50 MG/ML
25 INJECTION INTRAMUSCULAR; INTRAVENOUS ONCE
Status: CANCELLED | OUTPATIENT
Start: 2020-04-02

## 2020-04-01 RX ORDER — DIPHENHYDRAMINE HCL 25 MG
25 CAPSULE ORAL ONCE
Status: CANCELLED | OUTPATIENT
Start: 2020-04-02

## 2020-04-01 RX ORDER — ACETAMINOPHEN 325 MG/1
650 TABLET ORAL ONCE
Status: COMPLETED | OUTPATIENT
Start: 2020-04-01 | End: 2020-04-01

## 2020-04-01 RX ORDER — DIPHENHYDRAMINE HCL 25 MG
25 CAPSULE ORAL ONCE
Status: DISCONTINUED | OUTPATIENT
Start: 2020-04-01 | End: 2020-04-01 | Stop reason: HOSPADM

## 2020-04-01 RX ORDER — SODIUM CHLORIDE 9 MG/ML
250 INJECTION, SOLUTION INTRAVENOUS ONCE
Status: CANCELLED | OUTPATIENT
Start: 2020-04-02

## 2020-04-01 RX ORDER — SODIUM CHLORIDE 9 MG/ML
250 INJECTION, SOLUTION INTRAVENOUS ONCE
Status: COMPLETED | OUTPATIENT
Start: 2020-04-01 | End: 2020-04-01

## 2020-04-01 RX ADMIN — SODIUM CHLORIDE 300 MG: 900 INJECTION, SOLUTION INTRAVENOUS at 09:20

## 2020-04-01 RX ADMIN — SODIUM CHLORIDE 100 ML: 9 INJECTION, SOLUTION INTRAVENOUS at 08:52

## 2020-04-01 RX ADMIN — FAMOTIDINE 20 MG: 20 TABLET ORAL at 08:52

## 2020-04-01 RX ADMIN — ACETAMINOPHEN 650 MG: 325 TABLET ORAL at 08:52

## 2020-04-04 ENCOUNTER — APPOINTMENT (OUTPATIENT)
Dept: ONCOLOGY | Facility: HOSPITAL | Age: 25
End: 2020-04-04

## 2020-04-08 ENCOUNTER — INFUSION (OUTPATIENT)
Dept: ONCOLOGY | Facility: HOSPITAL | Age: 25
End: 2020-04-08

## 2020-04-08 VITALS
OXYGEN SATURATION: 97 % | DIASTOLIC BLOOD PRESSURE: 69 MMHG | WEIGHT: 186 LBS | TEMPERATURE: 98.7 F | SYSTOLIC BLOOD PRESSURE: 105 MMHG | HEART RATE: 74 BPM | BODY MASS INDEX: 36.33 KG/M2

## 2020-04-08 DIAGNOSIS — O99.019 IRON DEFICIENCY ANEMIA DURING PREGNANCY: ICD-10-CM

## 2020-04-08 DIAGNOSIS — T45.4X5A ADVERSE EFFECT OF IRON AND ITS COMPOUNDS, INITIAL ENCOUNTER: Primary | ICD-10-CM

## 2020-04-08 DIAGNOSIS — D50.9 IRON DEFICIENCY ANEMIA DURING PREGNANCY: ICD-10-CM

## 2020-04-08 PROCEDURE — 25010000002 IRON SUCROSE PER 1 MG: Performed by: INTERNAL MEDICINE

## 2020-04-08 PROCEDURE — 96365 THER/PROPH/DIAG IV INF INIT: CPT

## 2020-04-08 RX ORDER — DIPHENHYDRAMINE HCL 25 MG
25 CAPSULE ORAL ONCE
Status: CANCELLED | OUTPATIENT
Start: 2020-04-09

## 2020-04-08 RX ORDER — SODIUM CHLORIDE 9 MG/ML
250 INJECTION, SOLUTION INTRAVENOUS ONCE
Status: COMPLETED | OUTPATIENT
Start: 2020-04-08 | End: 2020-04-08

## 2020-04-08 RX ORDER — ACETAMINOPHEN 325 MG/1
650 TABLET ORAL ONCE
Status: COMPLETED | OUTPATIENT
Start: 2020-04-08 | End: 2020-04-08

## 2020-04-08 RX ORDER — ACETAMINOPHEN 325 MG/1
650 TABLET ORAL ONCE
Status: CANCELLED | OUTPATIENT
Start: 2020-04-09

## 2020-04-08 RX ORDER — FAMOTIDINE 20 MG/1
20 TABLET, FILM COATED ORAL ONCE
Status: CANCELLED | OUTPATIENT
Start: 2020-04-09

## 2020-04-08 RX ORDER — DIPHENHYDRAMINE HYDROCHLORIDE 50 MG/ML
25 INJECTION INTRAMUSCULAR; INTRAVENOUS ONCE
Status: CANCELLED | OUTPATIENT
Start: 2020-04-09

## 2020-04-08 RX ORDER — SODIUM CHLORIDE 9 MG/ML
250 INJECTION, SOLUTION INTRAVENOUS ONCE
Status: CANCELLED | OUTPATIENT
Start: 2020-04-09

## 2020-04-08 RX ORDER — FAMOTIDINE 20 MG/1
20 TABLET, FILM COATED ORAL ONCE
Status: COMPLETED | OUTPATIENT
Start: 2020-04-08 | End: 2020-04-08

## 2020-04-08 RX ADMIN — SODIUM CHLORIDE 100 ML: 9 INJECTION, SOLUTION INTRAVENOUS at 08:54

## 2020-04-08 RX ADMIN — FAMOTIDINE 20 MG: 20 TABLET ORAL at 08:54

## 2020-04-08 RX ADMIN — ACETAMINOPHEN 650 MG: 325 TABLET ORAL at 08:54

## 2020-04-08 RX ADMIN — SODIUM CHLORIDE 300 MG: 900 INJECTION, SOLUTION INTRAVENOUS at 08:59

## 2020-04-09 ENCOUNTER — TELEPHONE (OUTPATIENT)
Dept: OBSTETRICS AND GYNECOLOGY | Age: 25
End: 2020-04-09

## 2020-04-10 ENCOUNTER — OFFICE VISIT (OUTPATIENT)
Dept: CARDIOLOGY | Facility: CLINIC | Age: 25
End: 2020-04-10

## 2020-04-10 VITALS — BODY MASS INDEX: 35.53 KG/M2 | WEIGHT: 181 LBS | HEIGHT: 60 IN

## 2020-04-10 DIAGNOSIS — I26.99 ACUTE PULMONARY EMBOLISM WITHOUT ACUTE COR PULMONALE, UNSPECIFIED PULMONARY EMBOLISM TYPE (HCC): Primary | ICD-10-CM

## 2020-04-10 PROCEDURE — 99442 PR PHYS/QHP TELEPHONE EVALUATION 11-20 MIN: CPT | Performed by: INTERNAL MEDICINE

## 2020-04-10 NOTE — PROGRESS NOTES
[facility]      Patient Name: Sandra Valadez  :1995  Age: 24 y.o.  Encounter Provider:  Raphael Puckett Jr, MD      Chief Complaint:   Chief Complaint   Patient presents with   • Pulmonary Embolism     4 week hosp fu     Answers for HPI/ROS submitted by the patient on 2020   What is the primary reason for your visit?: Other  Please describe your symptoms.: Fast heart rate, sometimes up to 130+  while walking, makes me feel dizzy  Have you had these symptoms before?: No  How long have you been having these symptoms?: 1-4 weeks ago  Please list any medications you are currently taking for this condition.: Prenatal  Please describe any probable cause for these symptoms. : Walking at work or walking in grocery store sometimes makes this worse. 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 I try to drink more fluids to help when my hear rate goes uo but it doesn't seem to help    This patient has consented to a telehealth visit via telephone. The visit was scheduled as a telephone visit to comply with patient safety concerns in accordance with CDC recommendations.  All vitals recorded within this visit are reported by the patient.  I spent 20 minutes in total including but not limited to the 15 minutes spent in direct conversation with this patient.     HPI  Sandra Valadez is a 24 y.o. female no significant past medical history who presents for follow-up evaluation of palpitations and dizziness.      Last clinic visit: Patient is currently 18 weeks pregnant on her fourth pregnancy and has noted that she is experiencing increased frequency of dizziness and palpitations when up and walking around at work.  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  She does not notice the symptoms much when she is at home.  She denies any chest pain, rest dyspnea or syncope.  She does have some mild shortness of air when she lies directly on her back but if she lies on her  side there is no orthopnea.  She denies PND or edema.  She had 3 previous C-sections and those pregnancies were uncomplicated.  She is got no past history of eclampsia, preeclampsia or postpartum cardiac pathology.  She is a lifelong non-smoker who denies alcohol or illicit drug use.  No family history of premature coronary artery disease or sudden cardiac death.    She was admitted to the hospital in early March due to increasing shortness of air.  She was found to have pulmonary emboli and was started on Lovenox.  Holter monitor was performed prior to that hospitalization which showed some sinus tachycardia but no significant arrhythmias.  Echocardiogram was performed while she was in the hospital which showed normal left ventricular ejection fraction and no significant valvular heart disease and normal pulmonary pressures.  She notes that the palpitation and dizziness is improved since she is been off of work.  She is trying to increase her fluid intake and stay well-hydrated.  She notes some mild increase in shortness of air when she is laying down to go to sleep.  No chest pain or syncope.  No significant changes in lower extremity edema.  She is tolerating Lovenox shots without any bleeding complications.  She is currently being followed for iron infusions.  Blood pressure is well controlled at her infusion visits.  Family and social history reviewed and not pertinent to this clinic visit.    The following portions of the patient's history were reviewed and updated as appropriate: allergies, current medications, past family history, past medical history, past social history, past surgical history and problem list.      Review of Systems   Constitution: Positive for malaise/fatigue. Negative for chills and fever.   HENT: Negative for hoarse voice and sore throat.    Eyes: Negative for double vision and photophobia.   Cardiovascular: Positive for dyspnea on exertion, orthopnea and palpitations. Negative for  "chest pain, leg swelling, near-syncope, paroxysmal nocturnal dyspnea and syncope.   Respiratory: Positive for shortness of breath. Negative for cough and wheezing.    Skin: Negative for poor wound healing and rash.   Musculoskeletal: Negative for arthritis and joint swelling.   Gastrointestinal: Negative for bloating, abdominal pain, hematemesis and hematochezia.   Neurological: Positive for dizziness. Negative for focal weakness.   Psychiatric/Behavioral: Negative for depression and suicidal ideas.   All other systems reviewed and are negative.      OBJECTIVE:   Vital Signs  There were no vitals filed for this visit.  Estimated body mass index is 35.35 kg/m² as calculated from the following:    Height as of this encounter: 152.4 cm (60\").    Weight as of this encounter: 82.1 kg (181 lb).    Physical Exam   Patient does not have the means by which to check vital signs at home.    Procedures          ASSESSMENT:      Diagnosis Plan   1. Acute pulmonary embolism without acute cor pulmonale, unspecified pulmonary embolism type (CMS/[facility])           PLAN OF CARE:     1. Palpitations and dizziness -no arrhythmia on telemetry and symptoms are improved since she has been off from work.  Pulmonary embolism noted currently being treated with Lovenox.  She still has some mild pulmonary complaints these do not seem to be cardiac in nature.  I have encouraged her to continue adequate fluid intake.  She will continue monitoring blood pressure and heart rate and call with any changes in clinical status.  She is following with pulmonary for PE.  Case was discussed with her obstetrician at time of hospitalization.    Return to clinic 6 months         Discharge Medications           Accurate as of April 10, 2020  9:44 AM. If you have any questions, ask your nurse or doctor.               Continue These Medications      Instructions Start Date   enoxaparin 80 MG/0.8ML solution syringe  Commonly known as:  LOVENOX   80 mg, Subcutaneous, " Every 12 Hours Scheduled      ferrous sulfate 325 (65 FE) MG EC tablet   325 mg, Oral, Daily With Breakfast      prenatal (CLASSIC) vitamin 28-0.8 MG tablet tablet   1 tablet, Oral, Daily             Thank you for allowing me to participate in the care of your patient,      Sincerely,   Raphael Puckett MD  [facility]  04/10/20  09:44

## 2020-04-16 ENCOUNTER — ROUTINE PRENATAL (OUTPATIENT)
Dept: OBSTETRICS AND GYNECOLOGY | Age: 25
End: 2020-04-16

## 2020-04-16 ENCOUNTER — TELEPHONE (OUTPATIENT)
Dept: OBSTETRICS AND GYNECOLOGY | Age: 25
End: 2020-04-16

## 2020-04-16 VITALS — DIASTOLIC BLOOD PRESSURE: 60 MMHG | SYSTOLIC BLOOD PRESSURE: 100 MMHG | WEIGHT: 183.2 LBS | BODY MASS INDEX: 35.78 KG/M2

## 2020-04-16 DIAGNOSIS — Z13.0 SCREENING FOR IRON DEFICIENCY ANEMIA: Primary | ICD-10-CM

## 2020-04-16 DIAGNOSIS — Z98.891 HISTORY OF CESAREAN DELIVERY: ICD-10-CM

## 2020-04-16 DIAGNOSIS — O99.019 IRON DEFICIENCY ANEMIA DURING PREGNANCY: ICD-10-CM

## 2020-04-16 DIAGNOSIS — Z3A.25 25 WEEKS GESTATION OF PREGNANCY: ICD-10-CM

## 2020-04-16 DIAGNOSIS — D50.9 IRON DEFICIENCY ANEMIA DURING PREGNANCY: ICD-10-CM

## 2020-04-16 DIAGNOSIS — Z13.1 SCREENING FOR DIABETES MELLITUS: ICD-10-CM

## 2020-04-16 DIAGNOSIS — I26.99 ACUTE PULMONARY EMBOLISM WITHOUT ACUTE COR PULMONALE, UNSPECIFIED PULMONARY EMBOLISM TYPE (HCC): ICD-10-CM

## 2020-04-16 LAB
BASOPHILS # BLD AUTO: 0.02 10*3/MM3 (ref 0–0.2)
BASOPHILS NFR BLD AUTO: 0.2 % (ref 0–1.5)
CLARITY, POC: CLEAR
COLOR UR: YELLOW
EOSINOPHIL # BLD AUTO: 0.06 10*3/MM3 (ref 0–0.4)
EOSINOPHIL NFR BLD AUTO: 0.7 % (ref 0.3–6.2)
ERYTHROCYTE [DISTWIDTH] IN BLOOD BY AUTOMATED COUNT: 13.7 % (ref 12.3–15.4)
GLUCOSE 1H P 50 G GLC PO SERPL-MCNC: 89 MG/DL (ref 65–179)
GLUCOSE UR STRIP-MCNC: NEGATIVE MG/DL
HCT VFR BLD AUTO: 34.8 % (ref 34–46.6)
HGB BLD-MCNC: 11.6 G/DL (ref 12–15.9)
IMM GRANULOCYTES # BLD AUTO: 0.08 10*3/MM3 (ref 0–0.05)
IMM GRANULOCYTES NFR BLD AUTO: 1 % (ref 0–0.5)
LYMPHOCYTES # BLD AUTO: 1.78 10*3/MM3 (ref 0.7–3.1)
LYMPHOCYTES NFR BLD AUTO: 22.1 % (ref 19.6–45.3)
MCH RBC QN AUTO: 30 PG (ref 26.6–33)
MCHC RBC AUTO-ENTMCNC: 33.3 G/DL (ref 31.5–35.7)
MCV RBC AUTO: 89.9 FL (ref 79–97)
MONOCYTES # BLD AUTO: 0.58 10*3/MM3 (ref 0.1–0.9)
MONOCYTES NFR BLD AUTO: 7.2 % (ref 5–12)
NEUTROPHILS # BLD AUTO: 5.55 10*3/MM3 (ref 1.7–7)
NEUTROPHILS NFR BLD AUTO: 68.8 % (ref 42.7–76)
NRBC BLD AUTO-RTO: 0 /100 WBC (ref 0–0.2)
PLATELET # BLD AUTO: 255 10*3/MM3 (ref 140–450)
PROT UR STRIP-MCNC: NEGATIVE MG/DL
RBC # BLD AUTO: 3.87 10*6/MM3 (ref 3.77–5.28)
WBC # BLD AUTO: 8.07 10*3/MM3 (ref 3.4–10.8)

## 2020-04-16 PROCEDURE — 0502F SUBSEQUENT PRENATAL CARE: CPT | Performed by: OBSTETRICS & GYNECOLOGY

## 2020-04-16 PROCEDURE — 81002 URINALYSIS NONAUTO W/O SCOPE: CPT | Performed by: OBSTETRICS & GYNECOLOGY

## 2020-04-16 RX ORDER — ALBUTEROL SULFATE 90 UG/1
2 AEROSOL, METERED RESPIRATORY (INHALATION) EVERY 4 HOURS PRN
COMMUNITY
End: 2020-11-05

## 2020-04-16 NOTE — TELEPHONE ENCOUNTER
(humphrey pt) Pt called, wanted to know if we received papers for short-term disability claim? Pt stated they were faxed over today. Please advise

## 2020-04-16 NOTE — PROGRESS NOTES
Presents for routine f/u visit and glucose testing  She notes active fetal movement. She denies any regular ctx, vaginal bleeding or leaking fluid. She denies any recent shortness of air or chest pain. She notes the pulmonologist sent her an inhaler but she just started and has not noted a significant change in symptoms.   O: FH=27 cm, FHT's 143  Pulm: normal respiratory effort  Ext: no edema or cords  A/p: 25 weeks: reviewed PTL warnings, f/u 3 weeks, will call with one hour    PE: stable symptoms now. Pt continues lovenox 80 mg twice daily; she has appt with Heme MD early May    Anemia: pt s/p iron infusions and heme following    Hx palpitations: had cardiology evaluation. He will see her again in 6 months    Growth u/s planned by KAREN at 28 weeks, advised daily fmc's, rtc 3 weeks    Hx c/s X 3: plan repeat with tubal

## 2020-04-22 ENCOUNTER — TELEPHONE (OUTPATIENT)
Dept: OBSTETRICS AND GYNECOLOGY | Age: 25
End: 2020-04-22

## 2020-04-22 NOTE — TELEPHONE ENCOUNTER
"FYI ONLY - Pt called back, stated that she did receive the short term disabily paper work via e-mail, pt stated that the \"top page\" was not completed correctly, advised niesha.    niesha will overlook the documentation.   "

## 2020-05-04 ENCOUNTER — TRANSCRIBE ORDERS (OUTPATIENT)
Dept: ULTRASOUND IMAGING | Facility: HOSPITAL | Age: 25
End: 2020-05-04

## 2020-05-04 ENCOUNTER — HOSPITAL ENCOUNTER (OUTPATIENT)
Dept: ULTRASOUND IMAGING | Facility: HOSPITAL | Age: 25
Discharge: HOME | End: 2020-05-04
Admitting: OBSTETRICS & GYNECOLOGY

## 2020-05-04 DIAGNOSIS — O88.219 MATERNAL PULMONARY EMBOLUS (PE), CURRENT PREGNANCY: Primary | ICD-10-CM

## 2020-05-04 DIAGNOSIS — O88.219 MATERNAL PULMONARY EMBOLUS (PE), CURRENT PREGNANCY: ICD-10-CM

## 2020-05-04 PROCEDURE — 76816 OB US FOLLOW-UP PER FETUS: CPT

## 2020-05-11 ENCOUNTER — TELEPHONE (OUTPATIENT)
Dept: OBSTETRICS AND GYNECOLOGY | Age: 25
End: 2020-05-11

## 2020-05-11 NOTE — TELEPHONE ENCOUNTER
Call pt, advise she proceed to ER for evaluation immediately as she is at increased risk of intracranial bleeding.

## 2020-05-11 NOTE — TELEPHONE ENCOUNTER
Pt hit the top of her head today on her the door to her truck.  Pt has a headache only at this time.  Wants to know if there's anything special she needs to do since she is currently taking Lovenox.  Please advise.

## 2020-05-19 ENCOUNTER — HOSPITAL ENCOUNTER (EMERGENCY)
Facility: HOSPITAL | Age: 25
Discharge: HOME | End: 2020-05-19
Attending: OBSTETRICS & GYNECOLOGY | Admitting: OBSTETRICS & GYNECOLOGY

## 2020-05-19 ENCOUNTER — TELEPHONE (OUTPATIENT)
Dept: OBSTETRICS AND GYNECOLOGY | Age: 25
End: 2020-05-19

## 2020-05-19 VITALS
TEMPERATURE: 98.1 F | BODY MASS INDEX: 37.69 KG/M2 | DIASTOLIC BLOOD PRESSURE: 66 MMHG | HEIGHT: 60 IN | HEART RATE: 98 BPM | SYSTOLIC BLOOD PRESSURE: 135 MMHG | RESPIRATION RATE: 18 BRPM | WEIGHT: 192 LBS

## 2020-05-19 LAB
BACTERIA UR QL AUTO: ABNORMAL /HPF
BILIRUB UR QL STRIP: NEGATIVE
CLARITY UR: CLEAR
COLOR UR: YELLOW
GLUCOSE UR STRIP-MCNC: NEGATIVE MG/DL
HGB UR QL STRIP.AUTO: NEGATIVE
HYALINE CASTS UR QL AUTO: ABNORMAL /LPF
KETONES UR QL STRIP: ABNORMAL
LEUKOCYTE ESTERASE UR QL STRIP.AUTO: ABNORMAL
NITRITE UR QL STRIP: NEGATIVE
PH UR STRIP.AUTO: 7 [PH] (ref 5–8)
PROT UR QL STRIP: NEGATIVE
RBC # UR: ABNORMAL /HPF
REF LAB TEST METHOD: ABNORMAL
SP GR UR STRIP: 1.02 (ref 1–1.03)
SQUAMOUS #/AREA URNS HPF: ABNORMAL /HPF
UROBILINOGEN UR QL STRIP: ABNORMAL
WBC UR QL AUTO: ABNORMAL /HPF

## 2020-05-19 PROCEDURE — 99283 EMERGENCY DEPT VISIT LOW MDM: CPT

## 2020-05-19 PROCEDURE — 59025 FETAL NON-STRESS TEST: CPT

## 2020-05-19 PROCEDURE — 81001 URINALYSIS AUTO W/SCOPE: CPT | Performed by: OBSTETRICS & GYNECOLOGY

## 2020-05-19 NOTE — OBED NOTES
KO Note OB        Patient Name: Sandra Valadez  YOB: 1995  MRN: [id number]  Admission Date: 2020  5:33 PM  Date of Service: 2020    Chief Complaint: Back Pain (KO-- back pain that started this morning at 0400, has gotten worse as the day goes on, at its worst, it is a 4. Pain is lower midline of back. )        Subjective     Sandra Valadez is a 25 y.o. female  at 30w4d with Estimated Date of Delivery: 20 who presents with the chief complaint listed above.  She sees Noemi Mittal MD for her prenatal care. Her pregnancy has been complicated by:  Hx of PE(on levenox) CS x 3    She describes fetal movement as normal.  She denies rupture of membranes.  She denies vaginal bleeding. She is not feeling contractions.          Objective   Patient Active Problem List    Diagnosis   • Request for sterilization [Z30.2]   • Adverse effect of iron and its compounds, initial encounter [T45.4X5A]   • Iron deficiency anemia during pregnancy [O99.019, D50.9]   • Acute pulmonary embolism without acute cor pulmonale (CMS/HCC) [I26.99]   • Maternal pulmonary embolus (PE), current pregnancy [O88.219]   • Group B Streptococcus urinary tract infection affecting pregnancy, antepartum [O23.40, B95.1]   • Dysplasia of cervix, low grade (JENNA 1) [N87.0]   • Tachycardia [R00.0]   • Urinary tract infection in mother during pregnancy, antepartum [O23.40]   • History of  delivery [Z98.891]   • Elevated prolactin level (CMS/HCC) [E22.9]   • HSV (herpes simplex virus) anogenital infection [A60.9]        OB History    Para Term  AB Living   5 3 3 0 1 3   SAB TAB Ectopic Molar Multiple Live Births   1 0 0 0 0 3      # Outcome Date GA Lbr Alphonso/2nd Weight Sex Delivery Anes PTL Lv   5 Current            4 Term    3629 g (8 lb) F CS-LTranv   EVENS   3 Term    3544 g (7 lb 13 oz) F CS-LTranv  N EVENS   2 Term    4252 g (9 lb 6 oz) M CS-LTranv  N EVENS      Birth Comments: for  suspected macrosomia    1 SAB  12w0d    SAB         Birth Comments: no D&C         Past Medical History:   Diagnosis Date   • Abnormal Pap smear of cervix    • Chlamydia     at age 14 ,   • H/O Depressive disorder    • Herpes     last outbreak    • History of anxiety    • History of urinary tract infection    • Pulmonary embolism (CMS/HCC) 2020    during pregnancy       Past Surgical History:   Procedure Laterality Date   •  SECTION  , , 2015    x3        No current facility-administered medications on file prior to encounter.      Current Outpatient Medications on File Prior to Encounter   Medication Sig Dispense Refill   • enoxaparin (LOVENOX) 80 MG/0.8ML solution syringe Inject 80 mg under the skin into the appropriate area as directed Every 12 (Twelve) Hours.     • Prenatal Vit-Fe Fumarate-FA (PRENATAL, CLASSIC, VITAMIN) 28-0.8 MG tablet tablet Take 1 tablet by mouth Daily.     • ferrous sulfate 325 (65 FE) MG EC tablet Take 1 tablet by mouth Daily With Breakfast. 30 tablet 3       No Known Allergies    Family History   Problem Relation Age of Onset   • Diabetes Maternal Uncle    • Breast cancer Paternal Grandmother    • Breast cancer Paternal Aunt    • Breast cancer Paternal Aunt    • Thyroid disease Mother        Social History     Socioeconomic History   • Marital status: Single     Spouse name: Not on file   • Number of children: 3   • Years of education: Not on file   • Highest education level: Not on file   Occupational History   • Occupation: Nurse     Employer: [employer]   Tobacco Use   • Smoking status: Never Smoker   • Smokeless tobacco: Never Used   Substance and Sexual Activity   • Alcohol use: No   • Drug use: No   • Sexual activity: Yes     Partners: Male           Review of Systems   Constitutional: Negative for chills and fever.   HENT: Negative.    Eyes: Negative for photophobia and visual disturbance.   Respiratory: Negative for shortness of  "breath.    Cardiovascular: Negative for chest pain.   Gastrointestinal: Negative for nausea.   Musculoskeletal: Positive for back pain.   Psychiatric/Behavioral: The patient is not nervous/anxious.           PHYSICAL EXAM:      VITAL SIGNS:  Vitals:    05/19/20 1738 05/19/20 1807   BP:  135/66   BP Location:  Left arm   Patient Position:  Lying   Pulse:  98   Resp:  18   Temp:  98.1 °F (36.7 °C)   TempSrc:  Oral   Weight: 87.3 kg (192 lb 6.4 oz) 87.1 kg (192 lb)   Height:  152.4 cm (60\")        FHT'S:                   Baseline:  140s BPM  Variability:  Moderate = 6 - 25 BPM  Accelerations:  10 x 10 accelerations present     Decelerations:  absent  Contractions:   absent     Interpretation:   Reassuring for 30 weeks, occasional audible arrythmia        PHYSICAL EXAM:    General: no acute distress   Heart: Not performed.   Lungs  Back: breathing is unlabored  CVA tenderness is absent   Abdomen: fundus firm and non-tender       Cervix: By RN  Cervical Dilation (cm): 0  Cervical Effacement: 60-70%  Cervical Consistency: soft  Cervical Position: mid-position   Contractions: none        Extremities: pedal edema trace +      LABS AND TESTING ORDERED:  1. Uterine and fetal monitoring  2. Urinalysis    LAB RESULTS:    No results found for this or any previous visit (from the past 24 hour(s)).    Lab Results   Component Value Date    ABO O 03/07/2020    RH Positive 03/07/2020       No results found for: STREPGPB              Assessment/Plan  Low back pain, OB exam benign, probable muscle skeletal. Comfort measures discussed including heat, cold, massage, tylenol. Occasional audible fetal arrhythmia , continue to monitor in future visits  "

## 2020-05-19 NOTE — TELEPHONE ENCOUNTER
(Kyrie pt) Pt is 30 weeks pregnant and last night she started having back pain directly in the middle along with cramping in her lower abdominal area. Pt is afraid they may be contracts by how they are coming and going but she is unsure how often and how long they are lasting. Pt is feeling very good movement and denies leaking fluids. Please advise.     [phone]

## 2020-05-19 NOTE — TELEPHONE ENCOUNTER
Lm to notify patient advised to call me back and let me know she received it. Will try again here shortly.

## 2020-05-20 ENCOUNTER — TELEPHONE (OUTPATIENT)
Dept: OBSTETRICS AND GYNECOLOGY | Age: 25
End: 2020-05-20

## 2020-05-20 ENCOUNTER — DOCUMENTATION (OUTPATIENT)
Dept: OBSTETRICS AND GYNECOLOGY | Age: 25
End: 2020-05-20

## 2020-05-20 NOTE — PROGRESS NOTES
[facility] Discussed anti-coagulation options at term. She recommends changing pt to heparin at term rather than continuing lovenox. Pt has appt with hematologist so will contact him to advise appropriate dosing for heparin at pt's upcoming appt.

## 2020-05-20 NOTE — TELEPHONE ENCOUNTER
Advised pt she needs appt here asap. Offered today at 2:00 or 3:00. Also advise she go to L&D for anesthesia consult. Please call pt and schedule as directed.

## 2020-05-20 NOTE — TELEPHONE ENCOUNTER
Called pt back to schedule appointment.  Pt stated she will not be able to come in to be seen due to lack of .

## 2020-05-21 ENCOUNTER — TELEPHONE (OUTPATIENT)
Dept: OBSTETRICS AND GYNECOLOGY | Age: 25
End: 2020-05-21

## 2020-05-21 NOTE — TELEPHONE ENCOUNTER
Called pt to address her calls. No answer. Left message of attempted call. Of note, pt has failed to return to office as advised. She was offered appts yesterday and advised to return asap.

## 2020-05-21 NOTE — TELEPHONE ENCOUNTER
Pt is calling back because she received a letter stating she missed severeal appts and patient states that is not the case. Pt scheduled all her appt out a while ago and after being in the hospital for blood clots on her lungs Dr. Mittal advised any future appts needed to be with her, pt had to cancel any of her appts with Stephanie but she has never no showed any of them. Pt is scheduled for next Friday but if you need her to come in sooner she is willing too.

## 2020-05-21 NOTE — TELEPHONE ENCOUNTER
Pt called back and notes she received letter regarding missed appts. She notes she has not been able to come in for her recommended appts due to  issues. She notes she did get a sitter for an appt next week. Discussed need for regular OB appts particularly as she is a high risk pt.  Discussed need for anesthesia consult due to anticoagulation and advised she would need to go to L&D for consult. She reports she will try to get a sitter for this as well. She denies any issues today.     Reviewed pt's questions about covid testing. Advised that testing could be done prior to surgery when she has held her anticoagulant so her risk of bleeding would not be increased. Also reviewed my discussion with KAREN at [facility]. Dr. Escobedo recommends she change over to heparin at term. Advised pt she will need to be changed from lovenox to heparin by her hematologist and approx 35 weeks. Pt notes understanding.

## 2020-05-21 NOTE — TELEPHONE ENCOUNTER
(Kyrie pt) Pt is requesting a call from Dr. Mittal because she wants to discuss the possibilities of not having the COVID-19 testing. Pt already had a lot of nose bleeds and is on blood thinners due to this. Pt does not want the COVID-19 testing done through nasal cavities. Please advise.   [phone]

## 2020-05-26 ENCOUNTER — OFFICE VISIT (OUTPATIENT)
Dept: ONCOLOGY | Facility: CLINIC | Age: 25
End: 2020-05-26

## 2020-05-26 ENCOUNTER — LAB (OUTPATIENT)
Dept: LAB | Facility: HOSPITAL | Age: 25
End: 2020-05-26

## 2020-05-26 VITALS
OXYGEN SATURATION: 94 % | WEIGHT: 190.9 LBS | RESPIRATION RATE: 16 BRPM | TEMPERATURE: 97.8 F | HEIGHT: 60 IN | BODY MASS INDEX: 37.48 KG/M2 | SYSTOLIC BLOOD PRESSURE: 129 MMHG | HEART RATE: 96 BPM | DIASTOLIC BLOOD PRESSURE: 89 MMHG

## 2020-05-26 DIAGNOSIS — D50.9 IRON DEFICIENCY ANEMIA DURING PREGNANCY: ICD-10-CM

## 2020-05-26 DIAGNOSIS — D50.9 IRON DEFICIENCY ANEMIA DURING PREGNANCY: Primary | ICD-10-CM

## 2020-05-26 DIAGNOSIS — T45.4X5A ADVERSE EFFECT OF IRON AND ITS COMPOUNDS, INITIAL ENCOUNTER: ICD-10-CM

## 2020-05-26 DIAGNOSIS — O99.019 IRON DEFICIENCY ANEMIA DURING PREGNANCY: Primary | ICD-10-CM

## 2020-05-26 DIAGNOSIS — I26.99 ACUTE PULMONARY EMBOLISM WITHOUT ACUTE COR PULMONALE, UNSPECIFIED PULMONARY EMBOLISM TYPE (HCC): ICD-10-CM

## 2020-05-26 DIAGNOSIS — O99.019 IRON DEFICIENCY ANEMIA DURING PREGNANCY: ICD-10-CM

## 2020-05-26 PROBLEM — D68.62 LUPUS ANTICOAGULANT WITH HYPERCOAGULABLE STATE (HCC): Status: ACTIVE | Noted: 2020-05-26

## 2020-05-26 LAB
BASOPHILS # BLD AUTO: 0.02 10*3/MM3 (ref 0–0.2)
BASOPHILS NFR BLD AUTO: 0.2 % (ref 0–1.5)
DEPRECATED RDW RBC AUTO: 46.4 FL (ref 37–54)
EOSINOPHIL # BLD AUTO: 0.08 10*3/MM3 (ref 0–0.4)
EOSINOPHIL NFR BLD AUTO: 0.9 % (ref 0.3–6.2)
ERYTHROCYTE [DISTWIDTH] IN BLOOD BY AUTOMATED COUNT: 14 % (ref 12.3–15.4)
FERRITIN SERPL-MCNC: 33.1 NG/ML (ref 11–207)
HCT VFR BLD AUTO: 36.9 % (ref 34–46.6)
HGB BLD-MCNC: 12.2 G/DL (ref 12–15.9)
IMM GRANULOCYTES # BLD AUTO: 0.13 10*3/MM3 (ref 0–0.05)
IMM GRANULOCYTES NFR BLD AUTO: 1.4 % (ref 0–0.5)
IRON 24H UR-MRATE: 126 MCG/DL (ref 37–145)
IRON SATN MFR SERPL: 24 % (ref 14–48)
LYMPHOCYTES # BLD AUTO: 1.81 10*3/MM3 (ref 0.7–3.1)
LYMPHOCYTES NFR BLD AUTO: 19.3 % (ref 19.6–45.3)
MCH RBC QN AUTO: 29.9 PG (ref 26.6–33)
MCHC RBC AUTO-ENTMCNC: 33.1 G/DL (ref 31.5–35.7)
MCV RBC AUTO: 90.4 FL (ref 79–97)
MONOCYTES # BLD AUTO: 0.79 10*3/MM3 (ref 0.1–0.9)
MONOCYTES NFR BLD AUTO: 8.4 % (ref 5–12)
NEUTROPHILS # BLD AUTO: 6.55 10*3/MM3 (ref 1.7–7)
NEUTROPHILS NFR BLD AUTO: 69.8 % (ref 42.7–76)
NRBC BLD AUTO-RTO: 0 /100 WBC (ref 0–0.2)
PLATELET # BLD AUTO: 211 10*3/MM3 (ref 140–450)
PMV BLD AUTO: 9.7 FL (ref 6–12)
RBC # BLD AUTO: 4.08 10*6/MM3 (ref 3.77–5.28)
TIBC SERPL-MCNC: 528 MCG/DL (ref 249–505)
TRANSFERRIN SERPL-MCNC: 377 MG/DL (ref 200–360)
WBC NRBC COR # BLD: 9.38 10*3/MM3 (ref 3.4–10.8)

## 2020-05-26 PROCEDURE — 84466 ASSAY OF TRANSFERRIN: CPT

## 2020-05-26 PROCEDURE — 36415 COLL VENOUS BLD VENIPUNCTURE: CPT

## 2020-05-26 PROCEDURE — 83540 ASSAY OF IRON: CPT

## 2020-05-26 PROCEDURE — 85025 COMPLETE CBC W/AUTO DIFF WBC: CPT

## 2020-05-26 PROCEDURE — 82728 ASSAY OF FERRITIN: CPT

## 2020-05-26 PROCEDURE — 99214 OFFICE O/P EST MOD 30 MIN: CPT | Performed by: INTERNAL MEDICINE

## 2020-05-26 RX ORDER — ONDANSETRON 4 MG/1
TABLET, ORALLY DISINTEGRATING ORAL
COMMUNITY
Start: 2020-04-28

## 2020-05-26 NOTE — PROGRESS NOTES
.     REASON FOR FOLLOW-UP:     1. Pulmonary emboli involving the right main pulmonary artery during her pregnancy at 21 weeks.  Patient currently on Lovenox injection 80 mg q.12 h.  Patient is currently 31 weeks pregnant.  · Hypercoagulable workup without protein S or protein C profile on 3/16/2020 showed positive lupus anticoagulant and a marginally low Antithrombin III activity 82%..  2.  Anemia, unable to tolerate oral iron treatment once a day because of constipation.    · Patient received 3 doses of IV Venofer 300 mg each on 3/25/2020, 2020, and 2020.    HISTORY OF PRESENT ILLNESS:  The patient is a 25 y.o. female with the above-mentioned history who presents today for 2-month re-evaluation with laboratory review.    Patient received 3 doses of IV Venofer 300 mg each on 3/25/2020, 2020, and 2020 with good tolerance.  She reports that her energy level moderately improved with the IV iron therapy, and she no longer has pica for ice chips.    The patient presented to the [facility] ER on 3/27/2020 complaining of progressive shortness of breath over the prior week.  She was placed on a pulse/oximeter and was noted to have a pulse of 70-80 with O2 saturation of % during her stay in the ED.  Her chest exam was normal, and her laboratory studies showed hemoglobin 10.7.  She was discharged home under stable conditions and was advised to follow up with Dr. Mittal, her Ob/Gyn, for further evaluation.    On 3/20/2020, Dr. Hinds, at [facility], recommended changing the patient to heparin at approximately 35 weeks pregnancy, rather than continuing lovenox.    Patient is currently 31 weeks pregnant.  She continues to use Lovenox injections 80 mg q.12 h.  Patient reports bruising at the site of Lovenox injection, but she reports no spontaneous epistaxis or gum bleeding or any other bleeding problem.  She continues to follow up with Dr. Mittal, and is scheduled to have her  section done on  7/10/2020.  Patient has had 3 prior  sections done.    She is not taking oral iron at this time and was previously unable to tolerate oral iron treatment once a day due to constipation.    Patient currently has rapid pulse of 96 and her O2 saturation is 94% with /89.     Hypercoagulable workup, without protein S or protein C profile, on 3/16/2020 showed positive for lupus anticoagulant, however negative for anticardiolipin antibodies, anti-beta-2 glycoprotein 1 antibodies, and a negative for antiphospholipid antibody panel #2.  She had a marginal Antithrombin activity 82%, negative for factor II prothrombin mutation and negative for factor V Leiden mutation.     Also on 3/16/2020 patient was found to having severe iron deficiency with a ferritin 12.2, iron saturation 9% free iron 47 and TIBC 550.  Her hemoglobin was 11.3, MCV 87.7 platelets 240,000 and WBC 9300 with normal distribution.    Laboratory studies today, 2020, show normalized hemoglobin 12.2, and improved iron saturation 24%, ferritin 33.1, free iron 126 and a TIBC 528.  Maintains normal WBC 9380, and platelets 211,000.       Past Medical History:   Diagnosis Date   • Abnormal Pap smear of cervix    • Chlamydia     at age 14 ,   • H/O Depressive disorder    • Herpes     last outbreak    • History of anxiety    • History of urinary tract infection    • Pulmonary embolism (CMS/HCC) 2020    during pregnancy     Past Surgical History:   Procedure Laterality Date   •  SECTION  , , 2015    x3      ONCOLOGIC/HEMATOLOGIC HISTORY: (History from previous dates can be found in the separate document.)   The patient is a 25 y.o. female who presented for initial evaluation on 3/16/2020, referred by her GYN specialist, Dr. Mittal, because of newly diagnosed pulmonary emboli.     Patient reports she had episodes of flu back in 2020. She had lingering issues with some dyspnea. Patient also reported she had tachycardia associated  with her shortness of breath. She was seen by Cardiology service and thought related to her pregnancy.  She did have worsening problem and eventually had CT angiogram study on 03/07/2020 when she was evaluated in the emergency room and admitted to the hospital for worsening dyspnea. This study reported several small pulmonary emboli in the adjacent branches of the right main pulmonary artery. There were no lung lesions. The upper abdomen showed unremarkable liver, spleen and adrenal glands. Patient was started on Lovenox every 12 hours anticoagulation.     Patient reports bruising at the site of Lovenox injection. She currently uses 80 mg q.12 h. She reports no spontaneous epistaxis or gum bleeding or any other bleeding problem.     As of 3/16/2020, patient was 22 and 1/2 weeks pregnant.  She reports no previous history of thrombophilia.  She had previous 3 C-sections with successful pregnancies. She reports previous use of birth control pills for about 3 years without problem.  She also has no family history for thrombophilia.     Patient recently was started on oral iron treatment during her hospitalization in early 3/2020.  This patient had normal laboratory study, CBC on 11/22/2019 with hemoglobin 12.9, MCV 87 and platelets 394,000, WBC 6600.  Her labs on 02/03/2020 reported hemoglobin 12.1, MCV 88.8, platelets 294,000 and WBC 8550 including ANC 5630, lymphocytes 2170.  She had normal TSH 1.66 that day. On 03/07/2020, she had hemoglobin 11.7.  On 03/08/2020, her hemoglobin was 10.7 with MCV 88.7, MCHC 34.2.  Normal platelets and WBC.     Patient reports she was not able to tolerate oral iron once a day. She has not been taking iron for the past few days. She reports constipation secondary to oral iron treatment. Patient also reports previously she was given oral iron treatment during her 1st pregnancy and she did not tolerate it.     Laboratory study on 3/16/2020 reported hemoglobin 11.3, MCV 87.7, MCHC 34.5.  Platelets 248,000 and WBC 9300 including ANC 5920, lymphocytes 2360.  Iron saturation 9% on 3/16/2020, and clearly patient has iron deficiency.      Patient received IV Venofer 300 mg on 3/25/2020, 4/1/2020, and 4/8/2020.     MEDICATIONS    Current Outpatient Medications:   •  albuterol sulfate  (90 Base) MCG/ACT inhaler, Inhale 2 puffs Every 4 (Four) Hours As Needed for Wheezing., Disp: , Rfl:   •  enoxaparin (LOVENOX) 80 MG/0.8ML solution syringe, Inject 80 mg under the skin into the appropriate area as directed Every 12 (Twelve) Hours., Disp: , Rfl:   •  ondansetron ODT (ZOFRAN-ODT) 4 MG disintegrating tablet, ONE EVERY 8 HOURS AS NEEDED FOR NAUSEA, Disp: , Rfl:   •  Prenatal Vit-Fe Fumarate-FA (PRENATAL, CLASSIC, VITAMIN) 28-0.8 MG tablet tablet, Take 1 tablet by mouth Daily., Disp: , Rfl:     ALLERGIES:   No Known Allergies    SOCIAL HISTORY:       Social History     Socioeconomic History   • Marital status: Single     Spouse name: Not on file   • Number of children: 3   • Years of education: Not on file   • Highest education level: Not on file   Occupational History   • Occupation: Nurse     Employer: [employer]   Tobacco Use   • Smoking status: Never Smoker   • Smokeless tobacco: Never Used   Substance and Sexual Activity   • Alcohol use: No   • Drug use: No   • Sexual activity: Yes     Partners: Male   · Patient is a rare social drinker.        FAMILY HISTORY:  Family History   Problem Relation Age of Onset   • Diabetes Maternal Uncle    • Breast cancer Paternal Grandmother    • Breast cancer Paternal Aunt    • Breast cancer Paternal Aunt    • Thyroid disease Mother    · No hematology disorder in the family, including thrombophilia.    REVIEW OF SYSTEMS:  Review of Systems   Constitutional: Negative for appetite change, fatigue and unexpected weight change.        Resolution of pica for ice chips   HENT: Negative for congestion, mouth sores, nosebleeds and sore throat.         No  "gum bleeding   Eyes: Negative for photophobia and visual disturbance.   Respiratory: Positive for shortness of breath (Mild exertional dyspnea).    Cardiovascular: Negative for chest pain, palpitations and leg swelling.   Gastrointestinal: Negative for abdominal pain, blood in stool and nausea.   Endocrine: Negative for cold intolerance and polydipsia.   Genitourinary: Negative for dysuria and hematuria.   Musculoskeletal: Negative for back pain and joint swelling.   Skin: Negative for color change and rash.   Allergic/Immunologic: Positive for immunocompromised state (Patient is 22 weeks pregnant on 3/16/2020). Negative for environmental allergies.   Neurological: Negative for dizziness, syncope, numbness and headaches.   Hematological: Negative for adenopathy. Bruises/bleeds easily (At the site of Lovenox injection).   Psychiatric/Behavioral: Negative for agitation and confusion.              Vitals:    05/26/20 1347   BP: 129/89   Pulse: 96   Resp: 16   Temp: 97.8 °F (36.6 °C)   SpO2: 94%   Weight: 86.6 kg (190 lb 14.4 oz)   Height: 152 cm (59.84\")   PainSc: 0-No pain     Current Status 5/26/2020   ECOG score 0       PHYSICAL EXAM:    CONSTITUTIONAL:  Vital signs reviewed.  Well-developed well-nourished pregnant female, no distress, looks comfortable.  EYES:  Conjunctiva and lids unremarkable.  Pupils equal size and round.  EARS,NOSE,MOUTH,THROAT:  Ears and nose appear unremarkable.  Oral mucosa moist.  Lips appear unremarkable.  RESPIRATORY:  Normal respiratory effort.  Lungs clear to auscultation bilaterally.  CARDIOVASCULAR: Regular rhythm and rate.  Normal S1, S2.  No murmurs rubs or gallops.  No significant lower extremity edema.  GASTROINTESTINAL: Patient is pregnant.  Limited examination.  Bowel sounds present.    LYMPHATIC:  No cervical, supraclavicular   lymphadenopathy.  MUSCULOSKELETAL:  Unremarkable gait and station.  Unremarkable digits/nails.  No cyanosis or clubbing.  SKIN:  Warm.  No " ricki.  PSYCHIATRIC:  Normal judgment and insight.  Normal mood and affect.    RECENT LABS:  Lab Results   Component Value Date    WBC 9.38 05/26/2020    HGB 12.2 05/26/2020    HCT 36.9 05/26/2020    MCV 90.4 05/26/2020     05/26/2020     Lab Results   Component Value Date    NEUTROABS 6.55 05/26/2020     Lab Results   Component Value Date    IRON 126 05/26/2020    TIBC 528 (H) 05/26/2020    FERRITIN 33.10 05/26/2020   Iron saturation 24% on 5/26/2020      Assessment/Plan      ASSESSMENT:   1. Pulmonary emboli involving the right main pulmonary artery during her pregnancy at 21 weeks, secondary hypercoagulable status.  I agree for the patient to continue Lovenox injection 80 mg q.12 h.   · Nevertheless, I recommended to have laboratory studies for hypercoagulable status for antiphospholipid syndrome and also we will check both factor II and factor V Leiden gene mutations together with antithrombin III. I advised against protein C and protein S study since she is pregnant and those levels would be artificially low. I recommended to test those in the future, 2 months after her delivery.   · I discussed with the patient on 3/16/2020 for the length of treatment, since she had a small pulmonary emboli, if her laboratory study was benign then she could be treated for 6 months and then stop.  If she has positive antiphospholipid antibodies then this needs to be reevaluated after 6 months anticoagulation.   · Hypercoagulable workup without protein S or protein C profile on 3/16/2020 showed positive for lupus anticoagulant, but a negative for antiphospholipid antibodies including panel #2.  I discussed with the patient today that lupus anticoagulant could not be related to her pregnancy, this will need to be repeated in the future after childbirth.  If she has persistent lupus anticoagulant, she will likely require life-long anticoagulation due to her hypercoagulable status.  However, these positive results may be  due to her current pregnancy, and it may disappear after childbirth, so these studies should be repeated in the future when she is not pregnant.  · Patient is currently 31 weeks pregnant.  She continues to follow up with her Ob/Gyn, Dr. Mittal, and is scheduled to have a  section done on 7/10/2020.  · Patient continues on Lovenox injection 80 mg q.12 h.  Dr. Hinds, at [facility], recently recommended changing the patient to heparin at approximately 35 weeks pregnancy, rather than continuing lovenox.  I discussed with the patient today that I agree with Dr. Hinds, and we will plan to switch her from Lovenox injections to Heparin twice a day when she is 35 weeks pregnant, which is 4 weeks from now.    2.  Anemia, unable to tolerate oral iron treatment once a day because of constipation. I looked at her laboratory results.  There was no documented iron study.  I recommended to obtain a baseline on 3/16/2020.  · Laboratory studies on 3/16/2020 showed iron saturation 9%, and clearly patient has iron deficiency.  I recommended intravenous iron therapy with Venofer 300 mg weekly for 3 doses and patient is agreeable.   · Patient received 3 doses of IV Venofer 300 mg each on 3/25/2020, 2020, and 2020.  Patient reports resolution of pica for ice chips.  · Laboratory studies today, 2020, showed normalized hemoglobin 12.2, with normalized iron saturation and slightly improved ferritin level.  Highly suspect she will become iron deficiency again.      PLAN:   1. Continue Lovenox injection 80 mg q.12 h. for now.  2. Continue follow up with her Ob-Gyn, Dr. Mittal.    3. We will arrange patient to come back for reevaluation in 4 weeks for re-evaluation.  She will be approximately 35 weeks pregnant at that time, and we will plan switch her from Lovenox injection 80 mg q.12 h. to Heparin twice a day.  We will obtain a repeat CBC, stat iron and ferritin level.  4. Scheduled to have a  section done on  7/10/2020.    By signing my name here, I Zack Bentley, attest that all documentation on 05/26/20 at 15:25 has been prepared under the direction and in the presence of Dr. Ana Ho MD.    I reviewed the note scribed by Zack Bentley and made appropriate corrections.     ANA HO M.D., Ph.D.        CC:  Noemi Mittal MD

## 2020-05-27 ENCOUNTER — HOSPITAL ENCOUNTER (OUTPATIENT)
Dept: ULTRASOUND IMAGING | Facility: HOSPITAL | Age: 25
Discharge: HOME | End: 2020-05-27
Admitting: OBSTETRICS & GYNECOLOGY

## 2020-05-27 ENCOUNTER — TRANSCRIBE ORDERS (OUTPATIENT)
Dept: OBSTETRICS AND GYNECOLOGY | Facility: CLINIC | Age: 25
End: 2020-05-27

## 2020-05-27 DIAGNOSIS — O88.219 MATERNAL PULMONARY EMBOLUS (PE), CURRENT PREGNANCY: ICD-10-CM

## 2020-05-27 DIAGNOSIS — O36.8390 FETAL ARRHYTHMIA AFFECTING PREGNANCY, ANTEPARTUM: Primary | ICD-10-CM

## 2020-05-27 PROCEDURE — 76816 OB US FOLLOW-UP PER FETUS: CPT

## 2020-05-29 ENCOUNTER — ROUTINE PRENATAL (OUTPATIENT)
Dept: OBSTETRICS AND GYNECOLOGY | Age: 25
End: 2020-05-29

## 2020-05-29 ENCOUNTER — PROCEDURE VISIT (OUTPATIENT)
Dept: OBSTETRICS AND GYNECOLOGY | Age: 25
End: 2020-05-29

## 2020-05-29 VITALS — BODY MASS INDEX: 37.34 KG/M2 | DIASTOLIC BLOOD PRESSURE: 60 MMHG | SYSTOLIC BLOOD PRESSURE: 102 MMHG | WEIGHT: 190.2 LBS

## 2020-05-29 DIAGNOSIS — O36.8390 IRREGULAR FETAL HEART RATE AFFECTING MANAGEMENT OF MOTHER: ICD-10-CM

## 2020-05-29 DIAGNOSIS — Z3A.32 32 WEEKS GESTATION OF PREGNANCY: Primary | ICD-10-CM

## 2020-05-29 DIAGNOSIS — O88.219 MATERNAL PULMONARY EMBOLUS (PE), CURRENT PREGNANCY: Primary | ICD-10-CM

## 2020-05-29 PROCEDURE — 76819 FETAL BIOPHYS PROFIL W/O NST: CPT | Performed by: OBSTETRICS & GYNECOLOGY

## 2020-05-29 PROCEDURE — 0502F SUBSEQUENT PRENATAL CARE: CPT | Performed by: OBSTETRICS & GYNECOLOGY

## 2020-06-01 ENCOUNTER — APPOINTMENT (OUTPATIENT)
Dept: ULTRASOUND IMAGING | Facility: HOSPITAL | Age: 25
End: 2020-06-01

## 2020-06-02 ENCOUNTER — HOSPITAL ENCOUNTER (OUTPATIENT)
Dept: ULTRASOUND IMAGING | Facility: HOSPITAL | Age: 25
Discharge: HOME | End: 2020-06-02
Admitting: OBSTETRICS & GYNECOLOGY

## 2020-06-02 DIAGNOSIS — O36.8390 FETAL ARRHYTHMIA AFFECTING PREGNANCY, ANTEPARTUM: ICD-10-CM

## 2020-06-02 PROCEDURE — 76819 FETAL BIOPHYS PROFIL W/O NST: CPT | Performed by: OBSTETRICS & GYNECOLOGY

## 2020-06-02 PROCEDURE — 76825 ECHO EXAM OF FETAL HEART: CPT

## 2020-06-02 PROCEDURE — 93325 DOPPLER ECHO COLOR FLOW MAPG: CPT

## 2020-06-02 PROCEDURE — 76825 ECHO EXAM OF FETAL HEART: CPT | Performed by: OBSTETRICS & GYNECOLOGY

## 2020-06-02 PROCEDURE — 76819 FETAL BIOPHYS PROFIL W/O NST: CPT

## 2020-06-02 PROCEDURE — 93325 DOPPLER ECHO COLOR FLOW MAPG: CPT | Performed by: OBSTETRICS & GYNECOLOGY

## 2020-06-05 ENCOUNTER — TELEPHONE (OUTPATIENT)
Dept: OBSTETRICS AND GYNECOLOGY | Age: 25
End: 2020-06-05

## 2020-06-05 NOTE — TELEPHONE ENCOUNTER
Called pt and reviewed her recent fetal echo results. She notes it was read remotely and MFM was unable to discuss at the time.     Pt cancelled her visit last week because BPP was done with MFM. She has not booked weekly BPP for upcoming week. Advised pt she will be due by Tuesday , 6/9. Pt notes understanding and reports she will book. Sent message to MA advising she confirm pt has appt

## 2020-06-10 ENCOUNTER — ROUTINE PRENATAL (OUTPATIENT)
Dept: OBSTETRICS AND GYNECOLOGY | Age: 25
End: 2020-06-10

## 2020-06-10 ENCOUNTER — PROCEDURE VISIT (OUTPATIENT)
Dept: OBSTETRICS AND GYNECOLOGY | Age: 25
End: 2020-06-10

## 2020-06-10 VITALS — SYSTOLIC BLOOD PRESSURE: 112 MMHG | DIASTOLIC BLOOD PRESSURE: 68 MMHG | WEIGHT: 191 LBS | BODY MASS INDEX: 37.5 KG/M2

## 2020-06-10 DIAGNOSIS — O23.40 GROUP B STREPTOCOCCUS URINARY TRACT INFECTION AFFECTING PREGNANCY, ANTEPARTUM: ICD-10-CM

## 2020-06-10 DIAGNOSIS — O88.219 MATERNAL PULMONARY EMBOLUS (PE), CURRENT PREGNANCY: Primary | ICD-10-CM

## 2020-06-10 DIAGNOSIS — Z98.891 HISTORY OF CESAREAN DELIVERY: ICD-10-CM

## 2020-06-10 DIAGNOSIS — O99.019 IRON DEFICIENCY ANEMIA DURING PREGNANCY: ICD-10-CM

## 2020-06-10 DIAGNOSIS — A60.9 HSV (HERPES SIMPLEX VIRUS) ANOGENITAL INFECTION: ICD-10-CM

## 2020-06-10 DIAGNOSIS — D50.9 IRON DEFICIENCY ANEMIA DURING PREGNANCY: ICD-10-CM

## 2020-06-10 DIAGNOSIS — Z3A.33 33 WEEKS GESTATION OF PREGNANCY: Primary | ICD-10-CM

## 2020-06-10 DIAGNOSIS — B95.1 GROUP B STREPTOCOCCUS URINARY TRACT INFECTION AFFECTING PREGNANCY, ANTEPARTUM: ICD-10-CM

## 2020-06-10 PROCEDURE — 0502F SUBSEQUENT PRENATAL CARE: CPT | Performed by: PHYSICIAN ASSISTANT

## 2020-06-10 PROCEDURE — 76819 FETAL BIOPHYS PROFIL W/O NST: CPT | Performed by: OBSTETRICS & GYNECOLOGY

## 2020-06-10 NOTE — TELEPHONE ENCOUNTER
The following was scheduled:    6/10  U/S @ 11  Visit @ 11:15    6/16  U/S @ 10:30  Visit @ 10:45

## 2020-06-10 NOTE — PROGRESS NOTES
Chief Complaint   Patient presents with   • Routine Prenatal Visit       HPI: 25 y.o.  at 33w5d gestation  She is here for BPP  Has h/o PE, on blood thinners  Also seeing MFM for irregular fetal rhythm   Voided but didn't capture sample    There were no vitals filed for this visit.    ROS:  GI:  Negative  : NA  Pulmonary: Negative     A/P  1. Intrauterine pregnancy at 33w5d   2. Pregnancy Risk:  HIGH RISK    There are no diagnoses linked to this encounter.    -----------------------  PLAN:   No follow-ups on file.      DIPESH Maurer  6/10/2020 11:38

## 2020-06-16 ENCOUNTER — ROUTINE PRENATAL (OUTPATIENT)
Dept: OBSTETRICS AND GYNECOLOGY | Age: 25
End: 2020-06-16

## 2020-06-16 ENCOUNTER — PROCEDURE VISIT (OUTPATIENT)
Dept: OBSTETRICS AND GYNECOLOGY | Age: 25
End: 2020-06-16

## 2020-06-16 ENCOUNTER — TELEPHONE (OUTPATIENT)
Dept: OBSTETRICS AND GYNECOLOGY | Age: 25
End: 2020-06-16

## 2020-06-16 VITALS — BODY MASS INDEX: 37.73 KG/M2 | WEIGHT: 192.2 LBS | SYSTOLIC BLOOD PRESSURE: 100 MMHG | DIASTOLIC BLOOD PRESSURE: 60 MMHG

## 2020-06-16 DIAGNOSIS — Z30.2 REQUEST FOR STERILIZATION: ICD-10-CM

## 2020-06-16 DIAGNOSIS — I26.99 ACUTE PULMONARY EMBOLISM WITHOUT ACUTE COR PULMONALE, UNSPECIFIED PULMONARY EMBOLISM TYPE (HCC): ICD-10-CM

## 2020-06-16 DIAGNOSIS — D50.9 IRON DEFICIENCY ANEMIA DURING PREGNANCY: ICD-10-CM

## 2020-06-16 DIAGNOSIS — O88.219 MATERNAL PULMONARY EMBOLUS (PE), CURRENT PREGNANCY: ICD-10-CM

## 2020-06-16 DIAGNOSIS — Z98.891 HISTORY OF CESAREAN DELIVERY: ICD-10-CM

## 2020-06-16 DIAGNOSIS — O88.219 MATERNAL PULMONARY EMBOLUS (PE), CURRENT PREGNANCY: Primary | ICD-10-CM

## 2020-06-16 DIAGNOSIS — O99.019 IRON DEFICIENCY ANEMIA DURING PREGNANCY: ICD-10-CM

## 2020-06-16 DIAGNOSIS — Z3A.34 34 WEEKS GESTATION OF PREGNANCY: Primary | ICD-10-CM

## 2020-06-16 PROCEDURE — 0502F SUBSEQUENT PRENATAL CARE: CPT | Performed by: OBSTETRICS & GYNECOLOGY

## 2020-06-16 PROCEDURE — 90715 TDAP VACCINE 7 YRS/> IM: CPT | Performed by: OBSTETRICS & GYNECOLOGY

## 2020-06-16 PROCEDURE — 76819 FETAL BIOPHYS PROFIL W/O NST: CPT | Performed by: OBSTETRICS & GYNECOLOGY

## 2020-06-16 PROCEDURE — 90471 IMMUNIZATION ADMIN: CPT | Performed by: OBSTETRICS & GYNECOLOGY

## 2020-06-16 NOTE — TELEPHONE ENCOUNTER
Growth US @ [facility] w/ Dr. Escobedo scheduled for Wed 6/24.      Pt asked if she still needs to see you next week (she did not schedule at checkout today).  I told her I would check and let her know.  Can she have the BPP at her [facility] appt, and see you Tues or Friday?

## 2020-06-16 NOTE — TELEPHONE ENCOUNTER
Pt can have BPP at [facility] and see me for MD check only. She still needs visit with me next week.

## 2020-06-16 NOTE — TELEPHONE ENCOUNTER
LVM for pt letting her know she can do BPP @ [facility] on Wed 6/24, but she does need to see Dr. Mittal next week as well.    S/W Naz @ [facility] and asked her to make a note that pt needs BPP in addition to the Growth.

## 2020-06-16 NOTE — PROGRESS NOTES
CC: ob visit  HPI: pt presents for u/s and visit. She denies any issues today. She notes active fetal movement. She denies shortness of air. She denies regular ctx, vag bleeding or leaking fluid.   O: u/s: BPP 8/8, antonella normal  Doppler of FHT's over 2 min and regular fetal heart rate noted  A/p: hx PE during preg: pt is on lovenox 80 mg SC twice daily. She will see her hematologist next week to convert to heparin therapy. She is advised to hold anticoagulant dose with labor symptoms. Continue weekly BPP's and growth u/s. She had consult with anesthesia. Plan f/u u/s and consult with MFM next week for any further recommendations    Hx c/s X 3: pt plans repeat c/s with BTI. She is scheduled at 38 weeks    Fetal arrhythmia: PAC's noted and normal echo, no irregularity noted today with prolonged doppler    Hx herpes: pt denies any symptoms in years and declines prophylaxis at term    rtc 1 week

## 2020-06-22 ENCOUNTER — TELEPHONE (OUTPATIENT)
Dept: ONCOLOGY | Facility: CLINIC | Age: 25
End: 2020-06-22

## 2020-06-22 ENCOUNTER — TELEPHONE (OUTPATIENT)
Dept: OBSTETRICS AND GYNECOLOGY | Age: 25
End: 2020-06-22

## 2020-06-22 NOTE — TELEPHONE ENCOUNTER
Patient wants to make a follow up appointment with you but before she scheduled she wanted to know if she would be able to bring her 4 year old child with her due to her not having any . Please advise

## 2020-06-22 NOTE — TELEPHONE ENCOUNTER
Please refer to  for current office and [employer] policy regarding children attending visit. I must defer to the standard policy on this matter. Please advise pt that she is due for weekly visit and BPP due to high risk pregnancy.

## 2020-06-22 NOTE — TELEPHONE ENCOUNTER
Left second message for pt to call office - she needs to schedule OB Check w/ Dr. Mittal this week.  Scheduled for US (bpp and growth) @ [facility] Wednesday.

## 2020-06-22 NOTE — TELEPHONE ENCOUNTER
Patient calling to get ok to bring her child with her to MD mckeon. Ok given, patient has no other option and needs to see Md.

## 2020-06-22 NOTE — TELEPHONE ENCOUNTER
Patient called can she bring her child with her to her appointment tomorrow 6-23-20, she does not have  and she really wants to make her appointment.  Please call to advise [phone]

## 2020-06-23 ENCOUNTER — LAB (OUTPATIENT)
Dept: LAB | Facility: HOSPITAL | Age: 25
End: 2020-06-23

## 2020-06-23 ENCOUNTER — TELEPHONE (OUTPATIENT)
Dept: ONCOLOGY | Facility: CLINIC | Age: 25
End: 2020-06-23

## 2020-06-23 ENCOUNTER — OFFICE VISIT (OUTPATIENT)
Dept: ONCOLOGY | Facility: CLINIC | Age: 25
End: 2020-06-23

## 2020-06-23 ENCOUNTER — ROUTINE PRENATAL (OUTPATIENT)
Dept: OBSTETRICS AND GYNECOLOGY | Age: 25
End: 2020-06-23

## 2020-06-23 VITALS — DIASTOLIC BLOOD PRESSURE: 80 MMHG | BODY MASS INDEX: 37.89 KG/M2 | SYSTOLIC BLOOD PRESSURE: 120 MMHG | WEIGHT: 193 LBS

## 2020-06-23 VITALS
HEART RATE: 108 BPM | RESPIRATION RATE: 21 BRPM | DIASTOLIC BLOOD PRESSURE: 73 MMHG | SYSTOLIC BLOOD PRESSURE: 105 MMHG | TEMPERATURE: 97.9 F | WEIGHT: 193.8 LBS | BODY MASS INDEX: 38.05 KG/M2 | OXYGEN SATURATION: 98 %

## 2020-06-23 DIAGNOSIS — O88.219 MATERNAL PULMONARY EMBOLUS (PE), CURRENT PREGNANCY: ICD-10-CM

## 2020-06-23 DIAGNOSIS — O99.019 IRON DEFICIENCY ANEMIA DURING PREGNANCY: ICD-10-CM

## 2020-06-23 DIAGNOSIS — D50.9 IRON DEFICIENCY ANEMIA DURING PREGNANCY: ICD-10-CM

## 2020-06-23 DIAGNOSIS — Z3A.35 35 WEEKS GESTATION OF PREGNANCY: Primary | ICD-10-CM

## 2020-06-23 DIAGNOSIS — T45.4X5A ADVERSE EFFECT OF IRON AND ITS COMPOUNDS, INITIAL ENCOUNTER: ICD-10-CM

## 2020-06-23 DIAGNOSIS — D68.62 LUPUS ANTICOAGULANT WITH HYPERCOAGULABLE STATE (HCC): ICD-10-CM

## 2020-06-23 DIAGNOSIS — I26.99 ACUTE PULMONARY EMBOLISM WITHOUT ACUTE COR PULMONALE, UNSPECIFIED PULMONARY EMBOLISM TYPE (HCC): Primary | ICD-10-CM

## 2020-06-23 LAB
BASOPHILS # BLD AUTO: 0.04 10*3/MM3 (ref 0–0.2)
BASOPHILS NFR BLD AUTO: 0.4 % (ref 0–1.5)
CLARITY, POC: CLEAR
COLOR UR: YELLOW
DEPRECATED RDW RBC AUTO: 46.5 FL (ref 37–54)
EOSINOPHIL # BLD AUTO: 0.06 10*3/MM3 (ref 0–0.4)
EOSINOPHIL NFR BLD AUTO: 0.6 % (ref 0.3–6.2)
ERYTHROCYTE [DISTWIDTH] IN BLOOD BY AUTOMATED COUNT: 13.6 % (ref 12.3–15.4)
FERRITIN SERPL-MCNC: 14.2 NG/ML (ref 11–207)
GLUCOSE UR STRIP-MCNC: NEGATIVE MG/DL
HCT VFR BLD AUTO: 38.5 % (ref 34–46.6)
HGB BLD-MCNC: 12.5 G/DL (ref 12–15.9)
IMM GRANULOCYTES # BLD AUTO: 0.14 10*3/MM3 (ref 0–0.05)
IMM GRANULOCYTES NFR BLD AUTO: 1.4 % (ref 0–0.5)
IRON 24H UR-MRATE: 46 MCG/DL (ref 37–145)
IRON SATN MFR SERPL: 8 % (ref 14–48)
LYMPHOCYTES # BLD AUTO: 2.01 10*3/MM3 (ref 0.7–3.1)
LYMPHOCYTES NFR BLD AUTO: 19.7 % (ref 19.6–45.3)
MCH RBC QN AUTO: 30.3 PG (ref 26.6–33)
MCHC RBC AUTO-ENTMCNC: 32.5 G/DL (ref 31.5–35.7)
MCV RBC AUTO: 93.2 FL (ref 79–97)
MONOCYTES # BLD AUTO: 0.67 10*3/MM3 (ref 0.1–0.9)
MONOCYTES NFR BLD AUTO: 6.6 % (ref 5–12)
NEUTROPHILS # BLD AUTO: 7.28 10*3/MM3 (ref 1.7–7)
NEUTROPHILS NFR BLD AUTO: 71.3 % (ref 42.7–76)
NRBC BLD AUTO-RTO: 0 /100 WBC (ref 0–0.2)
PLATELET # BLD AUTO: 227 10*3/MM3 (ref 140–450)
PMV BLD AUTO: 9.8 FL (ref 6–12)
PROT UR STRIP-MCNC: ABNORMAL MG/DL
RBC # BLD AUTO: 4.13 10*6/MM3 (ref 3.77–5.28)
TIBC SERPL-MCNC: 557 MCG/DL (ref 249–505)
TRANSFERRIN SERPL-MCNC: 398 MG/DL (ref 200–360)
WBC NRBC COR # BLD: 10.2 10*3/MM3 (ref 3.4–10.8)

## 2020-06-23 PROCEDURE — 85025 COMPLETE CBC W/AUTO DIFF WBC: CPT

## 2020-06-23 PROCEDURE — 82728 ASSAY OF FERRITIN: CPT

## 2020-06-23 PROCEDURE — 36415 COLL VENOUS BLD VENIPUNCTURE: CPT

## 2020-06-23 PROCEDURE — 83540 ASSAY OF IRON: CPT

## 2020-06-23 PROCEDURE — 0502F SUBSEQUENT PRENATAL CARE: CPT | Performed by: OBSTETRICS & GYNECOLOGY

## 2020-06-23 PROCEDURE — 84466 ASSAY OF TRANSFERRIN: CPT

## 2020-06-23 PROCEDURE — 99214 OFFICE O/P EST MOD 30 MIN: CPT | Performed by: INTERNAL MEDICINE

## 2020-06-23 RX ORDER — PANTOPRAZOLE SODIUM 40 MG/1
40 TABLET, DELAYED RELEASE ORAL DAILY
Qty: 30 TABLET | Refills: 0 | Status: ON HOLD | OUTPATIENT
Start: 2020-06-23 | End: 2020-07-14

## 2020-06-23 RX ORDER — HEPARIN SODIUM 20000 [USP'U]/ML
10000 INJECTION INTRAVENOUS; SUBCUTANEOUS EVERY 12 HOURS
Qty: 30 ML | Refills: 1 | Status: SHIPPED | OUTPATIENT
Start: 2020-06-23 | End: 2020-06-24 | Stop reason: SDUPTHER

## 2020-06-23 RX ORDER — HEPARIN SODIUM 20000 [USP'U]/ML
10000 INJECTION INTRAVENOUS; SUBCUTANEOUS EVERY 12 HOURS
Qty: 30 ML | Refills: 1 | Status: SHIPPED | OUTPATIENT
Start: 2020-06-23 | End: 2020-06-23 | Stop reason: SDUPTHER

## 2020-06-23 NOTE — TELEPHONE ENCOUNTER
Spoke with niesha, niesha stated that it was okay for child to come due to the patient not having any childcare.

## 2020-06-23 NOTE — PROGRESS NOTES
CC: ob visit  HPI: pt presents for routine visit. She reports active fetal movement. She denies vag bleeding, reg ctx or leaking fluid. She did have some mucous discharge recently.   She has had more GERD lately and notes that pepcid or tums are not addressing her symptoms as well any longer  O: gen: pt in no distress  Pulm: normal respiratory effort  FH= 36 cm, ext: no cords, edema or tenderness  Auscultated fetal heart rate for over 2 min and regular rhythm noted     A/p: hx of PE during pregnancy: pt on lovenox 80 mg sc twice daily. She has appt with her hematologist today and will be adjusting to heparin therapy. Pt has been counseled to hold anticoagulant dose with evidence of labor. Continue weekly BPP. Ultrasound RN deferred today as pt is scheduled at [facility] tomorrow for growth u/s and consult. Advised pt she will need her BPP tomorrow with u/s and consult and she notes understanding. Pt reports active fetal movement and is advised to continue daily [facility]    Irregular fetal heart rate: pt had normal fetal echo. Fetal heart rate was normal with doppler over 2 min today. Pt has f/u u/s with [facility] tomorrow    Hx c/s X 3: pt has scheduled repeat c/s at 38 weeks with tubal. Tubal papers have been previously signed.

## 2020-06-23 NOTE — PROGRESS NOTES
.     REASONS FOR FOLLOW-UP:     1. Pulmonary emboli involving the right main pulmonary artery during her pregnancy at 21 weeks.  Patient currently on Lovenox injection 80 mg q.12 h.  Patient is currently 35 and a half weeks pregnant.  · Hypercoagulable workup without protein S or protein C profile on 3/16/2020 showed positive lupus anticoagulant and a marginally low Antithrombin III activity 82%.  2.  Anemia, unable to tolerate oral iron treatment once a day because of constipation.    · Patient received 3 doses of IV Venofer 300 mg each on 3/25/2020, 2020, and 2020.    HISTORY OF PRESENT ILLNESS:  The patient is a 25 y.o. female with the above-mentioned history who presents today for 4-week re-evaluation with laboratory review.    Patient is currently 35 and a half weeks pregnant.  She continues to use Lovenox injections 80 mg q.12 h.  Patient reports bruising at the site of Lovenox injection, but she reports no spontaneous epistaxis or gum bleeding or any other bleeding problem.  She continues to follow up with Dr. Mittal, and is scheduled to have her  section done on 2020.  Patient has had 3 prior  sections done.      She is not taking oral iron at this time and was previously unable to tolerate oral iron treatment once a day due to nausea or constipation.  She reports that she has only tried supplemental iron while she was previously pregnant.    Patient currently has rapid pulse of 108 and her O2 saturation is 98% with /73.     Laboratory studies today, 2020, showed normal hemoglobin 12.5 MCV 93.2, platelets 227,000 and WBC 10,200 including ANC 7280 lymphocytes 2000.  Iron studies pending.     Past Medical History:   Diagnosis Date   • Abnormal Pap smear of cervix    • Chlamydia     at age 14 ,   • H/O Depressive disorder    • Herpes     last outbreak    • History of anxiety    • History of urinary tract infection    • Pulmonary embolism (CMS/HCC) 2020     during pregnancy     Past Surgical History:   Procedure Laterality Date   •  SECTION  , , 2015    x3      ONCOLOGIC/HEMATOLOGIC HISTORY: (History from previous dates can be found in the separate document.)   The patient is a 25 y.o. female who presented for initial evaluation on 3/16/2020, referred by her GYN specialist, Dr. Mittal, because of newly diagnosed pulmonary emboli.     Patient reports she had episodes of flu back in 2020. She had lingering issues with some dyspnea. Patient also reported she had tachycardia associated with her shortness of breath. She was seen by Cardiology service and thought related to her pregnancy.  She did have worsening problem and eventually had CT angiogram study on 2020 when she was evaluated in the emergency room and admitted to the hospital for worsening dyspnea. This study reported several small pulmonary emboli in the adjacent branches of the right main pulmonary artery. There were no lung lesions. The upper abdomen showed unremarkable liver, spleen and adrenal glands. Patient was started on Lovenox every 12 hours anticoagulation.     Patient reports bruising at the site of Lovenox injection. She currently uses 80 mg q.12 h. She reports no spontaneous epistaxis or gum bleeding or any other bleeding problem.     As of 3/16/2020, patient was 22 and 1/2 weeks pregnant.  She reports no previous history of thrombophilia.  She had previous 3 C-sections with successful pregnancies. She reports previous use of birth control pills for about 3 years without problem.  She also has no family history for thrombophilia.     Patient recently was started on oral iron treatment during her hospitalization in early 3/2020.  This patient had normal laboratory study, CBC on 2019 with hemoglobin 12.9, MCV 87 and platelets 394,000, WBC 6600.  Her labs on 2020 reported hemoglobin 12.1, MCV 88.8, platelets 294,000 and WBC 8550 including ANC 5630, lymphocytes 2170.   She had normal TSH 1.66 that day. On 03/07/2020, she had hemoglobin 11.7.  On 03/08/2020, her hemoglobin was 10.7 with MCV 88.7, MCHC 34.2.  Normal platelets and WBC.     Patient reports she was not able to tolerate oral iron once a day. She has not been taking iron for the past few days. She reports constipation secondary to oral iron treatment. Patient also reports previously she was given oral iron treatment during her 1st pregnancy and she did not tolerate it.     Laboratory study on 3/16/2020 reported hemoglobin 11.3, MCV 87.7, MCHC 34.5. Platelets 248,000 and WBC 9300 including ANC 5920, lymphocytes 2360.  Iron saturation 9% on 3/16/2020, and clearly patient has iron deficiency.      Hypercoagulable workup, without protein S or protein C profile, on 3/16/2020 showed positive for lupus anticoagulant, however negative for anticardiolipin antibodies, anti-beta-2 glycoprotein 1 antibodies, and a negative for antiphospholipid antibody panel #2.  She had a marginal Antithrombin activity 82%, negative for factor II prothrombin mutation and negative for factor V Leiden mutation.     Patient received 3 doses of IV Venofer 300 mg each on 3/25/2020, 4/1/2020, and 4/8/2020 with good tolerance.  She reports that her energy level moderately improved with the IV iron therapy, and she no longer has pica for ice chips.    The patient presented to the [facility] ER on 3/27/2020 complaining of progressive shortness of breath over the prior week.  She was placed on a pulse/oximeter and was noted to have a pulse of 70-80 with O2 saturation of % during her stay in the ED.  Her chest exam was normal, and her laboratory studies showed hemoglobin 10.7.  She was discharged home under stable conditions and was advised to follow up with Dr. Mittal, her Ob/Gyn, for further evaluation.    On 3/20/2020, Dr. Hinds, at [facility], recommended changing the patient to heparin at approximately 35 weeks pregnancy, rather than continuing  lovenox.     Laboratory studies on 5/26/2020 showed normalized hemoglobin 12.2, and improved iron saturation 24%, ferritin 33.1, free iron 126 and a TIBC 528.  Maintains normal WBC 9380, and platelets 211,000.       MEDICATIONS    Current Outpatient Medications:   •  albuterol sulfate  (90 Base) MCG/ACT inhaler, Inhale 2 puffs Every 4 (Four) Hours As Needed for Wheezing., Disp: , Rfl:   •  enoxaparin (LOVENOX) 80 MG/0.8ML solution syringe, Inject 80 mg under the skin into the appropriate area as directed Every 12 (Twelve) Hours., Disp: , Rfl:   •  ondansetron ODT (ZOFRAN-ODT) 4 MG disintegrating tablet, ONE EVERY 8 HOURS AS NEEDED FOR NAUSEA, Disp: , Rfl:   •  pantoprazole (Protonix) 40 MG EC tablet, Take 1 tablet by mouth Daily., Disp: 30 tablet, Rfl: 0  •  Prenatal Vit-Fe Fumarate-FA (PRENATAL, CLASSIC, VITAMIN) 28-0.8 MG tablet tablet, Take 1 tablet by mouth Daily., Disp: , Rfl:     ALLERGIES:   No Known Allergies    SOCIAL HISTORY:       Social History     Socioeconomic History   • Marital status: Single     Spouse name: Not on file   • Number of children: 3   • Years of education: Not on file   • Highest education level: Not on file   Occupational History   • Occupation: Nurse     Employer: [employer]   Tobacco Use   • Smoking status: Never Smoker   • Smokeless tobacco: Never Used   Substance and Sexual Activity   • Alcohol use: No   • Drug use: No   • Sexual activity: Yes     Partners: Male   · Patient is a rare social drinker.        FAMILY HISTORY:  Family History   Problem Relation Age of Onset   • Diabetes Maternal Uncle    • Breast cancer Paternal Grandmother    • Breast cancer Paternal Aunt    • Breast cancer Paternal Aunt    • Thyroid disease Mother    · No hematology disorder in the family, including thrombophilia.    REVIEW OF SYSTEMS:  Review of Systems   Constitutional: Negative for appetite change, fatigue and unexpected weight change.        Resolution of pica for ice  chips   HENT: Negative for congestion, mouth sores, nosebleeds and sore throat.         No gum bleeding   Eyes: Negative for photophobia and visual disturbance.   Respiratory: Positive for shortness of breath (Mild exertional dyspnea).    Cardiovascular: Negative for chest pain, palpitations and leg swelling.   Gastrointestinal: Negative for abdominal pain, blood in stool and nausea.   Endocrine: Negative for cold intolerance and polydipsia.   Genitourinary: Negative for dysuria and hematuria.   Musculoskeletal: Negative for back pain and joint swelling.   Skin: Negative for color change and rash.   Allergic/Immunologic: Positive for immunocompromised state (Patient is pregnant ). Negative for environmental allergies.   Neurological: Negative for dizziness, syncope, numbness and headaches.   Hematological: Negative for adenopathy. Bruises/bleeds easily (At the site of Lovenox injection).   Psychiatric/Behavioral: Negative for agitation and confusion.            Vitals:    06/23/20 1309   BP: 105/73   Pulse: 108   Resp: 21   Temp: 97.9 °F (36.6 °C)   TempSrc: Oral   SpO2: 98%   Weight: 87.9 kg (193 lb 12.8 oz)   PainSc: 0-No pain     Current Status 6/23/2020   ECOG score 0       PHYSICAL EXAM:    CONSTITUTIONAL:  Vital signs reviewed.  Well-developed well-nourished pregnant female, no distress, looks comfortable.  EYES:  Conjunctiva and lids unremarkable.  Pupils equal size and round.  EARS,NOSE,MOUTH,THROAT:  Patient wears mask due to the pandemic coronavirus infection.   RESPIRATORY:  Normal respiratory effort.  Lungs clear to auscultation bilaterally.  CARDIOVASCULAR: Regular rhythm and rate.  Normal S1, S2.  No murmurs rubs or gallops.  No significant lower extremity edema.  GASTROINTESTINAL: Patient is pregnant.  Limited examination.  Bowel sounds present.    LYMPHATIC:  No cervical, supraclavicular lymphadenopathy.  MUSCULOSKELETAL:  Unremarkable gait and station.  Unremarkable digits/nails.  No cyanosis or  clubbing.  SKIN:  Warm.  No rashes.  PSYCHIATRIC:  Normal judgment and insight.  Normal mood and affect.    RECENT LABS:  Lab Results   Component Value Date    WBC 10.20 06/23/2020    HGB 12.5 06/23/2020    HCT 38.5 06/23/2020    MCV 93.2 06/23/2020     06/23/2020     Lab Results   Component Value Date    NEUTROABS 7.28 (H) 06/23/2020     Lab Results   Component Value Date    IRON 126 05/26/2020    TIBC 528 (H) 05/26/2020    FERRITIN 33.10 05/26/2020   Iron saturation 24% on 5/26/2020        Assessment/Plan      ASSESSMENT:   1. Pulmonary emboli involving the right main pulmonary artery during her pregnancy at 21 weeks, secondary hypercoagulable status positive for lupus anticoagulant.    · I agree for the patient to continue Lovenox injection 80 mg q.12 h.   · Nevertheless, I recommended to have laboratory studies for hypercoagulable status for antiphospholipid syndrome and also we will check both factor II and factor V Leiden gene mutations together with antithrombin III. I advised against protein C and protein S study since she is pregnant and those levels would be artificially low. I recommended to test those in the future, 2 months after her delivery.   · I discussed with the patient on 3/16/2020 for the length of treatment, since she had a small pulmonary emboli, if her laboratory study was benign then she could be treated for 6 months and then stop.  If she has positive antiphospholipid antibodies then this needs to be reevaluated after 6 months anticoagulation.   · Hypercoagulable workup without protein S or protein C profile on 3/16/2020 showed positive for lupus anticoagulant, but negative for antiphospholipid antibodies including panel #2.  I discussed with the patient that lupus anticoagulant could be related to her pregnancy, this will need to be repeated in the future after childbirth.  If she has persistent lupus anticoagulant, she will likely require life-long anticoagulation due to her  hypercoagulable status.  However, these positive results may be due to her current pregnancy, and it may disappear after childbirth, so these studies should be repeated in the future when she is not pregnant.  · Patient continues on Lovenox injection 80 mg q.12 h.  Dr. Hinds, at [facility], recently recommended changing the patient to heparin at approximately 35 weeks pregnancy, rather than continuing lovenox.  I discussed with the patient on 2020 that I agree with Dr. Hinds, and we will plan to switch her from Lovenox injections to Heparin twice a day when she is 35 weeks pregnant.  · Patient is currently 35 and a half weeks pregnant 2020.  We will switch her from Lovenox injections to Heparin twice a day at this time.  She will continue to follow up with her Ob/Gyn, Dr. Mittal, and is scheduled to have a  section done on 2020.     2.  Iron deficiency anemia.    · Unable to tolerate oral iron treatment once a day because of constipation. I looked at her laboratory results.  There was no documented iron study.  I recommended to obtain a baseline on 3/16/2020.  · Laboratory studies on 3/16/2020 showed iron saturation 9%, and the ferritin 12.2.  She clearly has iron deficiency.  I recommended intravenous iron therapy with Venofer 300 mg weekly for 3 doses and patient is agreeable.   · Patient received 3 doses of IV Venofer 300 mg each on 3/25/2020, 2020, and 2020.  Patient reports resolution of pica for ice chips.  · Laboratory studies 2020 showed normalized hemoglobin 12.2, with normalized iron saturation 24% and slightly improved ferritin level 33.1 ng/mL.  Highly suspect she will become iron deficiency again.  · Laboratory studies today, 2020, show normal hemoglobin 12.5.  Iron studies pending..    · She is not taking oral iron at this time and was previously unable to tolerate oral iron treatment once a day due to nausea or constipation.  Patient reports that she has only  tried supplemental iron during her prior pregnancies.  I advised the patient that she should start taking oral iron after she delivers.  The patient verbalized understanding, had all questions answered, and agreed with the plan.      PLAN:   1. Switch from Lovenox injections 80 mg q.12 h. to Heparin twice a day at this time.  Patient will have self injection around 7 AM and 7 PM every day.  I E-scribed the Heparin to the patient's pharmacy today.   2. Continue follow up with her Ob-Gyn, Dr. Mittal.    3. Patient will return this Friday, 2020, for aPTT check around 1 PM, 6 hours after her heparin injection, and with nurse review and to determine if the dosing of Heparin needs to be adjusted.  4. We will arrange patient to come back for reevaluation in 2 weeks with labs, CBC and aPTT.   5. Scheduled to have a  section done on 2020.   6. I advised the patient to start taking oral iron after she delivers.      By signing my name here, I Zack Bentley, attest that all documentation on 20 at 13:41 has been prepared under the direction and in the presence of Dr. Ana Ho MD.    I reviewed the note scribed by Zack Bentley and made appropriate corrections.       Addendum:     Lab Results   Component Value Date    IRON 46 2020    TIBC 557 (H) 2020    FERRITIN 14.20 2020     Iron saturation 8% on 2020.    Iron study returns after patient left the clinic.  She has recurrent iron deficiency.  I called and left a message for her, for possible intravenous Venofer treatment again.  I am waiting for her call back.       ANA HO M.D., Ph.D.          CC:  Noemi Mittal MD

## 2020-06-23 NOTE — TELEPHONE ENCOUNTER
Pt calling to have heparin and syringes sent to another pharmacy as her regular pharmacy doesn't have heparin in stock. Escribed to the requested pharmacy.

## 2020-06-23 NOTE — TELEPHONE ENCOUNTER
PT HAS AN APPT ON 7/7/20 WITH DR JETER PT WANTS TO KNOW IF THERE ARE ANY APPTS EARLIER IN THE DAY ? IF NOT PT WILL KEEP THE APPT ON 7/7/20 PT WOULD PREFER TO COME IN THE MORNING     PT CONTACT # [phone]

## 2020-06-23 NOTE — TELEPHONE ENCOUNTER
PT NEEDS HER PRESCRIPTION FOR Heparin Sodium, Porcine, (HEPARIN, PORCINE,) 53778 UNIT/ML injection     CALLED INTO A DIFFERENT PHARMACY IT WAS CALLED INTO [facility] THEY DO NOT HAVE IT IN STOCK PT WANTS IT CALLED INTO [facility] SHE HAS SPOKEN WITH THEM THEY DO HAVE IT AVAILABLE PT ALSO NEEDS A PRESCRIPTION FOR THE NEEDLES CALLED IN     PT CONTACT # [phone]      [facility] # [phone]

## 2020-06-24 ENCOUNTER — HOSPITAL ENCOUNTER (OUTPATIENT)
Dept: ULTRASOUND IMAGING | Facility: HOSPITAL | Age: 25
End: 2020-06-24

## 2020-06-24 RX ORDER — HEPARIN SODIUM 20000 [USP'U]/ML
10000 INJECTION INTRAVENOUS; SUBCUTANEOUS EVERY 12 HOURS
Qty: 30 ML | Refills: 1 | Status: SHIPPED | OUTPATIENT
Start: 2020-06-24 | End: 2020-06-25 | Stop reason: SDUPTHER

## 2020-06-24 NOTE — TELEPHONE ENCOUNTER
----- Message from Letty Agarwal Rep sent at 6/24/2020 10:14 AM EDT -----  Called pt with her jaun'ts for the venofer, she stated that her pharmacy only has the needles and not any of the heparin called in to them.    If you could please check as she is trying to pick this up now.    Thank you!  Gem

## 2020-06-24 NOTE — TELEPHONE ENCOUNTER
RX for heparin sent in yesterday did not go through  I resent the RX and it did go through  I called patient and left VM that med RX was at the pharmacy

## 2020-06-25 RX ORDER — ACETAMINOPHEN 325 MG/1
650 TABLET ORAL ONCE
Status: CANCELLED | OUTPATIENT
Start: 2020-07-05

## 2020-06-25 RX ORDER — SODIUM CHLORIDE 9 MG/ML
250 INJECTION, SOLUTION INTRAVENOUS ONCE
Status: CANCELLED | OUTPATIENT
Start: 2020-07-07

## 2020-06-25 RX ORDER — DIPHENHYDRAMINE HYDROCHLORIDE 50 MG/ML
25 INJECTION INTRAMUSCULAR; INTRAVENOUS ONCE
Status: CANCELLED | OUTPATIENT
Start: 2020-07-07

## 2020-06-25 RX ORDER — ACETAMINOPHEN 325 MG/1
650 TABLET ORAL ONCE
Status: CANCELLED | OUTPATIENT
Start: 2020-07-07

## 2020-06-25 RX ORDER — SODIUM CHLORIDE 9 MG/ML
250 INJECTION, SOLUTION INTRAVENOUS ONCE
Status: CANCELLED | OUTPATIENT
Start: 2020-07-05

## 2020-06-25 RX ORDER — HEPARIN SODIUM 20000 [USP'U]/ML
10000 INJECTION INTRAVENOUS; SUBCUTANEOUS EVERY 12 HOURS
Qty: 30 ML | Refills: 1 | Status: SHIPPED | OUTPATIENT
Start: 2020-06-25 | End: 2020-06-25 | Stop reason: SDUPTHER

## 2020-06-25 RX ORDER — HEPARIN SODIUM 20000 [USP'U]/ML
10000 INJECTION INTRAVENOUS; SUBCUTANEOUS EVERY 12 HOURS
Qty: 30 ML | Refills: 1 | Status: SHIPPED | OUTPATIENT
Start: 2020-06-25 | End: 2020-07-01

## 2020-06-25 RX ORDER — DIPHENHYDRAMINE HYDROCHLORIDE 50 MG/ML
25 INJECTION INTRAMUSCULAR; INTRAVENOUS ONCE
Status: CANCELLED | OUTPATIENT
Start: 2020-07-05

## 2020-06-25 NOTE — NURSING NOTE
Called pt for prescreening and car registration instructions. Pt informed me that she needs to reschedule and has not started heparin injections.  Pt stated that [facility] told her they are out of heparin.  Refill now re-routing to [facility] per pt request.  Message to Dr. Ho to approve reschedule of appt to Monday and to follow timing per his last note.

## 2020-06-26 ENCOUNTER — APPOINTMENT (OUTPATIENT)
Dept: ONCOLOGY | Facility: HOSPITAL | Age: 25
End: 2020-06-26

## 2020-06-26 ENCOUNTER — APPOINTMENT (OUTPATIENT)
Dept: LAB | Facility: HOSPITAL | Age: 25
End: 2020-06-26

## 2020-06-29 ENCOUNTER — TELEPHONE (OUTPATIENT)
Dept: ONCOLOGY | Facility: CLINIC | Age: 25
End: 2020-06-29

## 2020-06-29 ENCOUNTER — LAB (OUTPATIENT)
Dept: LAB | Facility: HOSPITAL | Age: 25
End: 2020-06-29

## 2020-06-29 ENCOUNTER — CLINICAL SUPPORT (OUTPATIENT)
Dept: ONCOLOGY | Facility: HOSPITAL | Age: 25
End: 2020-06-29

## 2020-06-29 ENCOUNTER — DOCUMENTATION (OUTPATIENT)
Dept: ONCOLOGY | Facility: CLINIC | Age: 25
End: 2020-06-29

## 2020-06-29 ENCOUNTER — TELEPHONE (OUTPATIENT)
Dept: GENERAL RADIOLOGY | Facility: HOSPITAL | Age: 25
End: 2020-06-29

## 2020-06-29 ENCOUNTER — APPOINTMENT (OUTPATIENT)
Dept: ONCOLOGY | Facility: HOSPITAL | Age: 25
End: 2020-06-29

## 2020-06-29 DIAGNOSIS — I26.99 ACUTE PULMONARY EMBOLISM WITHOUT ACUTE COR PULMONALE, UNSPECIFIED PULMONARY EMBOLISM TYPE (HCC): ICD-10-CM

## 2020-06-29 LAB — APTT PPP: 30.8 SECONDS (ref 22.7–35.4)

## 2020-06-29 PROCEDURE — 85730 THROMBOPLASTIN TIME PARTIAL: CPT | Performed by: INTERNAL MEDICINE

## 2020-06-29 PROCEDURE — 36415 COLL VENOUS BLD VENIPUNCTURE: CPT

## 2020-06-29 NOTE — PROGRESS NOTES
Pt here for PTT and heparin adjustment. She left after PTT drawn. This lab goes over to the hospital to be ran. Called pt and told her that it would about an hour before it came back and that I would call her once it resulted.        Sidra Gonzalez NP received result. See her note regarding dosing.

## 2020-06-29 NOTE — PROGRESS NOTES
Patient took 1 dose of 20,000 units of heparin this morning.  PTT this afternoon returning normal at 30.  I discussed with Ryanne Avalos and Isai in Dr. Ho's absence.  We will have the patient do another 20,000 unit dose tonight.  Otherwise I will talk to Dr. Ho in the morning regarding further dosing.  Patient informed that we will likely need another PTT tomorrow for further instructions on dosing.  She verbalized understanding.

## 2020-06-29 NOTE — TELEPHONE ENCOUNTER
----- Message from Mayra Arriola RN sent at 6/29/2020 10:39 AM EDT -----  Pt needs a PTT and RN review today at 3pm. This is timed based on medication administration. If [facility]ri does not have an opening then she can go to [place]. Must be done today. thanks

## 2020-06-29 NOTE — TELEPHONE ENCOUNTER
----- Message from Mayra Arriola RN sent at 6/29/2020 10:41 AM EDT -----  Also, pt needs to reschedule IV venofer to the weekend do to . Can you ask 3[facility] treatment room if they will do outpatient IV venofer on 7/5/20 or 7/11/20?    Thank you

## 2020-06-29 NOTE — TELEPHONE ENCOUNTER
Called and spoke with pt. She states she had lots of trouble getting her heparin this past week and took her first injection this morning at 9am. Needs to reschedule her lab check. Reviewed with Sidra LARIOS. Have pt come today at 3pm to have labs drawn and see a Nurse to adjust heparin if necessary. Pt also states due to  she can not receive her venofer on 7/7, she wants to get her venofer on the weekend. I told her that we can not do weekends but that I would see if scheduling can check with [facility] outpatient treatment room to see if it can be done. Await response.

## 2020-06-30 ENCOUNTER — TELEPHONE (OUTPATIENT)
Dept: ONCOLOGY | Facility: CLINIC | Age: 25
End: 2020-06-30

## 2020-06-30 ENCOUNTER — OFFICE VISIT (OUTPATIENT)
Dept: OBSTETRICS AND GYNECOLOGY | Facility: CLINIC | Age: 25
End: 2020-06-30

## 2020-06-30 ENCOUNTER — LAB (OUTPATIENT)
Dept: LAB | Facility: HOSPITAL | Age: 25
End: 2020-06-30

## 2020-06-30 ENCOUNTER — TRANSCRIBE ORDERS (OUTPATIENT)
Dept: ULTRASOUND IMAGING | Facility: HOSPITAL | Age: 25
End: 2020-06-30

## 2020-06-30 ENCOUNTER — PROCEDURE VISIT (OUTPATIENT)
Dept: OBSTETRICS AND GYNECOLOGY | Age: 25
End: 2020-06-30

## 2020-06-30 ENCOUNTER — ROUTINE PRENATAL (OUTPATIENT)
Dept: OBSTETRICS AND GYNECOLOGY | Age: 25
End: 2020-06-30

## 2020-06-30 VITALS
TEMPERATURE: 97.3 F | SYSTOLIC BLOOD PRESSURE: 110 MMHG | WEIGHT: 196 LBS | DIASTOLIC BLOOD PRESSURE: 68 MMHG | HEIGHT: 60 IN | BODY MASS INDEX: 38.48 KG/M2 | HEART RATE: 84 BPM

## 2020-06-30 VITALS — SYSTOLIC BLOOD PRESSURE: 100 MMHG | DIASTOLIC BLOOD PRESSURE: 60 MMHG | BODY MASS INDEX: 38.52 KG/M2 | WEIGHT: 196.2 LBS

## 2020-06-30 DIAGNOSIS — O88.219 MATERNAL PULMONARY EMBOLUS (PE), CURRENT PREGNANCY: ICD-10-CM

## 2020-06-30 DIAGNOSIS — I26.99 ACUTE PULMONARY EMBOLISM WITHOUT ACUTE COR PULMONALE, UNSPECIFIED PULMONARY EMBOLISM TYPE (HCC): Primary | ICD-10-CM

## 2020-06-30 DIAGNOSIS — O88.219 MATERNAL PULMONARY EMBOLUS (PE), CURRENT PREGNANCY: Primary | ICD-10-CM

## 2020-06-30 DIAGNOSIS — Z98.891 HISTORY OF CESAREAN DELIVERY: ICD-10-CM

## 2020-06-30 DIAGNOSIS — Z3A.36 36 WEEKS GESTATION OF PREGNANCY: Primary | ICD-10-CM

## 2020-06-30 LAB
APTT PPP: 31 SECONDS (ref 22.7–35.4)
CLARITY, POC: CLEAR
COLOR UR: YELLOW
GLUCOSE UR STRIP-MCNC: NEGATIVE MG/DL
PROT UR STRIP-MCNC: NEGATIVE MG/DL

## 2020-06-30 PROCEDURE — 59426 ANTEPARTUM CARE ONLY: CPT | Performed by: OBSTETRICS & GYNECOLOGY

## 2020-06-30 PROCEDURE — 99214 OFFICE O/P EST MOD 30 MIN: CPT | Performed by: OBSTETRICS & GYNECOLOGY

## 2020-06-30 PROCEDURE — 76819 FETAL BIOPHYS PROFIL W/O NST: CPT | Performed by: OBSTETRICS & GYNECOLOGY

## 2020-06-30 PROCEDURE — 85730 THROMBOPLASTIN TIME PARTIAL: CPT | Performed by: NURSE PRACTITIONER

## 2020-06-30 NOTE — TELEPHONE ENCOUNTER
Called pt back, she has already spoken to Sidra Gonzalez NP and will take her AM dose of heparin and return to the office this afternoon for another lab review.

## 2020-06-30 NOTE — PROGRESS NOTES
Patient seen in Maternal Fetal Medicine clinic today. Please see full note in [facility].   Barbara Perez MD

## 2020-06-30 NOTE — TELEPHONE ENCOUNTER
Patient had left a message about her heparin shot .Dr. devlin told patient not to take her heparin shot until her labs come back. Hasn't heard anything back       Call patient back at [phone]

## 2020-06-30 NOTE — PROGRESS NOTES
Pt presents for routine f/u visit. She cancelled her MFM appt last week because she did not have . She denies any regular ctx, vag bleeding or leaking fluid. She notes active fetal movement. She denies any soa or chest pain    O: u/s: efw 6 lb 14 oz, antonella 9.96, BPP 8/8; regular fetal heart rate noted during u/s  Ext: no edema or cords, no tenderness in bilateral lower extremities    A/p: 36 weeks with hx of PE this pregnancy, now on heparin per hematology. She reports she was started on 20,000 units twice daily and is supposed to call in this am to adjust dose based on her lab results. She cancelled MFM appt last week due to . Fetal growth is appropriate today and BPP is 8/8. Contacted [facility] for verbal consult regarding admission planning and timing of anticoagulation for upcoming c/s. They can see pt this am so she was directed there. Advised twice daily fmc's and labor warnings. Pt is aware to hold anticoagulation for signs of labor.     Hx c/s X 3: pt is scheduled for repeat c/s at 38 weeks with BTI. Tubal papers are in epic    Hx herpes: pt denies any symptoms and declines prophylaxis at this time    Irregular feta heart rate previously noted: pt had normal echo with [facility]. Regular fetal heart rate noted today.    Hx Palpitations: this occurred earlier in pregnancy. Pt was evaluated by cardiologist and without significant findings. She will see him again postpartum. Pt denies symptoms recently.

## 2020-06-30 NOTE — PROGRESS NOTES
Maternal Fetal Medicine Consult    Patient Name:  Sandra Valadez  :  1995  MRN:  [id number]  Date of Service:  2020  Referring Provider: [unfilled]     ID:   25 y.o.  at 36w4d    Consultation due to:  History of PE, currently on anticoagulation (Heparin 20,000 u BID)     Pregnancy course significant for:   Patient Active Problem List   Diagnosis   • HSV (herpes simplex virus) anogenital infection   • Elevated prolactin level   • History of  delivery   • Urinary tract infection in mother during pregnancy, antepartum   • Tachycardia   • Dysplasia of cervix, low grade (JENNA 1)   • Group B Streptococcus urinary tract infection affecting pregnancy, antepartum   • Acute pulmonary embolism without acute cor pulmonale (CMS/HCC)   • Maternal pulmonary embolus (PE), current pregnancy   • Iron deficiency anemia during pregnancy   • Adverse effect of iron and its compounds, initial encounter   • Request for sterilization   • Lupus anticoagulant with hypercoagulable state (CMS/HCC)     Chief Complaint   Patient presents with   • High Risk Gestation     discuss plan of delivery, Pt on Lovenox        History of Present Illness:     25 year old  at 36 weeks 4 days gestation with h/o PE at 22 weeks gestation. Has been followed by Hematology. Undergoing thrombophilia work-up.   Was taking Lovenox 80mg BID. Now transitioned to Heparin 20,000 u BID - labs pending today with potential for adjustment.   Planned RCS on 20   No current issues. +FM. No LOF, VB, CTX.     Prenatal Labs   Lab Results   Component Value Date    HGB 12.5 2020    HEPBSAG Negative 2019    ABORH O Rh Positive 2015    ABO O 2020    RH Positive 2020    ABSCRN Negative 2020    BBY6UMU1 Non Reactive 2019    HEPCVIRUSABY <0.1 2019    URINECX Final report (A) 2020       REVIEW OF SYSTEMS   14 point review of systems reviewed. Pertinent positives and negatives reviewed in HPI. All  "other systems are negative.    OB HISTORY    OB History    Para Term  AB Living   5 3 3 0 1 3   SAB TAB Ectopic Molar Multiple Live Births   1 0 0 0 0 3      # Outcome Date GA Lbr Alphonso/2nd Weight Sex Delivery Anes PTL Lv   5 Current            4 Term    3629 g (8 lb) F CS-LTranv   [facility]   3 Term    3544 g (7 lb 13 oz) F CS-LTranv  N [place]   2 Term    4252 g (9 lb 6 oz) M CS-LTranv  N [place]      Birth Comments: for suspected macrosomia    1 SAB  12w0d    SAB         Birth Comments: no D&C        PAST MEDICAL HISTORY    Past Medical History:   Diagnosis Date   • Abnormal Pap smear of cervix    • Chlamydia     at age 14 ,   • H/O Depressive disorder    • Herpes     last outbreak    • History of anxiety    • History of urinary tract infection    • Pulmonary embolism (CMS/[facility]) 2020    during pregnancy       PAST SURGICAL HISTORY     has a past surgical history that includes  section (, , ).    CURRENT MEDICATIONS      Current Outpatient Medications:   •  albuterol sulfate  (90 Base) MCG/ACT inhaler, Inhale 2 puffs Every 4 (Four) Hours As Needed for Wheezing., Disp: , Rfl:   •  Heparin Sodium, Porcine, (HEPARIN, PORCINE,) 66041 UNIT/ML injection, Infuse 0.5 mL into a venous catheter Every 12 (Twelve) Hours. Please dispense adequate syringes and needles.  Dose will be adjusted based on lab results, Disp: 30 mL, Rfl: 1  •  Needle, Disp, 30G X 1/2\" misc, 0.5 mL 2 (Two) Times a Day., Disp: 50 each, Rfl: 0  •  ondansetron ODT (ZOFRAN-ODT) 4 MG disintegrating tablet, ONE EVERY 8 HOURS AS NEEDED FOR NAUSEA, Disp: , Rfl:   •  pantoprazole (Protonix) 40 MG EC tablet, Take 1 tablet by mouth Daily., Disp: 30 tablet, Rfl: 0  •  Prenatal Vit-Fe Fumarate-FA (PRENATAL, CLASSIC, VITAMIN) 28-0.8 MG tablet tablet, Take 1 tablet by mouth Daily., Disp: , Rfl:     ALLERGIES    Patient has no known allergies.    SOCIAL HISTORY    Social History     Tobacco Use   Smoking Status Never " Smoker   Smokeless Tobacco Never Used      Social History     Substance and Sexual Activity   Alcohol Use Not Currently     Social History     Substance and Sexual Activity   Drug Use No       FAMILY HISTORY  N/C     PHYSICAL EXAMINATION    Vital Signs Range for the last 24 hours  Temperature: Temp:  [97.3 °F (36.3 °C)] 97.3 °F (36.3 °C)   Temp Source:     BP: BP: (100-110)/(60-68) 110/68   Pulse: Heart Rate:  [84] 84   Respirations:     Weight: 88.9 kg (196 lb)     Normal vitals   Gen: no apparent distress   HEENT: normocephalic, atraumatic   Neuro: no focal deficits appreciated   Skin: no rashes or lesions visible.   Pulm: Normal effort   Psych: Alert, oriented   Abdomen: gravid   : gravid         Labs:  Labs:  WBC   Date Value Ref Range Status   06/23/2020 10.20 3.40 - 10.80 10*3/mm3 Final   04/16/2020 8.07 3.40 - 10.80 10*3/mm3 Final     RBC   Date Value Ref Range Status   06/23/2020 4.13 3.77 - 5.28 10*6/mm3 Final   04/16/2020 3.87 3.77 - 5.28 10*6/mm3 Final     Hemoglobin   Date Value Ref Range Status   06/23/2020 12.5 12.0 - 15.9 g/dL Final     Hematocrit   Date Value Ref Range Status   06/23/2020 38.5 34.0 - 46.6 % Final     MCV   Date Value Ref Range Status   06/23/2020 93.2 79.0 - 97.0 fL Final     MCH   Date Value Ref Range Status   06/23/2020 30.3 26.6 - 33.0 pg Final     MCHC   Date Value Ref Range Status   06/23/2020 32.5 31.5 - 35.7 g/dL Final     RDW   Date Value Ref Range Status   06/23/2020 13.6 12.3 - 15.4 % Final     RDW-SD   Date Value Ref Range Status   06/23/2020 46.5 37.0 - 54.0 fl Final     MPV   Date Value Ref Range Status   06/23/2020 9.8 6.0 - 12.0 fL Final     Platelets   Date Value Ref Range Status   06/23/2020 227 140 - 450 10*3/mm3 Final     Neutrophil %   Date Value Ref Range Status   06/23/2020 71.3 42.7 - 76.0 % Final     Lymphocyte %   Date Value Ref Range Status   06/23/2020 19.7 19.6 - 45.3 % Final     Monocyte %   Date Value Ref Range Status   06/23/2020 6.6 5.0 - 12.0 %  Final     Eosinophil %   Date Value Ref Range Status   2020 0.6 0.3 - 6.2 % Final     Basophil %   Date Value Ref Range Status   2020 0.4 0.0 - 1.5 % Final     Immature Grans %   Date Value Ref Range Status   2020 1.4 (H) 0.0 - 0.5 % Final     Neutrophils, Absolute   Date Value Ref Range Status   2020 7.28 (H) 1.70 - 7.00 10*3/mm3 Final     Lymphocytes, Absolute   Date Value Ref Range Status   2020 2.01 0.70 - 3.10 10*3/mm3 Final     Monocytes, Absolute   Date Value Ref Range Status   2020 0.67 0.10 - 0.90 10*3/mm3 Final     Eosinophils, Absolute   Date Value Ref Range Status   2020 0.06 0.00 - 0.40 10*3/mm3 Final     Basophils, Absolute   Date Value Ref Range Status   2020 0.04 0.00 - 0.20 10*3/mm3 Final     Immature Grans, Absolute   Date Value Ref Range Status   2020 0.14 (H) 0.00 - 0.05 10*3/mm3 Final     nRBC   Date Value Ref Range Status   2020 0.0 0.0 - 0.2 /100 WBC Final             ASSESSMENT/PLAN:  25 y.o. female  at 36w4d with history of PE > 3 months ago, currently on anticoagulation with Hepatin 20,00U BID - being managed primarily by Hematology.   Recommend stopping Heparin 24 hours prior to scheduled procedure (per ACOG) and restarting 6-12 hours post procedure.    Coagulation profile (aPTT) can be sent on admission to [facility] prior to administration of neuraxial analgesia   Treatment length to be determined by Hematology   At a minimum, patient needs prophylactic anticoagulation for 6 weeks PP.   I suspect dose and length of treatment will depend on repeat and pending thrombophilia evaluation    We discussed that Coumadin, Heparin and Lovenox are all compatible with breastfeeding     Thank you for this consultation.     Approximately 30 minutes was spent in care of this patient, of which greater than 50% was spent in face-to-face consultation and coordination of care.    Barbara Perez MD     2020   12:46

## 2020-07-01 ENCOUNTER — TELEPHONE (OUTPATIENT)
Dept: ONCOLOGY | Facility: CLINIC | Age: 25
End: 2020-07-01

## 2020-07-01 DIAGNOSIS — I26.99 ACUTE PULMONARY EMBOLISM WITHOUT ACUTE COR PULMONALE, UNSPECIFIED PULMONARY EMBOLISM TYPE (HCC): ICD-10-CM

## 2020-07-01 DIAGNOSIS — D68.62 LUPUS ANTICOAGULANT WITH HYPERCOAGULABLE STATE (HCC): Primary | ICD-10-CM

## 2020-07-01 RX ORDER — HEPARIN SODIUM 20000 [USP'U]/ML
20000 INJECTION INTRAVENOUS; SUBCUTANEOUS 2 TIMES DAILY
Qty: 60 ML | Refills: 1 | OUTPATIENT
Start: 2020-07-01 | End: 2020-07-17 | Stop reason: HOSPADM

## 2020-07-01 NOTE — PROGRESS NOTES
I called patient back this morning to discuss her PTT level from last night, still subtherapeutic.            Results from last 7 days   Lab Units 06/30/20  1500 06/29/20  1515   APTT seconds 31.0 30.8     Patient apologized and states she realizes that she told us wrong and that she thought she was injecting 20,000 units of Heparin twice a day but instead was injecting just 10,000 units twice a day.  This would explain why her number is too low.  After discussion with Dr. Ho we will have the patient go up to 20,000 units twice a day dosing.  I have called the patient's pharmacy to give new Rx for 20K units/mL vials. They have #15 in stock and will order the rest to cover last month of pregnancy.    Patient will return tomorrow to our [place] location for repeat stat PTT.  She will leave and we can call her later with results.  The results are to be reviewed with Dr. Ho for potential further dose adjustment of heparin.

## 2020-07-01 NOTE — TELEPHONE ENCOUNTER
PT CALLED AND SCHEDULED FOR HER LABS AND RN REVIEW AT THE [place] OFFICE AT 10:00AM AS SHE IS COMING IN FROM [facility], SHE DOES NOT WANT TO STAY FOR THE RESULTS AS SHE NEEDS TO LEAVE AND BE CALLED AT HOME

## 2020-07-01 NOTE — TELEPHONE ENCOUNTER
----- Message from AGUSTÍN Carcamo sent at 7/1/2020  9:18 AM EDT -----  Regarding: PTT  Patient needs appt for lab for PTT with RN review tomorrow (7/2) early AM.  Please call the patient with appointment time.  Thank you.

## 2020-07-02 ENCOUNTER — LAB (OUTPATIENT)
Dept: OTHER | Facility: HOSPITAL | Age: 25
End: 2020-07-02

## 2020-07-02 ENCOUNTER — APPOINTMENT (OUTPATIENT)
Dept: OTHER | Facility: HOSPITAL | Age: 25
End: 2020-07-02

## 2020-07-02 DIAGNOSIS — I26.99 ACUTE PULMONARY EMBOLISM WITHOUT ACUTE COR PULMONALE, UNSPECIFIED PULMONARY EMBOLISM TYPE (HCC): ICD-10-CM

## 2020-07-02 DIAGNOSIS — D50.9 IRON DEFICIENCY ANEMIA DURING PREGNANCY: ICD-10-CM

## 2020-07-02 DIAGNOSIS — O99.019 IRON DEFICIENCY ANEMIA DURING PREGNANCY: ICD-10-CM

## 2020-07-02 LAB
APTT PPP: 32.1 SECONDS (ref 22.7–35.4)
BASOPHILS # BLD AUTO: 0.03 10*3/MM3 (ref 0–0.2)
BASOPHILS NFR BLD AUTO: 0.4 % (ref 0–1.5)
DEPRECATED RDW RBC AUTO: 43.1 FL (ref 37–54)
EOSINOPHIL # BLD AUTO: 0.06 10*3/MM3 (ref 0–0.4)
EOSINOPHIL NFR BLD AUTO: 0.7 % (ref 0.3–6.2)
ERYTHROCYTE [DISTWIDTH] IN BLOOD BY AUTOMATED COUNT: 13.5 % (ref 12.3–15.4)
HCT VFR BLD AUTO: 36 % (ref 34–46.6)
HGB BLD-MCNC: 11.9 G/DL (ref 12–15.9)
IMM GRANULOCYTES # BLD AUTO: 0.1 10*3/MM3 (ref 0–0.05)
IMM GRANULOCYTES NFR BLD AUTO: 1.2 % (ref 0–0.5)
LYMPHOCYTES # BLD AUTO: 1.86 10*3/MM3 (ref 0.7–3.1)
LYMPHOCYTES NFR BLD AUTO: 21.8 % (ref 19.6–45.3)
MCH RBC QN AUTO: 29.1 PG (ref 26.6–33)
MCHC RBC AUTO-ENTMCNC: 33.1 G/DL (ref 31.5–35.7)
MCV RBC AUTO: 88 FL (ref 79–97)
MONOCYTES # BLD AUTO: 0.74 10*3/MM3 (ref 0.1–0.9)
MONOCYTES NFR BLD AUTO: 8.7 % (ref 5–12)
NEUTROPHILS NFR BLD AUTO: 5.75 10*3/MM3 (ref 1.7–7)
NEUTROPHILS NFR BLD AUTO: 67.2 % (ref 42.7–76)
NRBC BLD AUTO-RTO: 0 /100 WBC (ref 0–0.2)
PLATELET # BLD AUTO: 211 10*3/MM3 (ref 140–450)
PMV BLD AUTO: 9.8 FL (ref 6–12)
RBC # BLD AUTO: 4.09 10*6/MM3 (ref 3.77–5.28)
WBC # BLD AUTO: 8.54 10*3/MM3 (ref 3.4–10.8)

## 2020-07-02 PROCEDURE — 36415 COLL VENOUS BLD VENIPUNCTURE: CPT

## 2020-07-02 PROCEDURE — 85025 COMPLETE CBC W/AUTO DIFF WBC: CPT | Performed by: INTERNAL MEDICINE

## 2020-07-02 PROCEDURE — 85730 THROMBOPLASTIN TIME PARTIAL: CPT | Performed by: INTERNAL MEDICINE

## 2020-07-04 ENCOUNTER — APPOINTMENT (OUTPATIENT)
Dept: ONCOLOGY | Facility: HOSPITAL | Age: 25
End: 2020-07-04

## 2020-07-05 ENCOUNTER — INFUSION (OUTPATIENT)
Dept: ONCOLOGY | Facility: HOSPITAL | Age: 25
End: 2020-07-05

## 2020-07-05 VITALS
DIASTOLIC BLOOD PRESSURE: 70 MMHG | TEMPERATURE: 96.9 F | SYSTOLIC BLOOD PRESSURE: 109 MMHG | RESPIRATION RATE: 18 BRPM | OXYGEN SATURATION: 94 % | HEART RATE: 80 BPM

## 2020-07-05 DIAGNOSIS — T45.4X5A ADVERSE EFFECT OF IRON AND ITS COMPOUNDS, INITIAL ENCOUNTER: Primary | ICD-10-CM

## 2020-07-05 DIAGNOSIS — D50.9 IRON DEFICIENCY ANEMIA DURING PREGNANCY: ICD-10-CM

## 2020-07-05 DIAGNOSIS — O99.019 IRON DEFICIENCY ANEMIA DURING PREGNANCY: ICD-10-CM

## 2020-07-05 PROCEDURE — 96361 HYDRATE IV INFUSION ADD-ON: CPT

## 2020-07-05 PROCEDURE — 96360 HYDRATION IV INFUSION INIT: CPT

## 2020-07-05 PROCEDURE — 96365 THER/PROPH/DIAG IV INF INIT: CPT

## 2020-07-05 PROCEDURE — 25010000002 IRON SUCROSE PER 1 MG: Performed by: INTERNAL MEDICINE

## 2020-07-05 PROCEDURE — 96366 THER/PROPH/DIAG IV INF ADDON: CPT

## 2020-07-05 RX ORDER — DIPHENHYDRAMINE HYDROCHLORIDE 50 MG/ML
25 INJECTION INTRAMUSCULAR; INTRAVENOUS ONCE
Status: DISCONTINUED | OUTPATIENT
Start: 2020-07-05 | End: 2020-07-05 | Stop reason: HOSPADM

## 2020-07-05 RX ORDER — SODIUM CHLORIDE 9 MG/ML
250 INJECTION, SOLUTION INTRAVENOUS ONCE
Status: COMPLETED | OUTPATIENT
Start: 2020-07-05 | End: 2020-07-05

## 2020-07-05 RX ORDER — ACETAMINOPHEN 325 MG/1
650 TABLET ORAL ONCE
Status: COMPLETED | OUTPATIENT
Start: 2020-07-05 | End: 2020-07-05

## 2020-07-05 RX ADMIN — SODIUM CHLORIDE 250 ML: 9 INJECTION, SOLUTION INTRAVENOUS at 08:29

## 2020-07-05 RX ADMIN — IRON SUCROSE 300 MG: 20 INJECTION, SOLUTION INTRAVENOUS at 10:18

## 2020-07-05 RX ADMIN — ACETAMINOPHEN 650 MG: 325 TABLET, FILM COATED ORAL at 10:18

## 2020-07-06 ENCOUNTER — TELEPHONE (OUTPATIENT)
Dept: ONCOLOGY | Facility: CLINIC | Age: 25
End: 2020-07-06

## 2020-07-06 NOTE — TELEPHONE ENCOUNTER
Patient has an infusion appt for tomorrow and wants to know if she can go to the hospital on Saturday or Sunday  and have the infusion done.       Patient was suppose to have someone to call her about getting labs drawn      Patient phone # [phone]

## 2020-07-07 ENCOUNTER — OFFICE VISIT (OUTPATIENT)
Dept: ONCOLOGY | Facility: CLINIC | Age: 25
End: 2020-07-07

## 2020-07-07 ENCOUNTER — APPOINTMENT (OUTPATIENT)
Dept: ONCOLOGY | Facility: HOSPITAL | Age: 25
End: 2020-07-07

## 2020-07-07 ENCOUNTER — PROCEDURE VISIT (OUTPATIENT)
Dept: OBSTETRICS AND GYNECOLOGY | Age: 25
End: 2020-07-07

## 2020-07-07 ENCOUNTER — ROUTINE PRENATAL (OUTPATIENT)
Dept: OBSTETRICS AND GYNECOLOGY | Age: 25
End: 2020-07-07

## 2020-07-07 ENCOUNTER — LAB (OUTPATIENT)
Dept: LAB | Facility: HOSPITAL | Age: 25
End: 2020-07-07

## 2020-07-07 VITALS
RESPIRATION RATE: 16 BRPM | OXYGEN SATURATION: 98 % | TEMPERATURE: 98 F | DIASTOLIC BLOOD PRESSURE: 70 MMHG | SYSTOLIC BLOOD PRESSURE: 105 MMHG | BODY MASS INDEX: 38.91 KG/M2 | HEART RATE: 105 BPM | HEIGHT: 60 IN | WEIGHT: 198.2 LBS

## 2020-07-07 VITALS — DIASTOLIC BLOOD PRESSURE: 60 MMHG | SYSTOLIC BLOOD PRESSURE: 100 MMHG | BODY MASS INDEX: 38.47 KG/M2 | WEIGHT: 197 LBS

## 2020-07-07 DIAGNOSIS — O99.019 IRON DEFICIENCY ANEMIA DURING PREGNANCY: Primary | ICD-10-CM

## 2020-07-07 DIAGNOSIS — Z30.2 REQUEST FOR STERILIZATION: ICD-10-CM

## 2020-07-07 DIAGNOSIS — O88.219 MATERNAL PULMONARY EMBOLUS (PE), CURRENT PREGNANCY: Primary | ICD-10-CM

## 2020-07-07 DIAGNOSIS — T45.4X5A ADVERSE EFFECT OF IRON AND ITS COMPOUNDS, INITIAL ENCOUNTER: ICD-10-CM

## 2020-07-07 DIAGNOSIS — D50.9 IRON DEFICIENCY ANEMIA DURING PREGNANCY: Primary | ICD-10-CM

## 2020-07-07 DIAGNOSIS — O88.219 MATERNAL PULMONARY EMBOLUS (PE), CURRENT PREGNANCY: ICD-10-CM

## 2020-07-07 DIAGNOSIS — Z98.891 HISTORY OF CESAREAN DELIVERY: ICD-10-CM

## 2020-07-07 DIAGNOSIS — Z79.01 ANTICOAGULATED ON HEPARIN: ICD-10-CM

## 2020-07-07 DIAGNOSIS — Z3A.37 37 WEEKS GESTATION OF PREGNANCY: Primary | ICD-10-CM

## 2020-07-07 DIAGNOSIS — I26.99 ACUTE PULMONARY EMBOLISM WITHOUT ACUTE COR PULMONALE, UNSPECIFIED PULMONARY EMBOLISM TYPE (HCC): ICD-10-CM

## 2020-07-07 DIAGNOSIS — D68.62 LUPUS ANTICOAGULANT WITH HYPERCOAGULABLE STATE (HCC): ICD-10-CM

## 2020-07-07 LAB
APTT PPP: 34.2 SECONDS (ref 22.7–35.4)
BASOPHILS # BLD AUTO: 0.02 10*3/MM3 (ref 0–0.2)
BASOPHILS NFR BLD AUTO: 0.2 % (ref 0–1.5)
CLARITY, POC: CLEAR
COLOR UR: YELLOW
DEPRECATED RDW RBC AUTO: 44.6 FL (ref 37–54)
EOSINOPHIL # BLD AUTO: 0.08 10*3/MM3 (ref 0–0.4)
EOSINOPHIL NFR BLD AUTO: 0.9 % (ref 0.3–6.2)
ERYTHROCYTE [DISTWIDTH] IN BLOOD BY AUTOMATED COUNT: 13.7 % (ref 12.3–15.4)
GLUCOSE UR STRIP-MCNC: NEGATIVE MG/DL
HCT VFR BLD AUTO: 35.7 % (ref 34–46.6)
HGB BLD-MCNC: 11.9 G/DL (ref 12–15.9)
IMM GRANULOCYTES # BLD AUTO: 0.26 10*3/MM3 (ref 0–0.05)
IMM GRANULOCYTES NFR BLD AUTO: 2.9 % (ref 0–0.5)
LYMPHOCYTES # BLD AUTO: 2.1 10*3/MM3 (ref 0.7–3.1)
LYMPHOCYTES NFR BLD AUTO: 23.6 % (ref 19.6–45.3)
MCH RBC QN AUTO: 29.7 PG (ref 26.6–33)
MCHC RBC AUTO-ENTMCNC: 33.3 G/DL (ref 31.5–35.7)
MCV RBC AUTO: 89 FL (ref 79–97)
MONOCYTES # BLD AUTO: 0.74 10*3/MM3 (ref 0.1–0.9)
MONOCYTES NFR BLD AUTO: 8.3 % (ref 5–12)
NEUTROPHILS NFR BLD AUTO: 5.68 10*3/MM3 (ref 1.7–7)
NEUTROPHILS NFR BLD AUTO: 64.1 % (ref 42.7–76)
NRBC BLD AUTO-RTO: 0 /100 WBC (ref 0–0.2)
PLATELET # BLD AUTO: 206 10*3/MM3 (ref 140–450)
PMV BLD AUTO: 9.6 FL (ref 6–12)
PROT UR STRIP-MCNC: NEGATIVE MG/DL
RBC # BLD AUTO: 4.01 10*6/MM3 (ref 3.77–5.28)
WBC # BLD AUTO: 8.88 10*3/MM3 (ref 3.4–10.8)

## 2020-07-07 PROCEDURE — 99212 OFFICE O/P EST SF 10 MIN: CPT | Performed by: OBSTETRICS & GYNECOLOGY

## 2020-07-07 PROCEDURE — 36415 COLL VENOUS BLD VENIPUNCTURE: CPT

## 2020-07-07 PROCEDURE — 76819 FETAL BIOPHYS PROFIL W/O NST: CPT | Performed by: OBSTETRICS & GYNECOLOGY

## 2020-07-07 PROCEDURE — 85730 THROMBOPLASTIN TIME PARTIAL: CPT | Performed by: NURSE PRACTITIONER

## 2020-07-07 PROCEDURE — 85025 COMPLETE CBC W/AUTO DIFF WBC: CPT

## 2020-07-07 PROCEDURE — 99215 OFFICE O/P EST HI 40 MIN: CPT | Performed by: INTERNAL MEDICINE

## 2020-07-07 NOTE — PROGRESS NOTES
.     REASONS FOR FOLLOW-UP:     1. Pulmonary emboli involving the right main pulmonary artery during her pregnancy at 21 weeks.  Patient was started on Lovenox injection 80 mg q.12 h. .  · Hypercoagulable workup without protein S or protein C profile on 3/16/2020 showed positive lupus anticoagulant and a marginally low Antithrombin III activity 82%.   · Patient was started on self injection of unfractionated heparin 20,000 units every 12 hours starting 6/29/2020.  2.  Anemia, unable to tolerate oral iron treatment once a day because of constipation.    · Patient received 3 doses of IV Venofer 300 mg each on 3/25/2020, 4/1/2020, and 4/8/2020.  · Recurrent iron deficiency anemia again on 6/23/2020.  Patient was started on Venofer 300 mg on 7/5/2020.    HISTORY OF PRESENT ILLNESS:  The patient is a 25 y.o. female with the above-mentioned history who presents today for 2 week follow-up and lab review.     Patient is present here with her daughter. She states that she is continues Heparin 20,000 units subcu injection every 8 hours. She denies any significant bleeding from the Heparin. She states that there is some bruising on the abdomen but that is the only site.     She originally started heparin on 6/29/2020, at 10,000 units every 12 hours due to misreading of labeling.  She changed up to 20,000 units every 12 hours on 6/30/2020.  Lab studies on 7/2/2020 reported a PTT 32.1 seconds.  We increased her heparin to 20,000 units every 8 hours.  Patient reports mild bruising at the site of heparin injection, but he denies spontaneous bleeding such as gum bleeding, epistaxis, hematuria or hematochezia.      Today on 7/7/2020, her PTT is 34.2 seconds at 10:18 AM.  Patient reports she had heparin injection this morning around 7 AM.     Patient was found to having recurrent iron deficiency with ferritin 14.2 and iron saturation 8% on 6/23/2020 despite normal hemoglobin 12.5.  She recently received her first dose of Venofer  300 mg on 2020 at the [facility].  She requests to be done on the weekend so her children will be taken care of by family members.      Laboratory study today on 2020 reported hemoglobin 11.9, platelets 206,000 and WBC 8880 including ANC 5680.     Past Medical History:   Diagnosis Date   • Abnormal Pap smear of cervix    • Chlamydia     at age 14 ,   • H/O Depressive disorder    • Herpes     last outbreak    • History of anxiety    • History of urinary tract infection    • Pulmonary embolism (CMS/HCC) 2020    during pregnancy     Past Surgical History:   Procedure Laterality Date   •  SECTION  , , 2015    x3      ONCOLOGIC/HEMATOLOGIC HISTORY: (History from previous dates can be found in the separate document.)   The patient is a 25 y.o. female who presented for initial evaluation on 3/16/2020, referred by her GYN specialist, Dr. Mittal, because of newly diagnosed pulmonary emboli.     Patient reports she had episodes of flu back in 2020. She had lingering issues with some dyspnea. Patient also reported she had tachycardia associated with her shortness of breath. She was seen by Cardiology service and thought related to her pregnancy.  She did have worsening problem and eventually had CT angiogram study on 2020 when she was evaluated in the emergency room and admitted to the hospital for worsening dyspnea. This study reported several small pulmonary emboli in the adjacent branches of the right main pulmonary artery. There were no lung lesions. The upper abdomen showed unremarkable liver, spleen and adrenal glands. Patient was started on Lovenox every 12 hours anticoagulation.     Patient reports bruising at the site of Lovenox injection. She currently uses 80 mg q.12 h. She reports no spontaneous epistaxis or gum bleeding or any other bleeding problem.     As of 3/16/2020, patient was 22 and 1/2 weeks pregnant.  She reports no previous history of thrombophilia.   She had previous 3 C-sections with successful pregnancies. She reports previous use of birth control pills for about 3 years without problem.  She also has no family history for thrombophilia.     Patient recently was started on oral iron treatment during her hospitalization in early 3/2020.  This patient had normal laboratory study, CBC on 11/22/2019 with hemoglobin 12.9, MCV 87 and platelets 394,000, WBC 6600.  Her labs on 02/03/2020 reported hemoglobin 12.1, MCV 88.8, platelets 294,000 and WBC 8550 including ANC 5630, lymphocytes 2170.  She had normal TSH 1.66 that day. On 03/07/2020, she had hemoglobin 11.7.  On 03/08/2020, her hemoglobin was 10.7 with MCV 88.7, MCHC 34.2.  Normal platelets and WBC.     Patient reports she was not able to tolerate oral iron once a day. She has not been taking iron for the past few days. She reports constipation secondary to oral iron treatment. Patient also reports previously she was given oral iron treatment during her 1st pregnancy and she did not tolerate it.     Laboratory study on 3/16/2020 reported hemoglobin 11.3, MCV 87.7, MCHC 34.5. Platelets 248,000 and WBC 9300 including ANC 5920, lymphocytes 2360.  Iron saturation 9% on 3/16/2020, and clearly patient has iron deficiency.      Hypercoagulable workup, without protein S or protein C profile, on 3/16/2020 showed positive for lupus anticoagulant, however negative for anticardiolipin antibodies, anti-beta-2 glycoprotein 1 antibodies, and a negative for antiphospholipid antibody panel #2.  She had a marginal Antithrombin activity 82%, negative for factor II prothrombin mutation and negative for factor V Leiden mutation.     Patient received 3 doses of IV Venofer 300 mg each on 3/25/2020, 4/1/2020, and 4/8/2020 with good tolerance.  She reports that her energy level moderately improved with the IV iron therapy, and she no longer has pica for ice chips.    The patient presented to the [facility] ER on 3/27/2020  complaining of progressive shortness of breath over the prior week.  She was placed on a pulse/oximeter and was noted to have a pulse of 70-80 with O2 saturation of % during her stay in the ED.  Her chest exam was normal, and her laboratory studies showed hemoglobin 10.7.  She was discharged home under stable conditions and was advised to follow up with Dr. Mittal, her Ob/Gyn, for further evaluation.    On 3/20/2020, Dr. Hinds, at [facility], recommended changing the patient to heparin at approximately 35 weeks pregnancy, rather than continuing lovenox.     Laboratory studies on 2020 showed normalized hemoglobin 12.2, and improved iron saturation 24%, ferritin 33.1, free iron 126 and a TIBC 528.  Maintains normal WBC 9380, and platelets 211,000.     On 2020 she presented today for 4-week re-evaluation with laboratory review.    Patient is currently 35 and a half weeks pregnant.  She continues to use Lovenox injections 80 mg q.12 h.  Patient reports bruising at the site of Lovenox injection, but she reports no spontaneous epistaxis or gum bleeding or any other bleeding problem.  She continues to follow up with Dr. Mittal, and is scheduled to have her  section done on 2020.  Patient has had 3 prior  sections done.      She is not taking oral iron at this time and was previously unable to tolerate oral iron treatment once a day due to nausea or constipation.  She reports that she has only tried supplemental iron while she was previously pregnant.    Patient currently has rapid pulse of 108 and her O2 saturation is 98% with /73.     Laboratory studies from, 2020, showed normal hemoglobin 12.5 MCV 93.2, platelets 227,000 and WBC 10,200 including ANC 7280 lymphocytes 2000.  Iron studies pending.       MEDICATIONS    Current Outpatient Medications:   •  albuterol sulfate  (90 Base) MCG/ACT inhaler, Inhale 2 puffs Every 4 (Four) Hours As Needed for Wheezing., Disp: , Rfl:   •   "Heparin Sodium, Porcine, (HEPARIN, PORCINE,) 02485 UNIT/ML injection, Infuse 1 mL into a venous catheter 2 (two) times a day. Adjust as directed, Disp: 60 mL, Rfl: 1  •  Needle, Disp, 30G X 1/2\" misc, 0.5 mL 2 (Two) Times a Day., Disp: 50 each, Rfl: 0  •  ondansetron ODT (ZOFRAN-ODT) 4 MG disintegrating tablet, ONE EVERY 8 HOURS AS NEEDED FOR NAUSEA, Disp: , Rfl:   •  pantoprazole (Protonix) 40 MG EC tablet, Take 1 tablet by mouth Daily., Disp: 30 tablet, Rfl: 0  •  Prenatal Vit-Fe Fumarate-FA (PRENATAL, CLASSIC, VITAMIN) 28-0.8 MG tablet tablet, Take 1 tablet by mouth Daily., Disp: , Rfl:     ALLERGIES:   No Known Allergies    SOCIAL HISTORY:       Social History     Socioeconomic History   • Marital status: Single     Spouse name: Not on file   • Number of children: 3   • Years of education: Not on file   • Highest education level: Not on file   Occupational History   • Occupation: Nurse     Employer: [employer]   Tobacco Use   • Smoking status: Never Smoker   • Smokeless tobacco: Never Used   Substance and Sexual Activity   • Alcohol use: Not Currently   • Drug use: No   • Sexual activity: Yes     Partners: Male   · Patient is a rare social drinker.        FAMILY HISTORY:  Family History   Problem Relation Age of Onset   • Diabetes Maternal Uncle    • Breast cancer Paternal Grandmother    • Breast cancer Paternal Aunt    • Breast cancer Paternal Aunt    • Thyroid disease Mother    • Birth defects Neg Hx    · No hematology disorder in the family, including thrombophilia.    REVIEW OF SYSTEMS:  Review of Systems   Constitutional: Negative for appetite change, fatigue and unexpected weight change.        Resolution of pica for ice chips   HENT: Negative for congestion, mouth sores and nosebleeds.         No gum bleeding   Eyes: Negative for photophobia and visual disturbance.   Respiratory: Positive for shortness of breath (Mild exertional dyspnea).    Cardiovascular: Negative for chest pain, " "palpitations and leg swelling.   Gastrointestinal: Negative for abdominal pain, blood in stool and nausea.   Endocrine: Negative for cold intolerance and polydipsia.   Genitourinary: Negative for dysuria, hematuria, menstrual problem, vaginal bleeding and vaginal discharge.   Musculoskeletal: Negative for back pain and joint swelling.   Skin: Negative for color change and rash.   Allergic/Immunologic: Positive for immunocompromised state (Patient is pregnant ). Negative for environmental allergies.   Neurological: Negative for dizziness, syncope, numbness and headaches.   Hematological: Negative for adenopathy. Bruises/bleeds easily (At the site of heparin injection).   Psychiatric/Behavioral: Negative for agitation and confusion.            Vitals:    20 1028   BP: 105/70   Pulse: 105   Resp: 16   Temp: 98 °F (36.7 °C)   TempSrc: Oral   SpO2: 98%   Weight: 89.9 kg (198 lb 3.2 oz)  Comment: pt is pregnant   Height: 152.4 cm (60\")   PainSc: 0-No pain     Current Status 2020   ECOG score 0       PHYSICAL EXAM:      GENERAL:  Well-developed, well-nourished pregnant woman, in no acute distress.   SKIN:  Warm, dry without rashes, purpura or petechiae.  HEAD:  Normocephalic.  EYES:  Pupils equal, round and reactive to light.  EOMs intact.  Conjunctivae normal.  EARS:  Hearing intact.  NOSE:  Patient wears mask due to the pandemic coronavirus infection. .  MOUTH: Same as above.  THROAT: Same as above.  NECK:  Supple; no thyromegaly or masses, no JVD.  LYMPHATICS:  No cervical, supraclavicular adenopathy.  CHEST: Normal respiratory effort.  Lungs clear to auscultation. Good airflow.  CARDIAC:  Regular rate and rhythm without murmurs, rubs or gallops. Normal S1,S2.  ABDOMEN:  Soft, nontender with no organomegaly or masses.  : Patient is regnant. Due to have  on 2020.   EXTREMITIES:  No clubbing, cyanosis or edema.   NEUROLOGICAL:  Cranial Nerves II-XII grossly intact.  No focal neurological " deficits.  PSYCHIATRIC:  Normal affect and mood.        RECENT LABS:  Lab Results   Component Value Date    WBC 8.88 07/07/2020    HGB 11.9 (L) 07/07/2020    HCT 35.7 07/07/2020    MCV 89.0 07/07/2020     07/07/2020     Lab Results   Component Value Date    NEUTROABS 5.68 07/07/2020     Lab Results   Component Value Date    IRON 46 06/23/2020    TIBC 557 (H) 06/23/2020    FERRITIN 14.20 06/23/2020   Iron saturation 8% on 6/23/2020.        Assessment/Plan      ASSESSMENT:   1. Pulmonary emboli involving the right main pulmonary artery during her pregnancy at 21 weeks, secondary hypercoagulable status positive for lupus anticoagulant.    · I agree for the patient to continue Lovenox injection 80 mg q.12 h.   · Nevertheless, I recommended to have laboratory studies for hypercoagulable status for antiphospholipid syndrome and also we will check both factor II and factor V Leiden gene mutations together with antithrombin III. I advised against protein C and protein S study since she is pregnant and those levels would be artificially low. I recommended to test those in the future, 2 months after her delivery.   · I discussed with the patient on 3/16/2020 for the length of treatment, since she had a small pulmonary emboli, if her laboratory study was benign then she could be treated for 6 months and then stop.  If she has positive antiphospholipid antibodies then this needs to be reevaluated after 6 months anticoagulation.   · Hypercoagulable workup without protein S or protein C profile on 3/16/2020 showed positive for lupus anticoagulant, but negative for antiphospholipid antibodies including panel #2.  I discussed with the patient that lupus anticoagulant could be related to her pregnancy, this will need to be repeated in the future after childbirth.  If she has persistent lupus anticoagulant, she will likely require life-long anticoagulation due to her hypercoagulable status.  However, these positive results may  be due to her current pregnancy, and it may disappear after childbirth, so these studies should be repeated in the future when she is not pregnant.  · Patient continues on Lovenox injection 80 mg q.12 h.  Dr. Hinds, at [facility], recently recommended changing the patient to heparin at approximately 35 weeks pregnancy, rather than continuing lovenox.  I discussed with the patient on 2020 that I agree with Dr. Hinds, and we will plan to switch her from Lovenox injections to Heparin twice a day when she is 35 weeks pregnant.  · Patient is currently 35 and a half weeks pregnant 2020.  We will switch her from Lovenox injections to Heparin twice a day at this time.  She will continue to follow up with her Ob/Gyn, Dr. Mittal, and is scheduled to have a  section done on 2020.    · Patient was started on heparin on 2020 and every 12 hours.  Dose was increased at 20,000 units every 8 hours 2020.   · On 2020, her PTT is still at 34.2 seconds.  She has been tolerating treatment, with only mild bruising at the site of injection.  Because patient is already on high dose of heparin, will continue the same, without further escalating dose.     2.  Iron deficiency anemia.    · Unable to tolerate oral iron treatment once a day because of constipation. I looked at her laboratory results.  There was no documented iron study.  I recommended to obtain a baseline on 3/16/2020.  · Laboratory studies on 3/16/2020 showed iron saturation 9%, and the ferritin 12.2.  She clearly has iron deficiency.  I recommended intravenous iron therapy with Venofer 300 mg weekly for 3 doses and patient is agreeable.   · Patient received 3 doses of IV Venofer 300 mg each on 3/25/2020, 2020, and 2020.  Patient reports resolution of pica for ice chips.  · Laboratory studies 2020 showed normalized hemoglobin 12.2, with normalized iron saturation 24% and slightly improved ferritin level 33.1 ng/mL.  Highly suspect  she will become iron deficiency again.  · Laboratory studies, 2020, show normal hemoglobin 12.5.  Iron studies reported severe deficiency with ferritin 14.2, free iron 46 TIBC 557 and iron saturation 8%.  Patient was started on IV Venofer on 2020, 300 mg for 2 doses.  · She is not taking oral iron at this time and was previously unable to tolerate oral iron treatment once a day due to nausea or constipation.  Patient reports that she has only tried supplemental iron during her prior pregnancies.  I advised the patient that she should start taking oral iron after she delivers.  The patient verbalized understanding, had all questions answered, and agreed with the plan.      PLAN:     1. Continue Heparin 20,000 units subcu every 8 hours at this time.     2. Patient will not be able receive her Venofer injection today due to time conflicts with her next MD appointment in couple hours. She will be rescheduled for this coming  on 2020 at the [facility], so that her kids could be watched by family members.  3. We will check anti-Xa on , 2020 before she receives Venofer treatment.  4. Continue follow up with her Ob-Gyn, Dr. Mittal.    5. After she has the , we will most likely switch her back to Lovenox.  6. Follow-up with me in 1 month with labs- CBC, ferritin and iron profile.       By signing my name here, I Jake King, attest that all documentation on 20 at 17:05 has been prepared under the direction and in the presence of Dr. Ana Ho MD.    I reviewed the note scribed by Jake Malik and made appropriate corrections.    I discussed with my colleague Dr. Iverson today for the management of anticoagulation.      ANA HO M.D., Ph.D.    2020     Addendum:  Dr. Mittal called and spoke with me today, discussed further management of anticoagulation.  Because this will be the fourth  for this patient, carries high risk for  bleeding, I recommended restarting anticoagulation 12-24 hours after  pending her bleeding situation, with low-dose heparin 10,000 units every 12 hours for 24 to 48 hours, and if no significant bleeding, she could be put back on full dose Lovenox anticoagulation.  We will be happy to see her as consult during her hospitalization.        ANA JETER M.D., Ph.D.    2020        CC:  Noemi Mittal MD

## 2020-07-07 NOTE — PROGRESS NOTES
Pt presents for f/u visit and BPP. She denies regular ctx, vag bleeding or leaking fluid  She notes active fetal movement  She denies shortness of air or chest pain  O: BPP 8/8, antonella normal at 15  Prolonged auscultation of fetal heart tones performed and regular rhythm noted  A/p: reassuring BPP today, pt has repeat c/s with BTI scheduled next week. She has been advised to stop her heparin 24 hours prior to c/s by [facility] and will need to re-start anticoagulation postpartum. She is being following by hematologist. Her current dose of heparin is 20,000 units 3 times daily. She is also doing another course of iron infusions. Reviewed preop instructions with pt. She will hold heparin with any labor symptoms. Advised continue daily fmc's and labor warnings    Fetal heart rate was normal today, pt had normal fetal echo with M

## 2020-07-12 ENCOUNTER — INFUSION (OUTPATIENT)
Dept: ONCOLOGY | Facility: HOSPITAL | Age: 25
End: 2020-07-12

## 2020-07-12 VITALS
TEMPERATURE: 97 F | SYSTOLIC BLOOD PRESSURE: 126 MMHG | RESPIRATION RATE: 16 BRPM | DIASTOLIC BLOOD PRESSURE: 80 MMHG | HEART RATE: 74 BPM

## 2020-07-12 DIAGNOSIS — I26.99 ACUTE PULMONARY EMBOLISM WITHOUT ACUTE COR PULMONALE, UNSPECIFIED PULMONARY EMBOLISM TYPE (HCC): ICD-10-CM

## 2020-07-12 DIAGNOSIS — Z79.01 ANTICOAGULATED ON HEPARIN: ICD-10-CM

## 2020-07-12 PROCEDURE — 96365 THER/PROPH/DIAG IV INF INIT: CPT

## 2020-07-12 PROCEDURE — 85220 BLOOC CLOT FACTOR V TEST: CPT

## 2020-07-12 PROCEDURE — 25010000002 IRON SUCROSE PER 1 MG: Performed by: INTERNAL MEDICINE

## 2020-07-12 PROCEDURE — 96366 THER/PROPH/DIAG IV INF ADDON: CPT

## 2020-07-12 RX ORDER — ACETAMINOPHEN 325 MG/1
650 TABLET ORAL EVERY 6 HOURS PRN
Status: DISCONTINUED | OUTPATIENT
Start: 2020-07-12 | End: 2020-07-17 | Stop reason: HOSPADM

## 2020-07-12 RX ORDER — SODIUM CHLORIDE 9 MG/ML
20 INJECTION, SOLUTION INTRAVENOUS ONCE
Status: DISCONTINUED | OUTPATIENT
Start: 2020-07-12 | End: 2020-07-17 | Stop reason: HOSPADM

## 2020-07-12 RX ORDER — DIPHENHYDRAMINE HYDROCHLORIDE 50 MG/ML
25 INJECTION INTRAMUSCULAR; INTRAVENOUS ONCE
Status: DISCONTINUED | OUTPATIENT
Start: 2020-07-12 | End: 2020-07-17 | Stop reason: HOSPADM

## 2020-07-12 RX ADMIN — IRON SUCROSE 300 MG: 20 INJECTION, SOLUTION INTRAVENOUS at 09:23

## 2020-07-12 RX ADMIN — ACETAMINOPHEN 650 MG: 325 TABLET, FILM COATED ORAL at 09:22

## 2020-07-13 ENCOUNTER — PREP FOR SURGERY (OUTPATIENT)
Dept: OTHER | Facility: HOSPITAL | Age: 25
End: 2020-07-13

## 2020-07-13 DIAGNOSIS — Z98.891 HISTORY OF CESAREAN DELIVERY: ICD-10-CM

## 2020-07-13 DIAGNOSIS — Z30.2 REQUEST FOR STERILIZATION: ICD-10-CM

## 2020-07-13 DIAGNOSIS — I26.99 ACUTE PULMONARY EMBOLISM WITHOUT ACUTE COR PULMONALE, UNSPECIFIED PULMONARY EMBOLISM TYPE (HCC): Primary | ICD-10-CM

## 2020-07-13 RX ORDER — CEFAZOLIN SODIUM 2 G/100ML
2 INJECTION, SOLUTION INTRAVENOUS ONCE
Status: CANCELLED | OUTPATIENT
Start: 2020-07-13 | End: 2020-07-13

## 2020-07-13 RX ORDER — SODIUM CHLORIDE 0.9 % (FLUSH) 0.9 %
10 SYRINGE (ML) INJECTION AS NEEDED
Status: CANCELLED | OUTPATIENT
Start: 2020-07-13

## 2020-07-13 RX ORDER — METHYLERGONOVINE MALEATE 0.2 MG/ML
200 INJECTION INTRAVENOUS ONCE AS NEEDED
Status: CANCELLED | OUTPATIENT
Start: 2020-07-13

## 2020-07-13 RX ORDER — OXYTOCIN-SODIUM CHLORIDE 0.9% IV SOLN 30 UNIT/500ML 30-0.9/5 UT/ML-%
999 SOLUTION INTRAVENOUS ONCE
Status: CANCELLED | OUTPATIENT
Start: 2020-07-13 | End: 2020-07-13

## 2020-07-13 RX ORDER — MISOPROSTOL 200 UG/1
800 TABLET ORAL AS NEEDED
Status: CANCELLED | OUTPATIENT
Start: 2020-07-13

## 2020-07-13 RX ORDER — SODIUM CHLORIDE, SODIUM LACTATE, POTASSIUM CHLORIDE, CALCIUM CHLORIDE 600; 310; 30; 20 MG/100ML; MG/100ML; MG/100ML; MG/100ML
125 INJECTION, SOLUTION INTRAVENOUS CONTINUOUS
Status: CANCELLED | OUTPATIENT
Start: 2020-07-13

## 2020-07-13 RX ORDER — SODIUM CHLORIDE 0.9 % (FLUSH) 0.9 %
3 SYRINGE (ML) INJECTION EVERY 12 HOURS SCHEDULED
Status: CANCELLED | OUTPATIENT
Start: 2020-07-13

## 2020-07-13 RX ORDER — OXYTOCIN-SODIUM CHLORIDE 0.9% IV SOLN 30 UNIT/500ML 30-0.9/5 UT/ML-%
250 SOLUTION INTRAVENOUS CONTINUOUS
Status: CANCELLED | OUTPATIENT
Start: 2020-07-13 | End: 2020-07-13

## 2020-07-13 RX ORDER — OXYTOCIN-SODIUM CHLORIDE 0.9% IV SOLN 30 UNIT/500ML 30-0.9/5 UT/ML-%
125 SOLUTION INTRAVENOUS CONTINUOUS PRN
Status: CANCELLED | OUTPATIENT
Start: 2020-07-13

## 2020-07-13 RX ORDER — CARBOPROST TROMETHAMINE 250 UG/ML
250 INJECTION, SOLUTION INTRAMUSCULAR AS NEEDED
Status: CANCELLED | OUTPATIENT
Start: 2020-07-13

## 2020-07-14 ENCOUNTER — ANESTHESIA (OUTPATIENT)
Dept: LABOR AND DELIVERY | Facility: HOSPITAL | Age: 25
End: 2020-07-14

## 2020-07-14 ENCOUNTER — ANESTHESIA EVENT (OUTPATIENT)
Dept: LABOR AND DELIVERY | Facility: HOSPITAL | Age: 25
End: 2020-07-14

## 2020-07-14 ENCOUNTER — HOSPITAL ENCOUNTER (INPATIENT)
Facility: HOSPITAL | Age: 25
LOS: 3 days | Discharge: HOME | End: 2020-07-17
Attending: OBSTETRICS & GYNECOLOGY | Admitting: OBSTETRICS & GYNECOLOGY

## 2020-07-14 DIAGNOSIS — Z98.891 HISTORY OF CESAREAN DELIVERY: ICD-10-CM

## 2020-07-14 PROBLEM — Z30.2 REQUEST FOR STERILIZATION: Status: RESOLVED | Noted: 2020-03-19 | Resolved: 2020-07-14

## 2020-07-14 PROBLEM — I26.99 ACUTE PULMONARY EMBOLISM WITHOUT ACUTE COR PULMONALE (HCC): Status: RESOLVED | Noted: 2020-03-07 | Resolved: 2020-07-14

## 2020-07-14 PROBLEM — O34.219 PREVIOUS CESAREAN DELIVERY AFFECTING PREGNANCY: Status: ACTIVE | Noted: 2020-07-14

## 2020-07-14 LAB
ABO GROUP BLD: NORMAL
ALBUMIN SERPL-MCNC: 3.4 G/DL (ref 3.5–5.2)
ALBUMIN/GLOB SERPL: 1.1 G/DL
ALP SERPL-CCNC: 88 U/L (ref 39–117)
ALT SERPL W P-5'-P-CCNC: 5 U/L (ref 1–33)
ANION GAP SERPL CALCULATED.3IONS-SCNC: 11.9 MMOL/L (ref 5–15)
APTT PPP: 33 SECONDS (ref 22.7–35.4)
AST SERPL-CCNC: 13 U/L (ref 1–32)
BILIRUB SERPL-MCNC: 0.2 MG/DL (ref 0–1.2)
BLD GP AB SCN SERPL QL: NEGATIVE
BUN SERPL-MCNC: 5 MG/DL (ref 6–20)
BUN/CREAT SERPL: 15.6 (ref 7–25)
CALCIUM SPEC-SCNC: 9.6 MG/DL (ref 8.6–10.5)
CHLORIDE SERPL-SCNC: 102 MMOL/L (ref 98–107)
CO2 SERPL-SCNC: 23.1 MMOL/L (ref 22–29)
CREAT SERPL-MCNC: 0.32 MG/DL (ref 0.57–1)
DEPRECATED RDW RBC AUTO: 43.3 FL (ref 37–54)
ERYTHROCYTE [DISTWIDTH] IN BLOOD BY AUTOMATED COUNT: 13.7 % (ref 12.3–15.4)
GFR SERPL CREATININE-BSD FRML MDRD: >150 ML/MIN/1.73
GLOBULIN UR ELPH-MCNC: 3 GM/DL
GLUCOSE SERPL-MCNC: 86 MG/DL (ref 65–99)
HCT VFR BLD AUTO: 36.1 % (ref 34–46.6)
HGB BLD-MCNC: 12.5 G/DL (ref 12–15.9)
INR PPP: 1.01 (ref 0.9–1.1)
MCH RBC QN AUTO: 30.3 PG (ref 26.6–33)
MCHC RBC AUTO-ENTMCNC: 34.6 G/DL (ref 31.5–35.7)
MCV RBC AUTO: 87.6 FL (ref 79–97)
PLATELET # BLD AUTO: 207 10*3/MM3 (ref 140–450)
PMV BLD AUTO: 10 FL (ref 6–12)
POTASSIUM SERPL-SCNC: 3.6 MMOL/L (ref 3.5–5.2)
PROT SERPL-MCNC: 6.4 G/DL (ref 6–8.5)
PROTHROMBIN TIME: 13.2 SECONDS (ref 11.7–14.2)
RBC # BLD AUTO: 4.12 10*6/MM3 (ref 3.77–5.28)
RH BLD: POSITIVE
SODIUM SERPL-SCNC: 137 MMOL/L (ref 136–145)
T&S EXPIRATION DATE: NORMAL
WBC # BLD AUTO: 10.04 10*3/MM3 (ref 3.4–10.8)

## 2020-07-14 PROCEDURE — 25010000002 HEPARIN (PORCINE) PER 1000 UNITS: Performed by: OBSTETRICS & GYNECOLOGY

## 2020-07-14 PROCEDURE — 86850 RBC ANTIBODY SCREEN: CPT | Performed by: OBSTETRICS & GYNECOLOGY

## 2020-07-14 PROCEDURE — 25010000002 PHENYLEPHRINE PER 1 ML: Performed by: NURSE ANESTHETIST, CERTIFIED REGISTERED

## 2020-07-14 PROCEDURE — 99254 IP/OBS CNSLTJ NEW/EST MOD 60: CPT | Performed by: INTERNAL MEDICINE

## 2020-07-14 PROCEDURE — 25010000002 NALBUPHINE PER 10 MG: Performed by: ANESTHESIOLOGY

## 2020-07-14 PROCEDURE — 25010000002 HYDROMORPHONE PER 4 MG: Performed by: NURSE ANESTHETIST, CERTIFIED REGISTERED

## 2020-07-14 PROCEDURE — S0260 H&P FOR SURGERY: HCPCS | Performed by: OBSTETRICS & GYNECOLOGY

## 2020-07-14 PROCEDURE — 85730 THROMBOPLASTIN TIME PARTIAL: CPT | Performed by: OBSTETRICS & GYNECOLOGY

## 2020-07-14 PROCEDURE — 25010000002 MORPHINE PER 10 MG: Performed by: ANESTHESIOLOGY

## 2020-07-14 PROCEDURE — 25010000002 FENTANYL CITRATE (PF) 100 MCG/2ML SOLUTION: Performed by: ANESTHESIOLOGY

## 2020-07-14 PROCEDURE — 86900 BLOOD TYPING SEROLOGIC ABO: CPT | Performed by: OBSTETRICS & GYNECOLOGY

## 2020-07-14 PROCEDURE — 25010000002 DIPHENHYDRAMINE PER 50 MG: Performed by: NURSE ANESTHETIST, CERTIFIED REGISTERED

## 2020-07-14 PROCEDURE — 85610 PROTHROMBIN TIME: CPT | Performed by: OBSTETRICS & GYNECOLOGY

## 2020-07-14 PROCEDURE — 86901 BLOOD TYPING SEROLOGIC RH(D): CPT | Performed by: OBSTETRICS & GYNECOLOGY

## 2020-07-14 PROCEDURE — 25010000003 CEFAZOLIN IN DEXTROSE 2-4 GM/100ML-% SOLUTION: Performed by: OBSTETRICS & GYNECOLOGY

## 2020-07-14 PROCEDURE — 59515 CESAREAN DELIVERY: CPT | Performed by: OBSTETRICS & GYNECOLOGY

## 2020-07-14 PROCEDURE — 59514 CESAREAN DELIVERY ONLY: CPT | Performed by: OBSTETRICS & GYNECOLOGY

## 2020-07-14 PROCEDURE — 25010000002 ONDANSETRON PER 1 MG: Performed by: ANESTHESIOLOGY

## 2020-07-14 PROCEDURE — 85027 COMPLETE CBC AUTOMATED: CPT | Performed by: OBSTETRICS & GYNECOLOGY

## 2020-07-14 PROCEDURE — 80053 COMPREHEN METABOLIC PANEL: CPT | Performed by: OBSTETRICS & GYNECOLOGY

## 2020-07-14 RX ORDER — DIPHENHYDRAMINE HYDROCHLORIDE 50 MG/ML
25 INJECTION INTRAMUSCULAR; INTRAVENOUS EVERY 4 HOURS PRN
Status: DISCONTINUED | OUTPATIENT
Start: 2020-07-14 | End: 2020-07-15

## 2020-07-14 RX ORDER — METHYLERGONOVINE MALEATE 0.2 MG/ML
200 INJECTION INTRAVENOUS ONCE AS NEEDED
Status: DISCONTINUED | OUTPATIENT
Start: 2020-07-14 | End: 2020-07-14 | Stop reason: HOSPADM

## 2020-07-14 RX ORDER — OXYCODONE HYDROCHLORIDE AND ACETAMINOPHEN 5; 325 MG/1; MG/1
1 TABLET ORAL EVERY 4 HOURS PRN
Status: DISCONTINUED | OUTPATIENT
Start: 2020-07-14 | End: 2020-07-17 | Stop reason: HOSPADM

## 2020-07-14 RX ORDER — MISOPROSTOL 200 UG/1
800 TABLET ORAL AS NEEDED
Status: DISCONTINUED | OUTPATIENT
Start: 2020-07-14 | End: 2020-07-14 | Stop reason: HOSPADM

## 2020-07-14 RX ORDER — FAMOTIDINE 20 MG/1
20 TABLET, FILM COATED ORAL 2 TIMES DAILY
COMMUNITY
End: 2020-11-05

## 2020-07-14 RX ORDER — CARBOPROST TROMETHAMINE 250 UG/ML
250 INJECTION, SOLUTION INTRAMUSCULAR AS NEEDED
Status: DISCONTINUED | OUTPATIENT
Start: 2020-07-14 | End: 2020-07-14 | Stop reason: HOSPADM

## 2020-07-14 RX ORDER — DOCUSATE SODIUM 100 MG/1
100 CAPSULE, LIQUID FILLED ORAL 2 TIMES DAILY
Status: DISCONTINUED | OUTPATIENT
Start: 2020-07-14 | End: 2020-07-17 | Stop reason: HOSPADM

## 2020-07-14 RX ORDER — LANOLIN
CREAM (ML) TOPICAL
Status: DISCONTINUED | OUTPATIENT
Start: 2020-07-14 | End: 2020-07-17 | Stop reason: HOSPADM

## 2020-07-14 RX ORDER — HEPARIN SODIUM 5000 [USP'U]/ML
10000 INJECTION, SOLUTION INTRAVENOUS; SUBCUTANEOUS EVERY 12 HOURS SCHEDULED
Status: DISCONTINUED | OUTPATIENT
Start: 2020-07-14 | End: 2020-07-16

## 2020-07-14 RX ORDER — PHYTONADIONE 1 MG/.5ML
INJECTION, EMULSION INTRAMUSCULAR; INTRAVENOUS; SUBCUTANEOUS
Status: DISPENSED
Start: 2020-07-14 | End: 2020-07-14

## 2020-07-14 RX ORDER — SODIUM CHLORIDE, SODIUM LACTATE, POTASSIUM CHLORIDE, CALCIUM CHLORIDE 600; 310; 30; 20 MG/100ML; MG/100ML; MG/100ML; MG/100ML
125 INJECTION, SOLUTION INTRAVENOUS CONTINUOUS
Status: DISCONTINUED | OUTPATIENT
Start: 2020-07-14 | End: 2020-07-14

## 2020-07-14 RX ORDER — ERYTHROMYCIN 5 MG/G
OINTMENT OPHTHALMIC
Status: DISPENSED
Start: 2020-07-14 | End: 2020-07-14

## 2020-07-14 RX ORDER — SODIUM CHLORIDE 0.9 % (FLUSH) 0.9 %
3 SYRINGE (ML) INJECTION EVERY 12 HOURS SCHEDULED
Status: DISCONTINUED | OUTPATIENT
Start: 2020-07-14 | End: 2020-07-14

## 2020-07-14 RX ORDER — ALBUTEROL SULFATE 2.5 MG/3ML
2.5 SOLUTION RESPIRATORY (INHALATION) EVERY 4 HOURS PRN
Status: DISCONTINUED | OUTPATIENT
Start: 2020-07-14 | End: 2020-07-14

## 2020-07-14 RX ORDER — ACETAMINOPHEN 500 MG
1000 TABLET ORAL ONCE
Status: COMPLETED | OUTPATIENT
Start: 2020-07-14 | End: 2020-07-14

## 2020-07-14 RX ORDER — GLYCOPYRROLATE 0.2 MG/ML
INJECTION INTRAMUSCULAR; INTRAVENOUS AS NEEDED
Status: DISCONTINUED | OUTPATIENT
Start: 2020-07-14 | End: 2020-07-14 | Stop reason: SURG

## 2020-07-14 RX ORDER — NALOXONE HCL 0.4 MG/ML
0.2 VIAL (ML) INJECTION
Status: DISCONTINUED | OUTPATIENT
Start: 2020-07-14 | End: 2020-07-17 | Stop reason: HOSPADM

## 2020-07-14 RX ORDER — NALBUPHINE HCL 10 MG/ML
2 AMPUL (ML) INJECTION EVERY 4 HOURS PRN
Status: DISCONTINUED | OUTPATIENT
Start: 2020-07-14 | End: 2020-07-17 | Stop reason: HOSPADM

## 2020-07-14 RX ORDER — HYDROMORPHONE HYDROCHLORIDE 1 MG/ML
0.5 INJECTION, SOLUTION INTRAMUSCULAR; INTRAVENOUS; SUBCUTANEOUS
Status: DISCONTINUED | OUTPATIENT
Start: 2020-07-14 | End: 2020-07-14 | Stop reason: HOSPADM

## 2020-07-14 RX ORDER — OXYTOCIN-SODIUM CHLORIDE 0.9% IV SOLN 30 UNIT/500ML 30-0.9/5 UT/ML-%
125 SOLUTION INTRAVENOUS CONTINUOUS PRN
Status: COMPLETED | OUTPATIENT
Start: 2020-07-14 | End: 2020-07-14

## 2020-07-14 RX ORDER — FENTANYL CITRATE 50 UG/ML
INJECTION, SOLUTION INTRAMUSCULAR; INTRAVENOUS
Status: COMPLETED | OUTPATIENT
Start: 2020-07-14 | End: 2020-07-14

## 2020-07-14 RX ORDER — DIPHENHYDRAMINE HYDROCHLORIDE 50 MG/ML
INJECTION INTRAMUSCULAR; INTRAVENOUS AS NEEDED
Status: DISCONTINUED | OUTPATIENT
Start: 2020-07-14 | End: 2020-07-14 | Stop reason: SURG

## 2020-07-14 RX ORDER — ONDANSETRON 2 MG/ML
4 INJECTION INTRAMUSCULAR; INTRAVENOUS ONCE AS NEEDED
Status: COMPLETED | OUTPATIENT
Start: 2020-07-14 | End: 2020-07-14

## 2020-07-14 RX ORDER — MISOPROSTOL 200 UG/1
600 TABLET ORAL ONCE AS NEEDED
Status: DISCONTINUED | OUTPATIENT
Start: 2020-07-14 | End: 2020-07-17 | Stop reason: HOSPADM

## 2020-07-14 RX ORDER — MORPHINE SULFATE 2 MG/ML
2 INJECTION, SOLUTION INTRAMUSCULAR; INTRAVENOUS
Status: ACTIVE | OUTPATIENT
Start: 2020-07-14 | End: 2020-07-15

## 2020-07-14 RX ORDER — OXYCODONE HYDROCHLORIDE AND ACETAMINOPHEN 5; 325 MG/1; MG/1
2 TABLET ORAL EVERY 4 HOURS PRN
Status: DISCONTINUED | OUTPATIENT
Start: 2020-07-14 | End: 2020-07-17 | Stop reason: HOSPADM

## 2020-07-14 RX ORDER — METHYLERGONOVINE MALEATE 0.2 MG/ML
200 INJECTION INTRAVENOUS ONCE AS NEEDED
Status: DISCONTINUED | OUTPATIENT
Start: 2020-07-14 | End: 2020-07-17 | Stop reason: HOSPADM

## 2020-07-14 RX ORDER — OXYTOCIN-SODIUM CHLORIDE 0.9% IV SOLN 30 UNIT/500ML 30-0.9/5 UT/ML-%
999 SOLUTION INTRAVENOUS ONCE
Status: COMPLETED | OUTPATIENT
Start: 2020-07-14 | End: 2020-07-14

## 2020-07-14 RX ORDER — SODIUM CHLORIDE 0.9 % (FLUSH) 0.9 %
10 SYRINGE (ML) INJECTION AS NEEDED
Status: DISCONTINUED | OUTPATIENT
Start: 2020-07-14 | End: 2020-07-14

## 2020-07-14 RX ORDER — BUPIVACAINE HYDROCHLORIDE 7.5 MG/ML
INJECTION, SOLUTION EPIDURAL; RETROBULBAR
Status: COMPLETED | OUTPATIENT
Start: 2020-07-14 | End: 2020-07-14

## 2020-07-14 RX ORDER — MORPHINE SULFATE 1 MG/ML
INJECTION, SOLUTION EPIDURAL; INTRATHECAL; INTRAVENOUS
Status: COMPLETED | OUTPATIENT
Start: 2020-07-14 | End: 2020-07-14

## 2020-07-14 RX ORDER — DIPHENHYDRAMINE HCL 25 MG
25 CAPSULE ORAL EVERY 4 HOURS PRN
Status: DISCONTINUED | OUTPATIENT
Start: 2020-07-14 | End: 2020-07-15

## 2020-07-14 RX ORDER — FAMOTIDINE 10 MG/ML
20 INJECTION, SOLUTION INTRAVENOUS ONCE AS NEEDED
Status: COMPLETED | OUTPATIENT
Start: 2020-07-14 | End: 2020-07-14

## 2020-07-14 RX ORDER — OXYTOCIN-SODIUM CHLORIDE 0.9% IV SOLN 30 UNIT/500ML 30-0.9/5 UT/ML-%
250 SOLUTION INTRAVENOUS CONTINUOUS
Status: DISPENSED | OUTPATIENT
Start: 2020-07-14 | End: 2020-07-14

## 2020-07-14 RX ORDER — TRISODIUM CITRATE DIHYDRATE AND CITRIC ACID MONOHYDRATE 500; 334 MG/5ML; MG/5ML
30 SOLUTION ORAL ONCE AS NEEDED
Status: DISCONTINUED | OUTPATIENT
Start: 2020-07-14 | End: 2020-07-14

## 2020-07-14 RX ORDER — SIMETHICONE 80 MG
80 TABLET,CHEWABLE ORAL 4 TIMES DAILY PRN
Status: DISCONTINUED | OUTPATIENT
Start: 2020-07-14 | End: 2020-07-17 | Stop reason: HOSPADM

## 2020-07-14 RX ORDER — EPHEDRINE SULFATE 50 MG/ML
INJECTION, SOLUTION INTRAVENOUS AS NEEDED
Status: DISCONTINUED | OUTPATIENT
Start: 2020-07-14 | End: 2020-07-14 | Stop reason: SURG

## 2020-07-14 RX ORDER — CEFAZOLIN SODIUM 2 G/100ML
2 INJECTION, SOLUTION INTRAVENOUS ONCE
Status: COMPLETED | OUTPATIENT
Start: 2020-07-14 | End: 2020-07-14

## 2020-07-14 RX ORDER — ONDANSETRON 2 MG/ML
4 INJECTION INTRAMUSCULAR; INTRAVENOUS ONCE AS NEEDED
Status: DISCONTINUED | OUTPATIENT
Start: 2020-07-14 | End: 2020-07-17 | Stop reason: HOSPADM

## 2020-07-14 RX ADMIN — EPHEDRINE SULFATE 5 MG: 50 INJECTION INTRAVENOUS at 09:06

## 2020-07-14 RX ADMIN — Medication 80 MG: at 23:19

## 2020-07-14 RX ADMIN — PHENYLEPHRINE HYDROCHLORIDE 200 MCG: 10 INJECTION INTRAVENOUS at 09:17

## 2020-07-14 RX ADMIN — PHENYLEPHRINE HYDROCHLORIDE 100 MCG: 10 INJECTION INTRAVENOUS at 09:43

## 2020-07-14 RX ADMIN — PHENYLEPHRINE HYDROCHLORIDE 100 MCG: 10 INJECTION INTRAVENOUS at 09:04

## 2020-07-14 RX ADMIN — NALBUPHINE HYDROCHLORIDE 2 MG: 10 INJECTION, SOLUTION INTRAMUSCULAR; INTRAVENOUS; SUBCUTANEOUS at 12:27

## 2020-07-14 RX ADMIN — NALBUPHINE HYDROCHLORIDE 2 MG: 10 INJECTION, SOLUTION INTRAMUSCULAR; INTRAVENOUS; SUBCUTANEOUS at 22:02

## 2020-07-14 RX ADMIN — DOCUSATE SODIUM 100 MG: 100 CAPSULE, LIQUID FILLED ORAL at 20:33

## 2020-07-14 RX ADMIN — OXYTOCIN 999 ML/HR: 10 INJECTION INTRAVENOUS at 09:22

## 2020-07-14 RX ADMIN — MORPHINE SULFATE 200 MCG: 1 INJECTION, SOLUTION EPIDURAL; INTRATHECAL; INTRAVENOUS at 08:59

## 2020-07-14 RX ADMIN — HYDROMORPHONE HYDROCHLORIDE 0.5 MG: 1 INJECTION, SOLUTION INTRAMUSCULAR; INTRAVENOUS; SUBCUTANEOUS at 11:45

## 2020-07-14 RX ADMIN — PHENYLEPHRINE HYDROCHLORIDE 100 MCG: 10 INJECTION INTRAVENOUS at 09:48

## 2020-07-14 RX ADMIN — OXYTOCIN 125 ML/HR: 10 INJECTION INTRAVENOUS at 10:55

## 2020-07-14 RX ADMIN — PHENYLEPHRINE HYDROCHLORIDE 100 MCG: 10 INJECTION INTRAVENOUS at 09:06

## 2020-07-14 RX ADMIN — OXYCODONE HYDROCHLORIDE AND ACETAMINOPHEN 1 TABLET: 5; 325 TABLET ORAL at 12:11

## 2020-07-14 RX ADMIN — Medication 1 APPLICATION: at 17:08

## 2020-07-14 RX ADMIN — NALBUPHINE HYDROCHLORIDE 2 MG: 10 INJECTION, SOLUTION INTRAMUSCULAR; INTRAVENOUS; SUBCUTANEOUS at 17:14

## 2020-07-14 RX ADMIN — PHENYLEPHRINE HYDROCHLORIDE 100 MCG: 10 INJECTION INTRAVENOUS at 09:08

## 2020-07-14 RX ADMIN — EPHEDRINE SULFATE 15 MG: 50 INJECTION INTRAVENOUS at 09:08

## 2020-07-14 RX ADMIN — EPHEDRINE SULFATE 10 MG: 50 INJECTION INTRAVENOUS at 09:10

## 2020-07-14 RX ADMIN — OXYCODONE HYDROCHLORIDE AND ACETAMINOPHEN 2 TABLET: 5; 325 TABLET ORAL at 17:08

## 2020-07-14 RX ADMIN — GLYCOPYRROLATE 0.2 MG: 0.2 INJECTION INTRAMUSCULAR; INTRAVENOUS at 09:06

## 2020-07-14 RX ADMIN — FAMOTIDINE 20 MG: 10 INJECTION INTRAVENOUS at 07:49

## 2020-07-14 RX ADMIN — OXYCODONE HYDROCHLORIDE AND ACETAMINOPHEN 2 TABLET: 5; 325 TABLET ORAL at 22:03

## 2020-07-14 RX ADMIN — ONDANSETRON 4 MG: 2 INJECTION INTRAMUSCULAR; INTRAVENOUS at 07:46

## 2020-07-14 RX ADMIN — SODIUM CHLORIDE, POTASSIUM CHLORIDE, SODIUM LACTATE AND CALCIUM CHLORIDE 1000 ML: 600; 310; 30; 20 INJECTION, SOLUTION INTRAVENOUS at 06:20

## 2020-07-14 RX ADMIN — SODIUM CHLORIDE, POTASSIUM CHLORIDE, SODIUM LACTATE AND CALCIUM CHLORIDE 125 ML/HR: 600; 310; 30; 20 INJECTION, SOLUTION INTRAVENOUS at 07:19

## 2020-07-14 RX ADMIN — HEPARIN SODIUM 10000 UNITS: 5000 INJECTION INTRAVENOUS; SUBCUTANEOUS at 23:19

## 2020-07-14 RX ADMIN — EPHEDRINE SULFATE 10 MG: 50 INJECTION INTRAVENOUS at 09:43

## 2020-07-14 RX ADMIN — ACETAMINOPHEN 1000 MG: 500 TABLET, FILM COATED ORAL at 08:16

## 2020-07-14 RX ADMIN — PHENYLEPHRINE HYDROCHLORIDE 100 MCG: 10 INJECTION INTRAVENOUS at 09:11

## 2020-07-14 RX ADMIN — PHENYLEPHRINE HYDROCHLORIDE 200 MCG: 10 INJECTION INTRAVENOUS at 09:40

## 2020-07-14 RX ADMIN — CEFAZOLIN SODIUM 2 G: 2 INJECTION, SOLUTION INTRAVENOUS at 08:30

## 2020-07-14 RX ADMIN — DIPHENHYDRAMINE HYDROCHLORIDE 25 MG: 50 INJECTION INTRAMUSCULAR; INTRAVENOUS at 09:48

## 2020-07-14 RX ADMIN — EPHEDRINE SULFATE 10 MG: 50 INJECTION INTRAVENOUS at 09:17

## 2020-07-14 RX ADMIN — BUPIVACAINE HYDROCHLORIDE 1.5 ML: 7.5 INJECTION, SOLUTION EPIDURAL; RETROBULBAR at 08:59

## 2020-07-14 RX ADMIN — FENTANYL CITRATE 20 MCG: 50 INJECTION INTRAMUSCULAR; INTRAVENOUS at 08:59

## 2020-07-14 NOTE — ANESTHESIA POSTPROCEDURE EVALUATION
"Patient: Sandra Valadez    Procedure Summary     Date:  20 Room / Location:   [facility] /  [facility]    Anesthesia Start:  08 Anesthesia Stop:      Procedure:   SECTION REPEAT (N/A Abdomen) Diagnosis:       History of  delivery      Request for sterilization      (History of  delivery [Z98.891])      (Request for sterilization [Z30.2])    Surgeon:  Noemi Mittal MD Provider:  Grover Pugh MD    Anesthesia Type:  spinal ASA Status:  3          Anesthesia Type: spinal    Vitals  Vitals Value Taken Time   /75 2020  6:25 PM   Temp 36.1 °C (97 °F) 2020  1:25 PM   Pulse 68 2020  6:25 PM   Resp 20 2020  6:25 PM   SpO2 100 % 2020  6:25 PM           Post Anesthesia Care and Evaluation    Patient location during evaluation: bedside  Patient participation: complete - patient participated  Level of consciousness: awake and alert  Pain management: adequate  Airway patency: patent  Anesthetic complications: No anesthetic complications  PONV Status: NA  Cardiovascular status: acceptable and hemodynamically stable  Respiratory status: acceptable  Hydration status: acceptable  Post Neuraxial Block status: No signs or symptoms of PDPH  Comments: /75   Pulse 68   Temp 36.1 °C (97 °F) (Oral)   Resp 20   Ht 152.4 cm (60\")   LMP 10/18/2019   SpO2 100%   Breastfeeding Yes   BMI 38.47 kg/m²       "

## 2020-07-14 NOTE — NURSING NOTE
Pt had Covid test done at outpatient care center on 7/12/2020, results were negative and copy of results on pt chart,  informed and ok with those test results for this admission

## 2020-07-14 NOTE — LACTATION NOTE
Mom currently has baby latched with deep latch. Baby has strong suck at this time. Mom reports BF her son for 6 months and her 2 girls for 3 months. Mom reports using nipple shield before but this baby is latching well without. Encouraged mom to BF every 2-3 hours and educated on importance of deep latching. Mom has personal pump. Encouraged to call if needing further assistance.  Lactation Consult Note    Evaluation Completed: 2020 16:55  Patient Name: Sandra Valadez  :  1995  MRN:  [id number]     REFERRAL  INFORMATION:                                         DELIVERY HISTORY:  Infant First Feeding: breastfeeding       Skin to skin initiation date/time: 2020  9:35 AM   Skin to skin end date/time:              MATERNAL ASSESSMENT:                               INFANT ASSESSMENT:  Information for the patient's :  Luis Valadez [[id number]]   No past medical history on file.                                                                                                                                MATERNAL INFANT FEEDING:                                                                       EQUIPMENT TYPE:                                 BREAST PUMPING:          LACTATION REFERRALS:

## 2020-07-14 NOTE — OP NOTE
. SECTION FULL OPERATIVE REPORT  Pre-operative Diagnosis: 38 week intrauterine pregnancy,  3 previous  deliveries,  pulmonary embolism during pregnancy  Post-operative Diagnosis: same  Procedure: Repeat Low Transverse  Section  Anesthesia: spinal  Surgeon(s): Noemi Mittal MD  Assistant(s): Jaciel Real MD  Infant: LV female, Apgars 8/9, weight 7lbs, 6.5 oz  Findings: Normal uterus, tubes and ovaries  Complications: none  Specimens: placenta  EBL: 859 mL      Description of Procedure:  The patient was taken to the operating room where anesthesia was found to be adequate. She was prepped and draped in the usual sterile fashion in the dorsal supine position with a leftward tilt. A surgical time-out was performed.  A Pfannenstiel skin incision was made with the scalpel overlying the prior incision and carried down to the underlying layer of fascia. The fascia was then incised in the midline and the incision was extended laterally with guerrero scissors. The superior aspect of the fascia was then grasped with Kocher clamps and the underlying rectus muscles were then dissected off bluntly with the Guerrero scissors. The inferior aspect of the fascia was then grasped with Kocher clamps and dissected off similarly with Guerrero scissors. The rectus muscles were  and the peritoneum entered. The peritoneal incision was then carried superiorly and inferiorly taking care to identify the bladder at the lower aspect.   The bladder blade was inserted. The vesicouterine peritoneum was then identified and entered sharply with Metzenbaum scissors and a bladder flap was created. The bladder blade was reinserted and the uterine incision was made in a low transverse fashion using the scalpel. This was extended with the bandage scissors.  The bladder blade was removed and the infant was delivered atraumatically. The nose and mouth were suctioned and the cord was clamped and cut after 30 seconds. The infant was taken  to the warmer for the waiting staff. Cord blood was collected. The placenta was removed with gentle manual traction. The uterus was exteriorized, and cleared of all clots and debris. The uterine incision was repaired in ta running locking fashion with 0 vicryl suture. Thre were 4 small areas of oozing above the incision that were made hemostatic with figure-of-eight stitches of 0 vicryl. The uterus was examined and noted to be hemostatic and returned to the abdomen.  The posterior cul-de-sac and gutters were cleared of all clots and debris. The uterus was then re-inspected to ensure hemostasis as were all subfascial tissues. The fascia was re-approximated in a running fashion using 0-vicryl suture. The subcutaneous tissue was re-approximated in a running fashion with 3-0 vicryl. The skin was closed with staples.    The patient tolerated the procedure well. Sponge, lap and needle counts were correct. The patient was taken to recovery in stable condition. The patient received antibiotic prophylaxis with Kefzol.    Noemi Mittal MD  July 14, 2020

## 2020-07-14 NOTE — ANESTHESIA PROCEDURE NOTES
Spinal Block      Patient reassessed immediately prior to procedure    Patient location during procedure: OR  Performed By  Anesthesiologist: Grover Pugh MD  CRNA: Drea Haas CRNA  Preanesthetic Checklist  Completed: patient identified, pre-op evaluation, timeout performed, IV checked, risks and benefits discussed and monitors and equipment checked  Spinal Block Prep:  Patient Position:sitting  Sterile Tech:cap, gloves, mask and sterile barriers  Prep:Chloraprep  Patient Monitoring:blood pressure monitoring and continuous pulse oximetry  Spinal Block Procedure  Approach:midline  Guidance:landmark technique  Location:L4-L5  Needle Type:Sprotte  Needle Gauge:24 G  Placement of Spinal needle event:cerebrospinal fluid aspirated  Paresthesia: right and transient  Fluid Appearance:clear  Medications: fentaNYL citrate (PF) (SUBLIMAZE) injection, 20 mcg  Morphine PF injection, 200 mcg  bupivacaine PF (MARCAINE) 0.75 % injection, 1.5 mL  Med Administered at 7/14/2020 8:59 AM   Post Assessment  Patient Tolerance:patient tolerated the procedure well with no apparent complications  Complications no

## 2020-07-14 NOTE — ANESTHESIA PREPROCEDURE EVALUATION
Anesthesia Evaluation     Patient summary reviewed and Nursing notes reviewed   NPO Solid Status: > 8 hours  NPO Liquid Status: > 2 hours           Airway   Mallampati: II  TM distance: >3 FB  Neck ROM: full  Dental - normal exam     Pulmonary - normal exam    breath sounds clear to auscultation  (+) pulmonary embolism,   Cardiovascular - negative cardio ROS and normal exam    Rhythm: regular  Rate: normal    (-) angina, orthopnea, PND, NEGRETE      Neuro/Psych- negative ROS  GI/Hepatic/Renal/Endo    (+) obesity,       Musculoskeletal (-) negative ROS    Abdominal    Substance History - negative use     OB/GYN    (+) Pregnant,         Other   blood dyscrasia (Lupus anticoagulant with hypercoagulable state ),   history of cancer                    Anesthesia Plan    ASA 3     spinal       Anesthetic plan, all risks, benefits, and alternatives have been provided, discussed and informed consent has been obtained with: patient.

## 2020-07-14 NOTE — PLAN OF CARE
Problem: Patient Care Overview  Goal: Plan of Care Review  Outcome: Ongoing (interventions implemented as appropriate)  Flowsheets (Taken 7/14/2020 1213)  Progress: improving  Plan of Care Reviewed With: patient; significant other  Outcome Summary: pt delivered RCS with spinal,  in recovery, bleeding and VS WNL     Problem: Patient Care Overview  Goal: Individualization and Mutuality  Outcome: Ongoing (interventions implemented as appropriate)  Flowsheets (Taken 7/14/2020 1213)  Patient Specific Goals (Include Timeframe): home when possible, successful breastfeeding  Patient Specific Interventions: po pain meds as needed, meds for itching  What Anxieties, Fears, Concerns, or Questions Do You Have About Your Care?: none  Patient Specific Preferences: breastfeeding, pt would like nipple shiels, baby with her in room

## 2020-07-14 NOTE — H&P
[facility]  Obstetric History and Physical    Chief Complaint   Patient presents with   • Scheduled      denies ctx, lof, vb. +FM        Subjective     Patient is a 25 y.o. female  currently at 38w4d, who presents for scheduled  delivery. She has had 3 prior  deliveries. She understands risks of procedure to include bleeding, transfusion, infection, damage to organs, need for additional surgeries due to complications, DVT/PE and death. She understands that her surgical risks are increased due to history of multiple  deliveries. She was planning tubal sterilization but now declines this procedure. She is aware that future pregnancies would be at significant risk for medical and surgical complications.     Her pregnancy has been complicated by pulmonary embolus. She has been anticoagulated on lovenox then changed to heparin at term. She took her last dose of heparin 24 hours ago. She has been managed with hematology.     She denies shortness of air, cough, chest pain,fever/chills, regular contractions, vaginal bleeding or leaking fluid. She denies n/v or abdominal pain. She is feeling active fetal movement.     Her prenatal care is complicated by .  Patient Active Problem List   Diagnosis   • HSV (herpes simplex virus) anogenital infection   • Elevated prolactin level   • Urinary tract infection in mother during pregnancy, antepartum   • Tachycardia   • Dysplasia of cervix, low grade (JENNA 1)   • Group B Streptococcus urinary tract infection affecting pregnancy, antepartum   • Maternal pulmonary embolus (PE), current pregnancy   • Iron deficiency anemia during pregnancy   • Adverse effect of iron and its compounds, initial encounter   • Lupus anticoagulant with hypercoagulable state (CMS/HCC)   • Anticoagulated on heparin   • Previous  delivery affecting pregnancy     Her previous obstetric/gynecological history is noted for 3  deliveries.    The following  portions of the patients history were reviewed and updated as appropriate: current medications, allergies, past medical history, past surgical history, past family history, past social history and problem list .       Prenatal Information:  Prenatal Results     POC Urine Glucose/Protein     Test Value Reference Range Date Time    Urine Glucose Negative mg/dL Negative, 1000 mg/dL (3+) 07/07/20 1311    Urine Protein Negative mg/dL Negative 07/07/20 1311          Initial Prenatal Labs     Test Value Reference Range Date Time    Hemoglobin 11.3 g/dL 12.0 - 15.9 03/16/20 1317      11.7 g/dL 12.0 - 15.9 03/13/20 1252      10.7 g/dL 12.0 - 15.9 03/08/20 0534      11.7 g/dL 12.0 - 15.9 03/07/20 0825      12.1 g/dL 12.0 - 15.9 02/03/20 1241      12.9 g/dL 11.1 - 15.9 11/22/19 1512    Hematocrit 32.8 % 34.0 - 46.6 03/16/20 1317      34.5 % 34.0 - 46.6 03/13/20 1252      31.3 % 34.0 - 46.6 03/08/20 0534      34.7 % 34.0 - 46.6 03/07/20 0825      36.4 % 34.0 - 46.6 02/03/20 1241      38.6 % 34.0 - 46.6 11/22/19 1512    Platelets 207 10*3/mm3 140 - 450 07/14/20 0633      206 10*3/mm3 140 - 450 07/07/20 1018      211 10*3/mm3 140 - 450 07/02/20 1026      227 10*3/mm3 140 - 450 06/23/20 1306      211 10*3/mm3 140 - 450 05/26/20 1325      255 10*3/mm3 140 - 450 04/16/20 1038      287 10*3/mm3 140 - 450 03/27/20 1406      248 10*3/mm3 140 - 450 03/16/20 1317      280 10*3/mm3 140 - 450 03/13/20 1252      244 10*3/mm3 140 - 450 03/08/20 0534      275 10*3/mm3 140 - 450 03/07/20 0825      294 10*3/mm3 140 - 450 02/03/20 1241      394 x10E3/uL 150 - 450 11/22/19 1512    Rubella IgG 1.24 index Immune >0.99 11/22/19 1512    Hepatitis B SAg Negative  Negative 11/22/19 1512    Hepatitis C Ab <0.1 s/co ratio 0.0 - 0.9 11/22/19 1512    RPR Non Reactive  Non Reactive 11/22/19 1512    ABO O   07/14/20 0633    Rh Positive   07/14/20 0633    Antibody Screen Negative   03/07/20 1527      Negative  Negative 11/22/19 1512    HIV Non Reactive  Non  Reactive 11/22/19 1512    Urine Culture Final report   02/07/20 1425      Final report   12/09/19 1511    Gonorrhea Negative  Negative 12/09/19 1510    Chlamydia Negative  Negative 12/09/19 1510    TSH 1.660 uIU/mL 0.270 - 4.200 02/03/20 1241      2.120 uIU/mL 0.270 - 4.200 11/04/19 1020          2nd and 3rd Trimester     Test Value Reference Range Date Time    Hemoglobin (repeated) 12.5 g/dL 12.0 - 15.9 07/14/20 0633      11.9 g/dL 12.0 - 15.9 07/07/20 1018      11.9 g/dL 12.0 - 15.9 07/02/20 1026      12.5 g/dL 12.0 - 15.9 06/23/20 1306      12.2 g/dL 12.0 - 15.9 05/26/20 1325      11.6 g/dL 12.0 - 15.9 04/16/20 1038      10.7 g/dL 12.0 - 15.9 03/27/20 1406    Hematocrit (repeated) 36.1 % 34.0 - 46.6 07/14/20 0633      35.7 % 34.0 - 46.6 07/07/20 1018      36.0 % 34.0 - 46.6 07/02/20 1026      38.5 % 34.0 - 46.6 06/23/20 1306      36.9 % 34.0 - 46.6 05/26/20 1325      34.8 % 34.0 - 46.6 04/16/20 1038      31.8 % 34.0 - 46.6 03/27/20 1406    GCT 89 mg/dL 65 - 179 04/16/20 1038      95 mg/dL 65 - 179 03/13/20 1252    Antibody Screen (repeated) Negative   07/14/20 0633    GTT Fasting        GTT 1 Hr        GTT 2 Hr        GTT 3 Hr        Group B Strep              Drug Screening     Test Value Reference Range Date Time    Amphetamine Screen        Barbiturate Screen        Benzodiazepine Screen        Methadone Screen        Phencyclidine Screen        Opiates Screen        THC Screen        Cocaine Screen        Propoxyphene Screen        Buprenorphine Screen        Methamphetamine Screen        Oxycodone Screen        Tricyclic Antidepressants Screen              Other (Risk screening)     Test Value Reference Range Date Time    Varicella IgG had vaccine   12/09/19     Parvovirus IgG        CMV IgG        Cystic Fibrosis negative   12/30/19     Hemoglobin electrophoresis        NIPT normal   12/30/19     MSAFP-4        AFP (for NTD only) *Screen Negative*   02/17/20 1014              External Prenatal Results      Pregnancy Outside Results - Transcribed From Office Records - See Scanned Records For Details     Test Value Date Time    Hgb 12.5 g/dL 07/14/20 0633      11.9 g/dL 07/07/20 1018      11.9 g/dL 07/02/20 1026      12.5 g/dL 06/23/20 1306      12.2 g/dL 05/26/20 1325      11.6 g/dL 04/16/20 1038      10.7 g/dL 03/27/20 1406      11.3 g/dL 03/16/20 1317      11.7 g/dL 03/13/20 1252      10.7 g/dL 03/08/20 0534      11.7 g/dL 03/07/20 0825      12.1 g/dL 02/03/20 1241      12.9 g/dL 11/22/19 1512    Hct 36.1 % 07/14/20 0633      35.7 % 07/07/20 1018      36.0 % 07/02/20 1026      38.5 % 06/23/20 1306      36.9 % 05/26/20 1325      34.8 % 04/16/20 1038      31.8 % 03/27/20 1406      32.8 % 03/16/20 1317      34.5 % 03/13/20 1252      31.3 % 03/08/20 0534      34.7 % 03/07/20 0825      36.4 % 02/03/20 1241      38.6 % 11/22/19 1512    ABO O  07/14/20 0633    Rh Positive  07/14/20 0633    Antibody Screen Negative  07/14/20 0633      Negative  03/07/20 1527      Negative  11/22/19 1512    Glucose Fasting GTT       Glucose Tolerance Test 1 hour       Glucose Tolerance Test 3 hour       Gonorrhea (discrete) Negative  12/09/19 1510    Chlamydia (discrete) Negative  12/09/19 1510    RPR Non Reactive  11/22/19 1512    VDRL       Syphilis Antibody       Rubella 1.24 index 11/22/19 1512    HBsAg Negative  11/22/19 1512    Herpes Simplex Virus PCR       Herpes Simplex VIrus Culture       HIV Non Reactive  11/22/19 1512    Hep C RNA Quant PCR       Hep C Antibody <0.1 s/co ratio 11/22/19 1512    AFP 44.0 ng/mL 02/17/20 1014    Group B Strep       GBS Susceptibility to Clindamycin       GBS Susceptibility to Erythromycin       Fetal Fibronectin       Genetic Testing, Maternal Blood             Drug Screening     Test Value Date Time    Urine Drug Screen       Amphetamine Screen       Barbiturate Screen       Benzodiazepine Screen       Methadone Screen       Phencyclidine Screen       Opiates Screen       THC Screen       Cocaine  Screen       Propoxyphene Screen       Buprenorphine Screen       Methamphetamine Screen       Oxycodone Screen       Tricyclic Antidepressants Screen                    Past OB History:     OB History    Para Term  AB Living   5 3 3 0 1 3   SAB TAB Ectopic Molar Multiple Live Births   1 0 0 0 0 3      # Outcome Date GA Lbr Alphonso/2nd Weight Sex Delivery Anes PTL Lv   5 Current            4 Term    3629 g (8 lb) F CS-LTranv   [facility]   3 Term    3544 g (7 lb 13 oz) F CS-LTranv  N [facility]   2 Term    4252 g (9 lb 6 oz) M CS-LTranv  N [facility]      Birth Comments: for suspected macrosomia    1 SAB  12w0d    SAB         Birth Comments: no D&C        Past Medical History: Past Medical History:   Diagnosis Date   • Abnormal Pap smear of cervix    • Chlamydia     at age 14 ,   • H/O Depressive disorder    • Herpes     last outbreak    • History of anxiety    • History of urinary tract infection    • Pulmonary embolism (CMS/HCC) 2020    during pregnancy      Past Surgical History Past Surgical History:   Procedure Laterality Date   •  SECTION  , , 2015    x3       Family History: Family History   Problem Relation Age of Onset   • Diabetes Maternal Uncle    • Breast cancer Paternal Grandmother    • Breast cancer Paternal Aunt    • Breast cancer Paternal Aunt    • Thyroid disease Mother    • Birth defects Neg Hx       Social History:  reports that she has never smoked. She has never used smokeless tobacco.   reports that she drank alcohol.   reports that she does not use drugs.        General ROS: Pertinent items are noted in HPI    Objective       Vital Signs Range for the last 24 hours  Temperature: Temp:  [98.6 °F (37 °C)] 98.6 °F (37 °C)   Temp Source: Temp src: Oral   BP: BP: (113-122)/(73-90) 113/73   Pulse: Heart Rate:  [] 94   Respirations: Resp:  [18] 18   SPO2: SpO2:  [99 %] 99 %   O2 Amount (l/min):     O2 Devices     Weight:       Physical Examination: General appearance  - alert, well appearing, and in no distress  Mental status - alert, oriented to person, place, and time  Chest - clear to auscultation, no wheezes, rales or rhonchi, symmetric air entry  Heart - normal rate and regular rhythm  Abdomen - gravid, nontender  Neurological - alert, oriented, normal speech, no focal findings or movement disorder noted  Musculoskeletal - normal range of motion  Extremities - bilateral lower extremities without cords, tenderness or edema  Skin - normal coloration and turgor    Presentation: def   Cervix:      Dilation:     Effacement:     Station:         Fetal Heart Rate Assessment   Method: Fetal HR Assessment Method: external   Beats/min: Fetal HR (beats/min): 125   Baseline: Fetal Heart Baseline Rate: normal range   Variability: Fetal HR Variability: moderate (amplitude range 6 to 25 bpm)(period of minimal )   Accels: Fetal HR Accelerations: greater than/equal to 15 bpm, lasting at least 15 seconds   Decels: Fetal HR Decelerations: absent   Tracing Category:       Uterine Assessment   Method: Method: palpation, external tocotransducer   Frequency (min): Contraction Frequency (Minutes): occassional   Ctx Count in 10 min:     Duration:     Intensity: Contraction Intensity: no contractions   Intensity by IUPC:     Resting Tone: Uterine Resting Tone: soft by palpation   Resting Tone by IUPC:     [place] Units:       Laboratory Results:   Lab Results   Component Value Date    WBC 10.04 2020    HGB 12.5 2020    HCT 36.1 2020    MCV 87.6 2020     2020     Results from last 7 days   Lab Units 20  0633   INR  1.01   APTT seconds 33.0         Assessment/Plan       Group B Streptococcus urinary tract infection affecting pregnancy, antepartum    Maternal pulmonary embolus (PE), current pregnancy    Lupus anticoagulant with hypercoagulable state (CMS/HCC)    Previous  delivery affecting pregnancy        Assessment:  1.  Intrauterine pregnancy at  38w4d gestation with reactive fetal status.    2.  History of 3 prior  deliveries: pt presents for repeat  today. She denies any issues this am. She now declines tubal sterilization. She is aware future pregnancies would be high risk. Her pregnancy has been complicated by pulmonary embolus. She currently denies shortness of air or chest pain. She took her last dose of heparin 24 hours ago and will re-start anticoagulation postpartum. She has been managed with hematology.  3.  Obstetrical history significant for 3 prior  deliveries  4.  GBS status: Positive    Plan:  1. Proceed with  delivery  2. Plan of care has been reviewed with patient and partner  3.  Risks, benefits of treatment plan have been discussed.  4.  All questions have been answered.        Noemi Mittal MD  2020  08:25

## 2020-07-15 LAB
BASOPHILS # BLD AUTO: 0.03 10*3/MM3 (ref 0–0.2)
BASOPHILS NFR BLD AUTO: 0.3 % (ref 0–1.5)
DEPRECATED RDW RBC AUTO: 43.8 FL (ref 37–54)
EOSINOPHIL # BLD AUTO: 0.06 10*3/MM3 (ref 0–0.4)
EOSINOPHIL NFR BLD AUTO: 0.6 % (ref 0.3–6.2)
ERYTHROCYTE [DISTWIDTH] IN BLOOD BY AUTOMATED COUNT: 13.6 % (ref 12.3–15.4)
HCT VFR BLD AUTO: 30.8 % (ref 34–46.6)
HGB BLD-MCNC: 10.5 G/DL (ref 12–15.9)
IMM GRANULOCYTES # BLD AUTO: 0.11 10*3/MM3 (ref 0–0.05)
IMM GRANULOCYTES NFR BLD AUTO: 1.1 % (ref 0–0.5)
LYMPHOCYTES # BLD AUTO: 1.6 10*3/MM3 (ref 0.7–3.1)
LYMPHOCYTES NFR BLD AUTO: 16.2 % (ref 19.6–45.3)
MCH RBC QN AUTO: 29.8 PG (ref 26.6–33)
MCHC RBC AUTO-ENTMCNC: 34.1 G/DL (ref 31.5–35.7)
MCV RBC AUTO: 87.5 FL (ref 79–97)
MONOCYTES # BLD AUTO: 0.85 10*3/MM3 (ref 0.1–0.9)
MONOCYTES NFR BLD AUTO: 8.6 % (ref 5–12)
NEUTROPHILS NFR BLD AUTO: 7.24 10*3/MM3 (ref 1.7–7)
NEUTROPHILS NFR BLD AUTO: 73.2 % (ref 42.7–76)
NRBC BLD AUTO-RTO: 0 /100 WBC (ref 0–0.2)
PLATELET # BLD AUTO: 192 10*3/MM3 (ref 140–450)
PMV BLD AUTO: 10.1 FL (ref 6–12)
RBC # BLD AUTO: 3.52 10*6/MM3 (ref 3.77–5.28)
WBC # BLD AUTO: 9.89 10*3/MM3 (ref 3.4–10.8)

## 2020-07-15 PROCEDURE — 25010000002 HEPARIN (PORCINE) PER 1000 UNITS: Performed by: OBSTETRICS & GYNECOLOGY

## 2020-07-15 PROCEDURE — 25010000002 NALBUPHINE PER 10 MG: Performed by: ANESTHESIOLOGY

## 2020-07-15 PROCEDURE — 99232 SBSQ HOSP IP/OBS MODERATE 35: CPT | Performed by: INTERNAL MEDICINE

## 2020-07-15 PROCEDURE — 85025 COMPLETE CBC W/AUTO DIFF WBC: CPT | Performed by: OBSTETRICS & GYNECOLOGY

## 2020-07-15 RX ORDER — IBUPROFEN 600 MG/1
600 TABLET ORAL EVERY 6 HOURS PRN
Status: DISCONTINUED | OUTPATIENT
Start: 2020-07-15 | End: 2020-07-17 | Stop reason: HOSPADM

## 2020-07-15 RX ORDER — HYDROXYZINE 50 MG/1
50 TABLET, FILM COATED ORAL EVERY 6 HOURS PRN
Status: DISCONTINUED | OUTPATIENT
Start: 2020-07-15 | End: 2020-07-17 | Stop reason: HOSPADM

## 2020-07-15 RX ADMIN — OXYCODONE HYDROCHLORIDE AND ACETAMINOPHEN 2 TABLET: 5; 325 TABLET ORAL at 04:15

## 2020-07-15 RX ADMIN — NALBUPHINE HYDROCHLORIDE 2 MG: 10 INJECTION, SOLUTION INTRAMUSCULAR; INTRAVENOUS; SUBCUTANEOUS at 04:16

## 2020-07-15 RX ADMIN — HEPARIN SODIUM 10000 UNITS: 5000 INJECTION INTRAVENOUS; SUBCUTANEOUS at 11:13

## 2020-07-15 RX ADMIN — HEPARIN SODIUM 10000 UNITS: 5000 INJECTION INTRAVENOUS; SUBCUTANEOUS at 21:31

## 2020-07-15 RX ADMIN — IBUPROFEN 600 MG: 600 TABLET ORAL at 18:47

## 2020-07-15 RX ADMIN — DOCUSATE SODIUM 100 MG: 100 CAPSULE, LIQUID FILLED ORAL at 09:32

## 2020-07-15 RX ADMIN — OXYCODONE HYDROCHLORIDE AND ACETAMINOPHEN 2 TABLET: 5; 325 TABLET ORAL at 09:31

## 2020-07-15 RX ADMIN — NALBUPHINE HYDROCHLORIDE 2 MG: 10 INJECTION, SOLUTION INTRAMUSCULAR; INTRAVENOUS; SUBCUTANEOUS at 09:27

## 2020-07-15 RX ADMIN — OXYCODONE HYDROCHLORIDE AND ACETAMINOPHEN 2 TABLET: 5; 325 TABLET ORAL at 21:25

## 2020-07-15 RX ADMIN — IBUPROFEN 600 MG: 600 TABLET ORAL at 11:14

## 2020-07-15 RX ADMIN — DOCUSATE SODIUM 100 MG: 100 CAPSULE, LIQUID FILLED ORAL at 21:25

## 2020-07-15 RX ADMIN — OXYCODONE HYDROCHLORIDE AND ACETAMINOPHEN 2 TABLET: 5; 325 TABLET ORAL at 15:46

## 2020-07-15 NOTE — LACTATION NOTE
P4. Baby has had insufficient output and an abdominal scan has been ordered. Baby went eagerly to breast after 's exam . Mom denies hx of low supply or mastitis. Has personal pump at home. Enc patient to undress baby and keep her S2S for frequent breastfeeding .  Lactation Consult Note    Evaluation Completed: 7/15/2020 16:54  Patient Name: Sandra Valadez  :  1995  MRN:  [id number]     REFERRAL  INFORMATION:                          Date of Referral: 07/15/20   Person Making Referral: physician  Maternal Reason for Referral: breastfeeding currently  Infant Reason for Referral: other (see comments)(inadequate output.)    DELIVERY HISTORY:  Infant First Feeding: breastfeeding       Skin to skin initiation date/time: 2020  9:35 AM   Skin to skin end date/time:              MATERNAL ASSESSMENT:  Breast Size Issue: none (07/15/20 1645 : Kourtney Jeter RN)  Breast Shape: pendulous, round (07/15/20 1645 : Kourtney Jeter, RN)  Breast Density: soft (07/15/20 1645 : Kourtney Jeter, RN)  Areola: elastic (07/15/20 1645 : Kourtney Jeter, RN)  Nipples: graspable (07/15/20 1645 : Kourtney Jeter, RN)                INFANT ASSESSMENT:  Information for the patient's :  Luis Valadez [[id number]]   No past medical history on file.    Feeding Readiness Cues: eager, energy for feeding, hand to mouth movements, rooting (07/15/20 1622 : Kourtney Jeter, RN)   Feeding Method: breastfeeding (07/15/20 1622 : Kourtney Jeter, RN)   Feeding Tolerance/Success: coordinated suck, coordinated swallow, adequate pause for breath, rooting, eager, strong suck (07/15/20 1622 : Kourtney Jeter, RN)                   Feeding Interventions: feeding cues monitored, jaw supported, latch assistance provided (07/15/20 1622 : Kourtney Jeter, RN)   Nutrition Interventions: lactation consult initiated (07/15/20 1622 : Kourtney Jeter, RN)   Additional Documentation: LATCH Score  (Group) (07/15/20 1622 : Kourtney Jeter RN)           Breastfeeding: breastfeeding, right side only (07/15/20 1622 : Kourtney Jeter RN)   Infant Positioning: clutch/football (07/15/20 1622 : Kourtney Jeter RN)           Effective Latch During Feeding: yes (07/15/20 1622 : Kourtney Jeter RN)   Suck/Swallow Coordination: present (07/15/20 1622 : Kourtney Jeter RN)   Signs of Milk Transfer: infant jaw motion present, suck/swallow ratio (07/15/20 1622 : Kourtney Jeter RN)       Latch: 2-->grasps breast, tongue down, lips flanged, rhythmic sucking (07/15/20 1622 : Kourtney Jeter RN)   Audible Swallowin-->spontaneous and intermittent (24 hrs old) (07/15/20 1622 : Kourtney Jeter RN)   Type of Nipple: 2-->everted (after stimulation) (07/15/20 1622 : Kourtney Jeter RN)   Comfort (Breast/Nipple): 2-->soft/nontender (07/15/20 1622 : Kourtney Jeter RN)   Hold (Positioning): 1-->minimal assist, teach one side, mother does other, staff holds (07/15/20 1622 : Kourtney Jeter RN)   Latch Score: 9 (07/15/20 1622 : Kourtney Jeter RN)     Infant-Driven Feeding Scales - Readiness: Alert or fussy prior to care. Rooting and/or hands to mouth behavior. Good tone. (07/15/20 1622 : Kourtney Jeter RN)                 MATERNAL INFANT FEEDING:  Maternal Preparation: breast care, hand hygiene (07/15/20 1645 : Kourtney Jeter RN)  Maternal Emotional State: independent, relaxed (07/15/20 1645 : Kourtney Jeter, RN)  Infant Positioning: clutch/football (07/15/20 1645 : Kourtney Jeter RN)   Signs of Milk Transfer: infant jaw motion present, suck/swallow ratio (07/15/20 1645 : Kourtney Jeter RN)  Pain with Feeding: no (07/15/20 1645 : Kourtney Jeter RN)        Comfort Measures Before/During Feeding: latch adjusted (07/15/20 1645 : Kourtney Jeter RN)  Milk Ejection Reflex: present (07/15/20 1645 : Kourtney Jeter RN)           Latch  Assistance: yes (07/15/20 1645 : Kourtney Jeter, RN)                               EQUIPMENT TYPE:  Breast Pump Type: double electric, personal (07/15/20 1645 : Kourtney Jeter, RN)          Breast Care: Breastfeeding: breast milk to nipples, manual expression to soften breast, milk massaged towards nipple, open to air (07/15/20 1645 : Kourtney Jeter, RN)  Breastfeeding Assistance: assisted with techniques for flat/inverted nipples, feeding cue recognition promoted, feeding session observed, hand expression, infant latch-on verified, infant suck/swallow verified (07/15/20 1645 : Kourtney Jeter, RN)     Breastfeeding Support: diary/feeding log utilized, encouragement provided, lactation counseling provided, maternal hydration promoted (07/15/20 1645 : Kourtney Jeter, RN)          BREAST PUMPING:          LACTATION REFERRALS:  Lactation Referrals: outpatient lactation program (07/15/20 1645 : Kourtney Jeter, RN)

## 2020-07-15 NOTE — PROGRESS NOTES
Subjective  POSITIVE LUPUS ANTICOAGULANT  AND PE, COMPLETION OF PREGNANCY BY C SECTION TODAY, RECS IN REGARD FURTHER THERAPY          History of Present Illness 25-year-old white female who has previous history of lupus anticoagulant during pregnancy and pulmonary embolus who has had a  to deliver her 4th baby this morning. The patient is doing fine with no difficulties whatsoever at the time that I have seen her in the hospital room. The patient has had expected clinical bleeding from the vagina, the surgical incision remains clean. The baby is doing terrific. The patient has no pain, no cough, no shortness of breath, no swelling in her lower extremities   On 07/15/2020, the patient was further reviewed and transferred to a regular obstetric floor. She was up and about. The amount of vaginal bleeding was expected for somebody with a . The incision in the abdominal wall was not bleeding and she had no swelling in her lower extremities, chest pain, cough, sputum production or shortness of breath. She was doing breastfeeding at the time that I walked into the room. She had pain in the surgical site and her obstetrician called me requesting permission to proceed with therapy with ibuprofen that was perfectly fine with me.      Past Medical History, Past Surgical History, Social History, Family History have been reviewed and are without significant changes except as mentioned.    Review of Systems   Constitutional: Positive for activity change.   HENT: Negative.    Respiratory: Negative.    Cardiovascular: Negative.    Gastrointestinal: Negative.    Genitourinary: Positive for pelvic pain.   Musculoskeletal: Negative.    Skin: Negative.    Neurological: Negative.    Hematological: Negative.    Psychiatric/Behavioral: Negative.       A comprehensive 14 point review of systems was performed and was negative except as mentioned.    Medications:  The current medication list was reviewed in the  EM[facility]    ALLERGIES:  No Known Allergies    Objective      Vitals:    07/15/20 0400 07/15/20 0415 07/15/20 0600 07/15/20 0756   BP: 94/61   113/76   BP Location: Left arm   Right arm   Patient Position: Lying   Lying   Pulse: 68 83 86 73   Resp: 16 16 16 16   Temp: 97.9 °F (36.6 °C)   98.5 °F (36.9 °C)   TempSrc: Oral   Oral   SpO2:  97% 98%    Height:         Current Status 2020   ECOG score 0       Physical Exam   HENT:   Head: Normocephalic and atraumatic.   Right Ear: External ear normal.   Left Ear: External ear normal.   Nose: Nose normal.   Eyes: Pupils are equal, round, and reactive to light. Conjunctivae and EOM are normal.   Neck: Normal range of motion. Neck supple. No tracheal deviation present. No thyromegaly present.   Cardiovascular: Normal rate, normal heart sounds and intact distal pulses. Exam reveals no gallop and no friction rub.   Pulmonary/Chest: No stridor.   Abdominal: No hernia.   Nursing note and vitals reviewed.      RECENT LABS:  Hematology WBC   Date Value Ref Range Status   07/15/2020 9.89 3.40 - 10.80 10*3/mm3 Final     RBC   Date Value Ref Range Status   07/15/2020 3.52 (L) 3.77 - 5.28 10*6/mm3 Final     Hemoglobin   Date Value Ref Range Status   07/15/2020 10.5 (L) 12.0 - 15.9 g/dL Final     Hematocrit   Date Value Ref Range Status   07/15/2020 30.8 (L) 34.0 - 46.6 % Final     Platelets   Date Value Ref Range Status   07/15/2020 192 140 - 450 10*3/mm3 Final          Assessment/Plan  This patient has history of pulmonary embolism due to lupus anticoagulant and she has been anticoagulated through her pregnancy. She has completed her 4th pregnancy through . At this time she is receiving heparin. The amount of bleeding at the surgical site is minimal and the amount of bleeding in the vagina from  is expected for this time. The hemoglobin is stable at 10.5. The patient has normal platelet count of 192,000. Otherwise, the patient is feeling well, feeling terrific and she  has no inference of any alteration that would suggest pulmonary embolus.     Given the circumstances, heparin will be continued for another couple of days or so and maybe at the time that she is discharged, if the hemoglobin is stable and the degree of bleeding that she has in the vagina is as expected from somebody with , the patient could be switching to Lovenox.     On the other hand, the anemia is also expected. There is no need for infusion of Venofer and there is no need for transfusion of red cells. The platelet count and white count are normal.     I discussed this with the patient in detail.     Also her obstetrician called me in regard to the authorization to allow them to give her ibuprofen. That would be tremendously useful for  pain. I find no reason why she cannot handle ibuprofen 600 mg for the next 3-5 days.    DURING THE VISIT WITH THE PATIENT TODAY , PATIENT HAD FACE MASK, I HAD PROPPER PROTECTIVE EQUIPMENT, AND I DID HAND HYGIENE WITH SOAP AND WATER BEFORE AND AFTER THE VISIT.                      7/15/2020      CC:

## 2020-07-15 NOTE — PROGRESS NOTES
7/15/2020    Patient: Sandra Valadez   MR#:[id number]        Progress note         HD#1  Post-Op Day 1 S/P    Delivered a female infant.    Subjective     Sandra Valadez is a 25 y.o. female  post operative from CS at 38w4d weeks  Patient reports:  Pain is well controlled. Voiding and ambulating without difficulty.  Tolerating po. Lochia normal.     The patient plans breastfeed    Patient Active Problem List   Diagnosis   • HSV (herpes simplex virus) anogenital infection   • Elevated prolactin level   • Urinary tract infection in mother during pregnancy, antepartum   • Tachycardia   • Dysplasia of cervix, low grade (JENNA 1)   • Group B Streptococcus urinary tract infection affecting pregnancy, antepartum   • Maternal pulmonary embolus (PE), current pregnancy   • Iron deficiency anemia during pregnancy   • Adverse effect of iron and its compounds, initial encounter   • Lupus anticoagulant with hypercoagulable state (CMS/HCC)   • Anticoagulated on heparin   • Previous  delivery affecting pregnancy   •  delivery delivered        Objective      Vital Signs Range for the last 24 hours    Temperature: Temp:  [96.7 °F (35.9 °C)-98.5 °F (36.9 °C)] 98.5 °F (36.9 °C)  Temp Source: Temp src: Oral  BP:  BP: ()/(48-82) 113/76  Pulse:  Heart Rate:  [60-86] 73  Respirations: Resp:  [16-20] 16  Weight:     BMI:  Body mass index is 38.47 kg/m².      I/O last 3 completed shifts:  In: 3320 [P.O.:120; I.V.:3200]  Out: 3909 [Urine:3050; Blood:859]  No intake/output data recorded.     Physical Exam      General Alert and awake, in NAD    CV  Regular rate and rhythm    Lungs  clear to auscultation bilaterally    Abdomen Soft, non-distended, fundus firm,  below umbilicus, appropriately tender    Incision  Dressing clean, dry and intact.    Extremities  no  edema, calves NT    LABS:   Lab Results   Component Value Date    WBC 9.89 07/15/2020    HGB 10.5 (L) 07/15/2020    HCT 30.8 (L)  07/15/2020    MCV 87.5 07/15/2020     07/15/2020         Assessment/Plan       1.  POD #1 S/P C/S:  Hemodynamically stable.  Doing well.   2. PE this pregnancy - cont Heparin 10,000 BID - Hem/Onc following   3. Itching - will add atarax     Plan:  Continue routine care.       Group B Streptococcus urinary tract infection affecting pregnancy, antepartum    Maternal pulmonary embolus (PE), current pregnancy    Lupus anticoagulant with hypercoagulable state (CMS/HCC)    Previous  delivery affecting pregnancy     delivery delivered        Anayeli Mondragon MD  7/15/2020  10:30

## 2020-07-16 LAB
BASOPHILS # BLD AUTO: 0.04 10*3/MM3 (ref 0–0.2)
BASOPHILS NFR BLD AUTO: 0.5 % (ref 0–1.5)
DEPRECATED RDW RBC AUTO: 41.3 FL (ref 37–54)
EOSINOPHIL # BLD AUTO: 0.19 10*3/MM3 (ref 0–0.4)
EOSINOPHIL NFR BLD AUTO: 2.3 % (ref 0.3–6.2)
ERYTHROCYTE [DISTWIDTH] IN BLOOD BY AUTOMATED COUNT: 13.3 % (ref 12.3–15.4)
HCT VFR BLD AUTO: 32.7 % (ref 34–46.6)
HGB BLD-MCNC: 11.1 G/DL (ref 12–15.9)
IMM GRANULOCYTES # BLD AUTO: 0.14 10*3/MM3 (ref 0–0.05)
IMM GRANULOCYTES NFR BLD AUTO: 1.7 % (ref 0–0.5)
LYMPHOCYTES # BLD AUTO: 1.82 10*3/MM3 (ref 0.7–3.1)
LYMPHOCYTES NFR BLD AUTO: 22.2 % (ref 19.6–45.3)
MCH RBC QN AUTO: 29.4 PG (ref 26.6–33)
MCHC RBC AUTO-ENTMCNC: 33.9 G/DL (ref 31.5–35.7)
MCV RBC AUTO: 86.7 FL (ref 79–97)
MONOCYTES # BLD AUTO: 0.41 10*3/MM3 (ref 0.1–0.9)
MONOCYTES NFR BLD AUTO: 5 % (ref 5–12)
NEUTROPHILS NFR BLD AUTO: 5.59 10*3/MM3 (ref 1.7–7)
NEUTROPHILS NFR BLD AUTO: 68.3 % (ref 42.7–76)
NRBC BLD AUTO-RTO: 0 /100 WBC (ref 0–0.2)
PLATELET # BLD AUTO: 202 10*3/MM3 (ref 140–450)
PMV BLD AUTO: 9.9 FL (ref 6–12)
RBC # BLD AUTO: 3.77 10*6/MM3 (ref 3.77–5.28)
UFH PPP CHRO-ACNC: <0.1 IU/ML
WBC # BLD AUTO: 8.19 10*3/MM3 (ref 3.4–10.8)

## 2020-07-16 PROCEDURE — 25010000002 ENOXAPARIN PER 10 MG: Performed by: OBSTETRICS & GYNECOLOGY

## 2020-07-16 PROCEDURE — 99232 SBSQ HOSP IP/OBS MODERATE 35: CPT | Performed by: INTERNAL MEDICINE

## 2020-07-16 PROCEDURE — 85025 COMPLETE CBC W/AUTO DIFF WBC: CPT | Performed by: INTERNAL MEDICINE

## 2020-07-16 PROCEDURE — 25010000002 HEPARIN (PORCINE) PER 1000 UNITS: Performed by: OBSTETRICS & GYNECOLOGY

## 2020-07-16 RX ADMIN — OXYCODONE HYDROCHLORIDE AND ACETAMINOPHEN 2 TABLET: 5; 325 TABLET ORAL at 02:15

## 2020-07-16 RX ADMIN — DOCUSATE SODIUM 100 MG: 100 CAPSULE, LIQUID FILLED ORAL at 08:42

## 2020-07-16 RX ADMIN — HEPARIN SODIUM 10000 UNITS: 5000 INJECTION INTRAVENOUS; SUBCUTANEOUS at 08:41

## 2020-07-16 RX ADMIN — DOCUSATE SODIUM 100 MG: 100 CAPSULE, LIQUID FILLED ORAL at 20:46

## 2020-07-16 RX ADMIN — OXYCODONE HYDROCHLORIDE AND ACETAMINOPHEN 2 TABLET: 5; 325 TABLET ORAL at 20:46

## 2020-07-16 RX ADMIN — IBUPROFEN 600 MG: 600 TABLET ORAL at 22:07

## 2020-07-16 RX ADMIN — OXYCODONE HYDROCHLORIDE AND ACETAMINOPHEN 2 TABLET: 5; 325 TABLET ORAL at 08:49

## 2020-07-16 RX ADMIN — IBUPROFEN 600 MG: 600 TABLET ORAL at 15:41

## 2020-07-16 RX ADMIN — IBUPROFEN 600 MG: 600 TABLET ORAL at 08:49

## 2020-07-16 RX ADMIN — ENOXAPARIN SODIUM 80 MG: 80 INJECTION SUBCUTANEOUS at 20:46

## 2020-07-16 RX ADMIN — IBUPROFEN 600 MG: 600 TABLET ORAL at 02:15

## 2020-07-16 RX ADMIN — OXYCODONE HYDROCHLORIDE AND ACETAMINOPHEN 2 TABLET: 5; 325 TABLET ORAL at 15:41

## 2020-07-16 NOTE — PLAN OF CARE
Problem: Patient Care Overview  Goal: Plan of Care Review  Outcome: Ongoing (interventions implemented as appropriate)  Flowsheets  Taken 7/14/2020 1213 by Dagmar Motta RN  Progress: improving  Taken 7/16/2020 1753 by Nesha Parrish RN  Plan of Care Reviewed With: patient  Note:   Good pain control on oral pain meds. Amb in room ad memo and breastfeeding baby.

## 2020-07-16 NOTE — LACTATION NOTE
Mom latched baby with good deep latch. Encouraged to start latch nose to nipple and BF for 10-15 min on each side every 2-3 hours. Mom reports she gave some formula to baby yesterday because baby didn't have a BM.  Lactation Consult Note    Evaluation Completed: 2020 15:00  Patient Name: Sandra Valadez  :  1995  MRN:  [id number]     REFERRAL  INFORMATION:                          Date of Referral: 07/15/20   Person Making Referral: physician  Maternal Reason for Referral: breastfeeding currently  Infant Reason for Referral: other (see comments)(inadequate output.)    DELIVERY HISTORY:  Infant First Feeding: breastfeeding       Skin to skin initiation date/time: 2020  9:35 AM   Skin to skin end date/time:              MATERNAL ASSESSMENT:                               INFANT ASSESSMENT:  Information for the patient's :  Luis Valadez [[id number]]   No past medical history on file.                                                                                                                                MATERNAL INFANT FEEDING:                                                                       EQUIPMENT TYPE:                                 BREAST PUMPING:          LACTATION REFERRALS:

## 2020-07-16 NOTE — PROGRESS NOTES
2020    Name:Sandra Valadez    MR#:[id number]     Progress Note:  Post-Op day 2 S/P    HD:2    Subjective   25 y.o. yo Female  s/p CS at 38w4d doing well. Pain well controlled. Tolerating regular diet and having flatus. Lochia normal.   No heavy vaginal bleeding noted.     Patient Active Problem List   Diagnosis   • HSV (herpes simplex virus) anogenital infection   • Elevated prolactin level   • Urinary tract infection in mother during pregnancy, antepartum   • Tachycardia   • Dysplasia of cervix, low grade (JENNA 1)   • Group B Streptococcus urinary tract infection affecting pregnancy, antepartum   • Maternal pulmonary embolus (PE), current pregnancy   • Iron deficiency anemia during pregnancy   • Adverse effect of iron and its compounds, initial encounter   • Lupus anticoagulant with hypercoagulable state (CMS/HCC)   • Anticoagulated on heparin   • Previous  delivery affecting pregnancy   •  delivery delivered        Objective    Vitals  Temp:  Temp:  [97.2 °F (36.2 °C)-97.9 °F (36.6 °C)] 97.9 °F (36.6 °C)  Temp src: Oral  BP:  BP: ()/(68-82) 97/68  Pulse:  Heart Rate:  [65-75] 65  RR:   Resp:  [16] 16  Weight:    BMI:  Body mass index is 38.47 kg/m².      General Awake, alert, no distress  Abdomen Soft, non-distended, fundus firm, 2 cm below umbilicus, appropriately tender. Some bruising on abd from heparin inj  Incision  Intact, no erythema or exudate, staples in place.   Extremities Calves NT bilaterally         I/O last 3 completed shifts:  In: 720 [P.O.:720]  Out: 1350 [Urine:1350]    LABS:   Lab Results   Component Value Date    WBC 8.19 2020    HGB 11.1 (L) 2020    HCT 32.7 (L) 2020    MCV 86.7 2020     2020       Infant: female       Assessment   1.  POD 2  2. PE during pregnancy 2/2 lupus anticoagulant- continue heparin and transition to lovenox and agree to switch to lovenox for evening dose. Pt aware of importance  of continuing on lovenox    Plan: Doing well.  Routine postoperative care      Group B Streptococcus urinary tract infection affecting pregnancy, antepartum    Maternal pulmonary embolus (PE), current pregnancy    Lupus anticoagulant with hypercoagulable state (CMS/HCC)    Previous  delivery affecting pregnancy     delivery delivered      Xochitl Montano MD  2020 12:44

## 2020-07-16 NOTE — PROGRESS NOTES
Subjective  POSITIVE LUPUS ANTICOAGULANT  AND PE, COMPLETION OF PREGNANCY BY C SECTION TODAY, RECS IN REGARD FURTHER THERAPY          History of Present Illness 25-year-old white female who has previous history of lupus anticoagulant during pregnancy and pulmonary embolus who has had a  to deliver her 4th baby this morning. The patient is doing fine with no difficulties whatsoever at the time that I have seen her in the hospital room. The patient has had expected clinical bleeding from the vagina, the surgical incision remains clean. The baby is doing terrific. The patient has no pain, no cough, no shortness of breath, no swelling in her lower extremities   On 07/15/2020, the patient was further reviewed and transferred to a regular obstetric floor. She was up and about. The amount of vaginal bleeding was expected for somebody with a . The incision in the abdominal wall was not bleeding and she had no swelling in her lower extremities, chest pain, cough, sputum production or shortness of breath. She was doing breastfeeding at the time that I walked into the room. She had pain in the surgical site and her obstetrician called me requesting permission to proceed with therapy with ibuprofen that was perfectly fine with me.  Interim assessment on 2020 discloses no bleeding at the surgical site in the  area and expected minimal bleeding through the vagina after the . There is no excessive bleeding. The patient's vital signs remain stable. She has not had any fever and she has not had any swelling or pain in her lower extremities, neither cough, sputum production, shortness of breath or pleuritic pain.         Past Medical History, Past Surgical History, Social History, Family History have been reviewed and are without significant changes except as mentioned.    Review of Systems   Constitutional: Negative for activity change.   HENT: Negative.    Respiratory: Negative.     Cardiovascular: Negative.    Gastrointestinal: Negative.    Genitourinary: Positive for pelvic pain.   Musculoskeletal: Negative.    Skin: Negative.    Neurological: Negative.    Hematological: Negative.    Psychiatric/Behavioral: Negative.       Medications:  The current medication list was reviewed in the EMR    ALLERGIES:  No Known Allergies    Objective      Vitals:    07/15/20 0756 07/15/20 1557 07/15/20 2310 07/16/20 0725   BP: 113/76 121/82 109/73 97/68   BP Location: Right arm Right arm Right arm Right arm   Patient Position: Lying Sitting Sitting Lying   Pulse: 73 75 71 65   Resp: 16 16 16 16   Temp: 98.5 °F (36.9 °C) 97.9 °F (36.6 °C) 97.2 °F (36.2 °C) 97.9 °F (36.6 °C)   TempSrc: Oral Oral Oral Oral   SpO2:       Height:         Current Status 7/7/2020   ECOG score 0   exam:           GENERAL ASPECT: IN GOOD HEALTH WALKING THROUGH THE ROOM NO DIFFICULTIES, NO PAIN.PROPER NUTRITION.WELL KEPT PHYSICALLY.  EYES : NOT JAUNDICED, PUPILS WERE EQUAL.  NECK: NO CERVICAL ADENOPATHIES OR MASSES OR THYROID ENLARGEMENT.  HEART: REGULAR ,NO MURMURS , NO GALLOPS, NO RUBS.  ABDOMEN: NOT DISTENDED, postsurgical pain 2/10 , NO LIVER OR SPLEEN ENLARGEMENT, NO ASCITES, NO COLLATERAL CIRCULATION.  EXTREMITIES: NO EDEMA, NO PAIN, NO CLUBBING, NO DEFORMITIES.  NEUROLOGIC: NORMAL CONVERSATION, MEMORY , SPEECH AND GAIT.  SKIN: NO LESIONS.    RECENT LABS:  Hematology WBC   Date Value Ref Range Status   07/16/2020 8.19 3.40 - 10.80 10*3/mm3 Final     RBC   Date Value Ref Range Status   07/16/2020 3.77 3.77 - 5.28 10*6/mm3 Final     Hemoglobin   Date Value Ref Range Status   07/16/2020 11.1 (L) 12.0 - 15.9 g/dL Final     Hematocrit   Date Value Ref Range Status   07/16/2020 32.7 (L) 34.0 - 46.6 % Final     Platelets   Date Value Ref Range Status   07/16/2020 202 140 - 450 10*3/mm3 Final          Assessment/Plan  This patient has history of pulmonary embolism due to lupus anticoagulant and she has been anticoagulated through her  pregnancy. She has completed her 4th pregnancy through . At this time she is receiving heparin. The amount of bleeding at the surgical site is minimal and the amount of bleeding in the vagina from  is expected for this time. The hemoglobin is stable at 10.5. The patient has normal platelet count of 192,000. Otherwise, the patient is feeling well, feeling terrific and she has no inference of any alteration that would suggest pulmonary embolus.     Given the circumstances, heparin will be continued for another couple of days or so and maybe at the time that she is discharged, if the hemoglobin is stable and the degree of bleeding that she has in the vagina is as expected from somebody with , the patient could be switching to Lovenox.     On the other hand, the anemia is also expected. There is no need for infusion of Venofer and there is no need for transfusion of red cells. The platelet count and white count are normal.     I discussed this with the patient in detail.     On 2020 the patient has minimal bleeding at the surgical site and expected minimal bleeding from the  and through the vagina. She has not bled excessively like was the fear. She remains on heparin. From my point of view she could be switched to Lovenox at the previous dose at anytime.    The patient's hemoglobin is 11.2 and that is good news under the present circumstances.    I will go ahead and make an appointment for her to see Jessica Ho MD, in a few weeks and proceed with her laboratory testing.    I pointed out to the patient that the next 6 weeks are very crucial in regard to the continuation of her Lovenox to minimize thrombosis and a new PE. She realizes that.      DURING THE VISIT WITH THE PATIENT TODAY , PATIENT HAD FACE MASK, I HAD PROPPER PROTECTIVE EQUIPMENT, AND I DID HAND HYGIENE WITH SOAP AND WATER BEFORE AND AFTER THE VISIT.                      2020      CC:

## 2020-07-17 VITALS
HEIGHT: 60 IN | SYSTOLIC BLOOD PRESSURE: 109 MMHG | TEMPERATURE: 97.2 F | RESPIRATION RATE: 16 BRPM | OXYGEN SATURATION: 98 % | DIASTOLIC BLOOD PRESSURE: 74 MMHG | HEART RATE: 64 BPM | BODY MASS INDEX: 38.47 KG/M2

## 2020-07-17 DIAGNOSIS — O99.019 IRON DEFICIENCY ANEMIA DURING PREGNANCY: ICD-10-CM

## 2020-07-17 DIAGNOSIS — I26.99 ACUTE PULMONARY EMBOLISM WITHOUT ACUTE COR PULMONALE, UNSPECIFIED PULMONARY EMBOLISM TYPE (HCC): Primary | ICD-10-CM

## 2020-07-17 DIAGNOSIS — D50.9 IRON DEFICIENCY ANEMIA DURING PREGNANCY: ICD-10-CM

## 2020-07-17 DIAGNOSIS — D68.62 LUPUS ANTICOAGULANT WITH HYPERCOAGULABLE STATE (HCC): ICD-10-CM

## 2020-07-17 PROCEDURE — 99231 SBSQ HOSP IP/OBS SF/LOW 25: CPT | Performed by: INTERNAL MEDICINE

## 2020-07-17 PROCEDURE — 0503F POSTPARTUM CARE VISIT: CPT | Performed by: OBSTETRICS & GYNECOLOGY

## 2020-07-17 PROCEDURE — 25010000002 ENOXAPARIN PER 10 MG: Performed by: OBSTETRICS & GYNECOLOGY

## 2020-07-17 RX ORDER — IBUPROFEN 600 MG/1
600 TABLET ORAL EVERY 6 HOURS PRN
Qty: 30 TABLET | Refills: 3 | Status: SHIPPED | OUTPATIENT
Start: 2020-07-17 | End: 2020-09-17

## 2020-07-17 RX ORDER — OXYCODONE HYDROCHLORIDE AND ACETAMINOPHEN 5; 325 MG/1; MG/1
1 TABLET ORAL EVERY 4 HOURS PRN
Qty: 20 TABLET | Refills: 0 | Status: SHIPPED | OUTPATIENT
Start: 2020-07-17 | End: 2020-07-24

## 2020-07-17 RX ADMIN — DOCUSATE SODIUM 100 MG: 100 CAPSULE, LIQUID FILLED ORAL at 08:57

## 2020-07-17 RX ADMIN — OXYCODONE HYDROCHLORIDE AND ACETAMINOPHEN 2 TABLET: 5; 325 TABLET ORAL at 11:51

## 2020-07-17 RX ADMIN — OXYCODONE HYDROCHLORIDE AND ACETAMINOPHEN 2 TABLET: 5; 325 TABLET ORAL at 07:40

## 2020-07-17 RX ADMIN — OXYCODONE HYDROCHLORIDE AND ACETAMINOPHEN 2 TABLET: 5; 325 TABLET ORAL at 01:59

## 2020-07-17 RX ADMIN — IBUPROFEN 600 MG: 600 TABLET ORAL at 07:40

## 2020-07-17 RX ADMIN — ENOXAPARIN SODIUM 80 MG: 80 INJECTION SUBCUTANEOUS at 08:57

## 2020-07-17 NOTE — PROGRESS NOTES
2020    Name:Sandra Valadez    MR#:[id number]     Progress Note:  Post-Op 3 S/P    HD:3    Subjective   25 y.o. yo Female  s/p CS at 38w4d doing well. Pain well controlled. Tolerating regular diet and having flatus. Lochia normal.     Patient Active Problem List   Diagnosis   • HSV (herpes simplex virus) anogenital infection   • Elevated prolactin level   • Urinary tract infection in mother during pregnancy, antepartum   • Tachycardia   • Dysplasia of cervix, low grade (JENNA 1)   • Group B Streptococcus urinary tract infection affecting pregnancy, antepartum   • Maternal pulmonary embolus (PE), current pregnancy   • Iron deficiency anemia during pregnancy   • Adverse effect of iron and its compounds, initial encounter   • Lupus anticoagulant with hypercoagulable state (CMS/HCC)   • Anticoagulated on heparin   • Previous  delivery affecting pregnancy   •  delivery delivered        Objective    Vitals  Temp:  Temp:  [97.2 °F (36.2 °C)-98.6 °F (37 °C)] 97.2 °F (36.2 °C)  Temp src: Oral  BP:  BP: (103-113)/(67-76) 109/74  Pulse:  Heart Rate:  [57-64] 64  RR:   Resp:  [16] 16  Weight:    BMI:  Body mass index is 38.47 kg/m².    General Awake, alert, no distress  Abdomen Soft, non-distended, fundus firm, 3 below umbilicus, appropriately tender  Incision  Intact, no erythema or exudate  Extremities Calves NT bilaterally     I/O last 3 completed shifts:  In: 720 [P.O.:720]  Out: -     LABS:   Lab Results   Component Value Date    WBC 8.19 2020    HGB 11.1 (L) 2020    HCT 32.7 (L) 2020    MCV 86.7 2020     2020       Infant: female       Assessment    1.  POD 3    2.  History of pulmonary embolus likely related to positive lupus anticoagulant  -Patient is on Lovenox     Plan: Doing well.  Routine postoperative care      Group B Streptococcus urinary tract infection affecting pregnancy, antepartum    Maternal pulmonary embolus (PE), current  pregnancy    Lupus anticoagulant with hypercoagulable state (CMS/[facility])    Previous  delivery affecting pregnancy     delivery delivered      Zan Whitlock MD  2020 09:14

## 2020-07-17 NOTE — DISCHARGE SUMMARY
Section Discharge Summary    Date of Admission: 2020  Date of Discharge:  2020      Patient: Sandra Valadez      MR#:[id number]    Surgeon/OB: Noemi Mittal MD    Discharge Diagnosis:    section at 38w4d, uncomplicated recovery  History of pulmonary embolus during pregnancy  Iron deficiency anemia in pregnancy  Lupus anticoagulant disorder      Procedures:  , Low Transverse     2020    9:21 AM      Anesthesia:  Spinal     Presenting Problem/History of Present Illness  History of  delivery [Z98.891]  Request for sterilization [Z30.2]  Previous  delivery affecting pregnancy [O34.219]     Patient Active Problem List   Diagnosis   • HSV (herpes simplex virus) anogenital infection   • Elevated prolactin level   • Urinary tract infection in mother during pregnancy, antepartum   • Tachycardia   • Dysplasia of cervix, low grade (JENNA 1)   • Group B Streptococcus urinary tract infection affecting pregnancy, antepartum   • Maternal pulmonary embolus (PE), current pregnancy   • Iron deficiency anemia during pregnancy   • Adverse effect of iron and its compounds, initial encounter   • Lupus anticoagulant with hypercoagulable state (CMS/HCC)   • Anticoagulated on heparin   • Previous  delivery affecting pregnancy   •  delivery delivered       Hospital Course  Patient is a 25 y.o. female  at 38w4d status post  section with uneventful postoperative recovery.  Patient was advanced to regular diet on postoperative day#1.  On discharge, ambulating, tolerating a regular diet without any difficulties and her incision is dry, clean and intact.     The patient has a history of pulmonary embolus during pregnancy.  She was treated with fractionated heparin in the third trimester.  Hematology recommended she resume Lovenox 1 mg/kg twice daily for the 6-week.  Postpartum    Infant:   female  fetus 3360 g (7 lb 6.5 oz)  with Apgar scores of 8  ,  "9   at five minutes.    Condition on Discharge:  Stable    Vital Signs  Temp:  [97.2 °F (36.2 °C)-98.6 °F (37 °C)] 97.2 °F (36.2 °C)  Heart Rate:  [57-64] 64  Resp:  [16] 16  BP: (103-113)/(67-76) 109/74    Lab Results   Component Value Date    WBC 8.19 07/16/2020    HGB 11.1 (L) 07/16/2020    HCT 32.7 (L) 07/16/2020    MCV 86.7 07/16/2020     07/16/2020       Discharge Disposition  Home or Self Care    Discharge Medications     Your medication list      START taking these medications      Instructions Last Dose Given Next Dose Due   enoxaparin 80 MG/0.8ML solution syringe  Commonly known as:  Lovenox      Inject 0.8 mL under the skin into the appropriate area as directed Every 12 (Twelve) Hours.       ibuprofen 600 MG tablet  Commonly known as:  ADVIL,MOTRIN      Take 1 tablet by mouth Every 6 (Six) Hours As Needed for Mild Pain  or Moderate Pain .       oxyCODONE-acetaminophen 5-325 MG per tablet  Commonly known as:  PERCOCET      Take 1 tablet by mouth Every 4 (Four) Hours As Needed for Moderate Pain  for up to 7 days.          CONTINUE taking these medications      Instructions Last Dose Given Next Dose Due   albuterol sulfate  (90 Base) MCG/ACT inhaler  Commonly known as:  PROVENTIL HFA;VENTOLIN HFA;PROAIR HFA      Inhale 2 puffs Every 4 (Four) Hours As Needed for Wheezing.       famotidine 20 MG tablet  Commonly known as:  PEPCID      Take 20 mg by mouth 2 (Two) Times a Day.       Needle (Disp) 30G X 1/2\" misc      0.5 mL 2 (Two) Times a Day.       ondansetron ODT 4 MG disintegrating tablet  Commonly known as:  ZOFRAN-ODT      ONE EVERY 8 HOURS AS NEEDED FOR NAUSEA       prenatal (CLASSIC) vitamin  tablet  Generic drug:  prenatal vitamin      Take 1 tablet by mouth Daily.          STOP taking these medications    heparin (porcine) 42235 UNIT/ML injection              Where to Get Your Medications      These medications were sent to [facility] - [address] " [id number]    Hours:  7:00AM-6PM Mon-Fri Phone:  [phone]   · enoxaparin 80 MG/0.8ML solution syringe  · ibuprofen 600 MG tablet  · oxyCODONE-acetaminophen 5-325 MG per tablet           Discharge Diet: Regular    Follow-up Appointments  Future Appointments   Date Time Provider Department Center   8/3/2020 10:50 AM LAB CHAIR [facility]   8/3/2020 11:40 AM Jessica Ho MD PhD [facility]     Additional Instructions for the Follow-ups that You Need to Schedule     Call MD for problems / concerns.   As directed      Instructions:    1.  Call for heavy vaginal bleeding (greater than 2 pads an hours for 2 hours)  2.  Fever above 101.3  3.  Incisional redness    Order Comments:  Instructions:  1.  Call for heavy vaginal bleeding (greater than 2 pads an hours for 2 hours) 2.  Fever above 101.3 3.  Incisional redness                Prenatal labs/vax: O pos  RI  Varicella:  vaccinated   Immunization History   Administered Date(s) Administered   • Hepatitis A 11/24/2014   • Influenza, Unspecified 10/01/2019   • Tdap 07/09/2015, 06/16/2020       External Prenatal Results     Pregnancy Outside Results - Transcribed From Office Records - See Scanned Records For Details     Test Value Date Time    Hgb 11.1 g/dL 07/16/20 0854      10.5 g/dL 07/15/20 0732      12.5 g/dL 07/14/20 0633      11.9 g/dL 07/07/20 1018      11.9 g/dL 07/02/20 1026      12.5 g/dL 06/23/20 1306      12.2 g/dL 05/26/20 1325      11.6 g/dL 04/16/20 1038      10.7 g/dL 03/27/20 1406      11.3 g/dL 03/16/20 1317      11.7 g/dL 03/13/20 1252      10.7 g/dL 03/08/20 0534      11.7 g/dL 03/07/20 0825      12.1 g/dL 02/03/20 1241      12.9 g/dL 11/22/19 1512    Hct 32.7 % 07/16/20 0854      30.8 % 07/15/20 0732      36.1 % 07/14/20 0633      35.7 % 07/07/20 1018      36.0 % 07/02/20 1026      38.5 % 06/23/20 1306      36.9 % 05/26/20 1325      34.8 % 04/16/20 1038      31.8 % 03/27/20 1406      32.8 % 03/16/20 1317      34.5 % 03/13/20  1252      31.3 % 03/08/20 0534      34.7 % 03/07/20 0825      36.4 % 02/03/20 1241      38.6 % 11/22/19 1512    ABO O  07/14/20 0633    Rh Positive  07/14/20 0633    Antibody Screen Negative  07/14/20 0633      Negative  03/07/20 1527      Negative  11/22/19 1512    Glucose Fasting GTT       Glucose Tolerance Test 1 hour       Glucose Tolerance Test 3 hour       Gonorrhea (discrete) Negative  12/09/19 1510    Chlamydia (discrete) Negative  12/09/19 1510    RPR Non Reactive  11/22/19 1512    VDRL       Syphilis Antibody       Rubella 1.24 index 11/22/19 1512    HBsAg Negative  11/22/19 1512    Herpes Simplex Virus PCR       Herpes Simplex VIrus Culture       HIV Non Reactive  11/22/19 1512    Hep C RNA Quant PCR       Hep C Antibody <0.1 s/co ratio 11/22/19 1512    AFP 44.0 ng/mL 02/17/20 1014    Group B Strep       GBS Susceptibility to Clindamycin       GBS Susceptibility to Erythromycin       Fetal Fibronectin       Genetic Testing, Maternal Blood             Drug Screening     Test Value Date Time    Urine Drug Screen       Amphetamine Screen       Barbiturate Screen       Benzodiazepine Screen       Methadone Screen       Phencyclidine Screen       Opiates Screen       THC Screen       Cocaine Screen       Propoxyphene Screen       Buprenorphine Screen       Methamphetamine Screen       Oxycodone Screen       Tricyclic Antidepressants Screen                     Zan Whitlock MD  7/17/2020  10:43

## 2020-07-17 NOTE — PROGRESS NOTES
Subjective  POSITIVE LUPUS ANTICOAGULANT  AND PE, COMPLETION OF PREGNANCY BY C SECTION TODAY, RECS IN REGARD FURTHER THERAPY          History of Present Illness 25-year-old white female who has previous history of lupus anticoagulant during pregnancy and pulmonary embolus who has had a  to deliver her 4th baby this morning. The patient is doing fine with no difficulties whatsoever at the time that I have seen her in the hospital room. The patient has had expected clinical bleeding from the vagina, the surgical incision remains clean. The baby is doing terrific. The patient has no pain, no cough, no shortness of breath, no swelling in her lower extremities   On 07/15/2020, the patient was further reviewed and transferred to a regular obstetric floor. She was up and about. The amount of vaginal bleeding was expected for somebody with a . The incision in the abdominal wall was not bleeding and she had no swelling in her lower extremities, chest pain, cough, sputum production or shortness of breath. She was doing breastfeeding at the time that I walked into the room. She had pain in the surgical site and her obstetrician called me requesting permission to proceed with therapy with ibuprofen that was perfectly fine with me.  Interim assessment on 2020 discloses no bleeding at the surgical site in the  area and expected minimal bleeding through the vagina after the . There is no excessive bleeding. The patient's vital signs remain stable. She has not had any fever and she has not had any swelling or pain in her lower extremities, neither cough, sputum production, shortness of breath or pleuritic pain.     On 2020, the patient was not having any significant clinical bleeding through the vagina, neither at the surgical site of her .  She had no swelling in her lower extremities and no shortness of breath. She was packing already to go home.        Past Medical  History, Past Surgical History, Social History, Family History have been reviewed and are without significant changes except as mentioned.    Review of Systems   Constitutional: Negative for activity change.   HENT: Negative.    Respiratory: Negative.    Cardiovascular: Negative.    Gastrointestinal: Negative.    Genitourinary: Positive for pelvic pain.   Musculoskeletal: Negative.    Skin: Negative.    Neurological: Negative.    Hematological: Negative.    Psychiatric/Behavioral: Negative.       Medications:  The current medication list was reviewed in the EMR    ALLERGIES:  No Known Allergies    Objective      Vitals:    07/16/20 0725 07/16/20 1526 07/16/20 2305 07/17/20 0713   BP: 97/68 103/67 113/76 109/74   BP Location: Right arm Right arm Left arm Left arm   Patient Position: Lying Lying Sitting Lying   Pulse: 65 57 62 64   Resp: 16 16 16 16   Temp: 97.9 °F (36.6 °C) 98.6 °F (37 °C) 97.8 °F (36.6 °C) 97.2 °F (36.2 °C)   TempSrc: Oral Oral Oral Oral   SpO2:       Height:         Current Status 7/7/2020   ECOG score 0   exam:           GENERAL ASPECT: IN GOOD HEALTH WALKING THROUGH THE ROOM NO DIFFICULTIES, NO PAIN.PROPER NUTRITION.WELL KEPT PHYSICALLY.  EYES : NOT JAUNDICED, PUPILS WERE EQUAL.  NECK: NO CERVICAL ADENOPATHIES OR MASSES OR THYROID ENLARGEMENT.  HEART: REGULAR ,NO MURMURS , NO GALLOPS, NO RUBS.  ABDOMEN: NOT DISTENDED, postsurgical pain 2/10 , NO LIVER OR SPLEEN ENLARGEMENT, NO ASCITES, NO COLLATERAL CIRCULATION.  EXTREMITIES: NO EDEMA, NO PAIN, NO CLUBBING, NO DEFORMITIES.  NEUROLOGIC: NORMAL CONVERSATION, MEMORY , SPEECH AND GAIT.  SKIN: NO LESIONS.    RECENT LABS:  Hematology WBC   Date Value Ref Range Status   07/16/2020 8.19 3.40 - 10.80 10*3/mm3 Final     RBC   Date Value Ref Range Status   07/16/2020 3.77 3.77 - 5.28 10*6/mm3 Final     Hemoglobin   Date Value Ref Range Status   07/16/2020 11.1 (L) 12.0 - 15.9 g/dL Final     Hematocrit   Date Value Ref Range Status   07/16/2020 32.7 (L) 34.0  - 46.6 % Final     Platelets   Date Value Ref Range Status   2020 202 140 - 450 10*3/mm3 Final          Assessment/Plan  This patient has history of pulmonary embolism due to lupus anticoagulant and she has been anticoagulated through her pregnancy. She has completed her 4th pregnancy through . At this time she is receiving heparin. The amount of bleeding at the surgical site is minimal and the amount of bleeding in the vagina from  is expected for this time. The hemoglobin is stable at 10.5. The patient has normal platelet count of 192,000. Otherwise, the patient is feeling well, feeling terrific and she has no inference of any alteration that would suggest pulmonary embolus.     Given the circumstances, heparin will be continued for another couple of days or so and maybe at the time that she is discharged, if the hemoglobin is stable and the degree of bleeding that she has in the vagina is as expected from somebody with , the patient could be switching to Lovenox.     On the other hand, the anemia is also expected. There is no need for infusion of Venofer and there is no need for transfusion of red cells. The platelet count and white count are normal.     I discussed this with the patient in detail.     On 2020 the patient has minimal bleeding at the surgical site and expected minimal bleeding from the  and through the vagina. She has not bled excessively like was the fear. She remains on heparin. From my point of view she could be switched to Lovenox at the previous dose at anytime.    The patient's hemoglobin is 11.2 and that is good news under the present circumstances.    I will go ahead and make an appointment for her to see Jessica Ho MD, in a few weeks and proceed with her laboratory testing.    I pointed out to the patient that the next 6 weeks are very crucial in regard to the continuation of her Lovenox to minimize thrombosis and a new PE. She realizes  that.  On 07/17/2020, the patient was advised about the continuation of her anticoagulation and she will require new doses of Lovenox that will be sent by me to her pharmacy. She already has appointment to come back and have blood work in 5 weeks and review with Dr. Ho in 6 weeks.     It is perfectly fine for her to go home. I discussed this with her and her .        DURING THE VISIT WITH THE PATIENT TODAY , PATIENT HAD FACE MASK, I HAD PROPPER PROTECTIVE EQUIPMENT, AND I DID HAND HYGIENE WITH SOAP AND WATER BEFORE AND AFTER THE VISIT.                      7/17/2020      CC:

## 2020-07-17 NOTE — TELEPHONE ENCOUNTER
Staff message from Dr. Burrell:  Hematology recommended she resume Lovenox 1 mg/kg twice daily for the 6-week.  Postpartum.   Can we send this prescription to her pharmacy please tks     Looks like Zan Whitlock MD sent in Lovenox 80mg SQ BID for 6 weeks.     Message sent back to Dr. Burrell informing him of this and to let us know if we needed to resend or there were any issues with this drug.

## 2020-07-29 ENCOUNTER — POSTPARTUM VISIT (OUTPATIENT)
Dept: OBSTETRICS AND GYNECOLOGY | Age: 25
End: 2020-07-29

## 2020-07-29 VITALS
SYSTOLIC BLOOD PRESSURE: 100 MMHG | DIASTOLIC BLOOD PRESSURE: 60 MMHG | WEIGHT: 178.4 LBS | BODY MASS INDEX: 35.02 KG/M2 | HEIGHT: 60 IN

## 2020-07-29 DIAGNOSIS — O88.219 MATERNAL PULMONARY EMBOLUS (PE), CURRENT PREGNANCY: ICD-10-CM

## 2020-07-29 DIAGNOSIS — Z80.3 FAMILY HISTORY OF BREAST CANCER: ICD-10-CM

## 2020-07-29 DIAGNOSIS — N87.0 DYSPLASIA OF CERVIX, LOW GRADE (CIN 1): ICD-10-CM

## 2020-07-29 PROBLEM — O34.219 PREVIOUS CESAREAN DELIVERY AFFECTING PREGNANCY: Status: RESOLVED | Noted: 2020-07-14 | Resolved: 2020-07-29

## 2020-07-29 PROCEDURE — 0503F POSTPARTUM CARE VISIT: CPT | Performed by: OBSTETRICS & GYNECOLOGY

## 2020-07-29 NOTE — PROGRESS NOTES
"Chief complaint:incision/postpartum check    HPI  Sandra Valadez is a 25 y.o. female presents for incision check. She denies any excessive bleeding or pain. She reports her incision is healing well. She denies depressed mood.         The following portions of the patient's history were reviewed and updated as appropriate: allergies, current medications, past family history, past medical history, past social history, past surgical history and problem list.    Review of Systems  Pertinent items are noted in HPI.    /60   Ht 152.4 cm (60\")   Wt 80.9 kg (178 lb 6.4 oz)   LMP 10/18/2019   Breastfeeding Yes   BMI 34.84 kg/m²         Physical Exam   Constitutional: She is oriented to person, place, and time. She appears well-developed and well-nourished.   Pulmonary/Chest: Effort normal and breath sounds normal.   Abdominal: Soft. There is no tenderness. There is no guarding.   Incision clean/dry/intact, no erythema or discharge; no swelling   Neurological: She is alert and oriented to person, place, and time.   Psychiatric: She has a normal mood and affect. Her behavior is normal.           Sandra was seen today for postpartum care.    Diagnoses and all orders for this visit:    Postpartum care and examination    Family history of breast cancer  -     [facility] Hereditary Cancer Screen    Maternal pulmonary embolus (PE), current pregnancy    Dysplasia of cervix, low grade (JENNA 1)      Normal postpartum incision check, plan f/u 4 weeks. Pt desires Kyleena for contraception. Plan placement at 8-10 weeks pp. Advised she abstain until then.     Pt is on lovenox 80 mg SC twice daily. She has f/u appt with hematology. She will continue lovenox for atleast 6 weeks pp. Pt will confirm that her hematologist is comfortable with her having Mirena    Recommended My Risk testing and pt agrees due to family hx. Her PGM is  and she does not believe her aunt would proceed.    Plan pap at f/u due to hx JENNA 1 prior " to pregnancy

## 2020-08-04 ENCOUNTER — DOCUMENTATION (OUTPATIENT)
Dept: OBSTETRICS AND GYNECOLOGY | Age: 25
End: 2020-08-04

## 2020-08-05 ENCOUNTER — TELEPHONE (OUTPATIENT)
Dept: ONCOLOGY | Facility: CLINIC | Age: 25
End: 2020-08-05

## 2020-08-05 NOTE — TELEPHONE ENCOUNTER
CALLED PT WITH UPDATED APPTS - PT IS COMING IN ON Friday 8/4/20 FOR HER LABS - cbc and ferritin, iron levels then.  THEN SHE WILL SEE DR JETER ON 8/14/20.

## 2020-08-05 NOTE — TELEPHONE ENCOUNTER
PT called to more 9/1 and 9/8 appt closer as the pt cannot begins her Birth control until she is seen by Dr Ho. Please call pt @[phone]

## 2020-08-06 ENCOUNTER — TELEPHONE (OUTPATIENT)
Dept: OBSTETRICS AND GYNECOLOGY | Age: 25
End: 2020-08-06

## 2020-08-06 NOTE — TELEPHONE ENCOUNTER
LMTC, need additional info for Genetic test to be completed.  I also called [employer] testing rep:  @ [phone], [phone]  LM (unable to reach ot X3 days.  Please advise.

## 2020-08-07 ENCOUNTER — LAB (OUTPATIENT)
Dept: LAB | Facility: HOSPITAL | Age: 25
End: 2020-08-07

## 2020-08-07 DIAGNOSIS — O99.019 IRON DEFICIENCY ANEMIA DURING PREGNANCY: ICD-10-CM

## 2020-08-07 DIAGNOSIS — D50.9 IRON DEFICIENCY ANEMIA DURING PREGNANCY: ICD-10-CM

## 2020-08-07 LAB
BASOPHILS # BLD AUTO: 0.02 10*3/MM3 (ref 0–0.2)
BASOPHILS NFR BLD AUTO: 0.3 % (ref 0–1.5)
DEPRECATED RDW RBC AUTO: 42.9 FL (ref 37–54)
EOSINOPHIL # BLD AUTO: 0.17 10*3/MM3 (ref 0–0.4)
EOSINOPHIL NFR BLD AUTO: 2.7 % (ref 0.3–6.2)
ERYTHROCYTE [DISTWIDTH] IN BLOOD BY AUTOMATED COUNT: 13 % (ref 12.3–15.4)
FERRITIN SERPL-MCNC: 231 NG/ML (ref 13–150)
HCT VFR BLD AUTO: 41.3 % (ref 34–46.6)
HGB BLD-MCNC: 13.5 G/DL (ref 12–15.9)
IMM GRANULOCYTES # BLD AUTO: 0.01 10*3/MM3 (ref 0–0.05)
IMM GRANULOCYTES NFR BLD AUTO: 0.2 % (ref 0–0.5)
IRON 24H UR-MRATE: 107 MCG/DL (ref 37–145)
IRON SATN MFR SERPL: 31 % (ref 20–50)
LYMPHOCYTES # BLD AUTO: 2.24 10*3/MM3 (ref 0.7–3.1)
LYMPHOCYTES NFR BLD AUTO: 35.7 % (ref 19.6–45.3)
MCH RBC QN AUTO: 29.4 PG (ref 26.6–33)
MCHC RBC AUTO-ENTMCNC: 32.7 G/DL (ref 31.5–35.7)
MCV RBC AUTO: 90 FL (ref 79–97)
MONOCYTES # BLD AUTO: 0.48 10*3/MM3 (ref 0.1–0.9)
MONOCYTES NFR BLD AUTO: 7.7 % (ref 5–12)
NEUTROPHILS NFR BLD AUTO: 3.35 10*3/MM3 (ref 1.7–7)
NEUTROPHILS NFR BLD AUTO: 53.4 % (ref 42.7–76)
NRBC BLD AUTO-RTO: 0 /100 WBC (ref 0–0.2)
PLATELET # BLD AUTO: 366 10*3/MM3 (ref 140–450)
PMV BLD AUTO: 9.6 FL (ref 6–12)
RBC # BLD AUTO: 4.59 10*6/MM3 (ref 3.77–5.28)
TIBC SERPL-MCNC: 347 MCG/DL (ref 298–536)
TRANSFERRIN SERPL-MCNC: 233 MG/DL (ref 200–360)
WBC # BLD AUTO: 6.27 10*3/MM3 (ref 3.4–10.8)

## 2020-08-07 PROCEDURE — 84466 ASSAY OF TRANSFERRIN: CPT | Performed by: INTERNAL MEDICINE

## 2020-08-07 PROCEDURE — 85025 COMPLETE CBC W/AUTO DIFF WBC: CPT

## 2020-08-07 PROCEDURE — 36415 COLL VENOUS BLD VENIPUNCTURE: CPT

## 2020-08-07 PROCEDURE — 83540 ASSAY OF IRON: CPT | Performed by: INTERNAL MEDICINE

## 2020-08-07 PROCEDURE — 82728 ASSAY OF FERRITIN: CPT | Performed by: INTERNAL MEDICINE

## 2020-08-13 ENCOUNTER — TELEPHONE (OUTPATIENT)
Dept: OBSTETRICS AND GYNECOLOGY | Age: 25
End: 2020-08-13

## 2020-08-14 ENCOUNTER — APPOINTMENT (OUTPATIENT)
Dept: LAB | Facility: HOSPITAL | Age: 25
End: 2020-08-14

## 2020-08-14 ENCOUNTER — OFFICE VISIT (OUTPATIENT)
Dept: ONCOLOGY | Facility: CLINIC | Age: 25
End: 2020-08-14

## 2020-08-14 VITALS
HEIGHT: 60 IN | TEMPERATURE: 97.8 F | BODY MASS INDEX: 34.75 KG/M2 | RESPIRATION RATE: 14 BRPM | WEIGHT: 177 LBS | DIASTOLIC BLOOD PRESSURE: 82 MMHG | HEART RATE: 87 BPM | OXYGEN SATURATION: 96 % | SYSTOLIC BLOOD PRESSURE: 133 MMHG

## 2020-08-14 DIAGNOSIS — O88.212 PULMONARY EMBOLISM COMPLICATING PREGNANCY IN SECOND TRIMESTER: ICD-10-CM

## 2020-08-14 DIAGNOSIS — D50.9 IRON DEFICIENCY ANEMIA DURING PREGNANCY: ICD-10-CM

## 2020-08-14 DIAGNOSIS — D68.62 LUPUS ANTICOAGULANT WITH HYPERCOAGULABLE STATE (HCC): Primary | ICD-10-CM

## 2020-08-14 DIAGNOSIS — O99.019 IRON DEFICIENCY ANEMIA DURING PREGNANCY: ICD-10-CM

## 2020-08-14 PROCEDURE — 99214 OFFICE O/P EST MOD 30 MIN: CPT | Performed by: INTERNAL MEDICINE

## 2020-08-14 NOTE — PROGRESS NOTES
.     REASONS FOR FOLLOW-UP:       1. Pulmonary emboli involving the right main pulmonary artery during her pregnancy at 21 weeks.  Patient was started on Lovenox injection 80 mg q.12 h.   · Hypercoagulable workup without protein S or protein C profile on 3/16/2020 showed positive lupus anticoagulant and a marginally low Antithrombin III activity 82%.   · Patient was started on self injection of unfractionated heparin 20,000 units every 12 hours starting at 36-week pregnancy.  · Converted back to Lovenox injection postpartum.  2.  Iron deficiency anemia.   · Unable to tolerate oral iron treatment once a day because of constipation during pregnancy.    · Patient received 3 doses of IV Venofer 300 mg each on 3/25/2020, 2020, and 2020.  · Recurrent iron deficiency anemia again on 2020.  Patient was given Venofer 300 mg on 2020 and 2020.    HISTORY OF PRESENT ILLNESS:  The patient is a 25 y.o. female with the above-mentioned history who presents today for 1 month follow-up and lab review.     Patient had a successful  for delivery of a healthy baby girl on 2020.  Postpartum, she was switched back to Lovenox anticoagulation.     Patient is on maternity leave at this time. She states that she is 1 month out from her , she states that the baby is healthy.  Patient reports no easy bleeding or bruising.  She is doing well, excellent performance status ECOG 0.    Patient is also interested in liposuction, other plastic surgery in the future.      Patient is also planning on getting IUD implantation in the future.      Laboratory studies today, 2020, show normal CBC with Hb 13.5, platelets 366,000 and a WBC 6270.  She also has normalized iron study with ferritin 231 and iron saturation 31% with free iron 107 TIBC 347.      Past Medical History:   Diagnosis Date   • Abnormal Pap smear of cervix    • Anemia     during pregnancy, received iron infusions    • Chlamydia     at  age 14 ,   • H/O Depressive disorder    • Herpes     last outbreak    • History of anxiety    • History of urinary tract infection    • Pulmonary embolism (CMS/HCC) 2020    during pregnancy     Past Surgical History:   Procedure Laterality Date   •  SECTION  , , 2015    x3    •  SECTION N/A 2020    Procedure:  SECTION REPEAT;  Surgeon: Noemi Mittal MD;  Location: [facility] LABOR DELIVERY;  Service: Obstetrics/Gynecology;  Laterality: N/A;     ONCOLOGIC/HEMATOLOGIC HISTORY: (History from previous dates can be found in the separate document.)   The patient is a 25 y.o. female who presented for initial evaluation on 3/16/2020, referred by her GYN specialist, Dr. Mittal, because of newly diagnosed pulmonary emboli.     Patient reports she had episodes of flu back in 2020. She had lingering issues with some dyspnea. Patient also reported she had tachycardia associated with her shortness of breath. She was seen by Cardiology service and thought related to her pregnancy.  She did have worsening problem and eventually had CT angiogram study on 2020 when she was evaluated in the emergency room and admitted to the hospital for worsening dyspnea. This study reported several small pulmonary emboli in the adjacent branches of the right main pulmonary artery. There were no lung lesions. The upper abdomen showed unremarkable liver, spleen and adrenal glands. Patient was started on Lovenox every 12 hours anticoagulation.     Patient reports bruising at the site of Lovenox injection. She currently uses 80 mg q.12 h. She reports no spontaneous epistaxis or gum bleeding or any other bleeding problem.     As of 3/16/2020, patient was 22 and 1/2 weeks pregnant.  She reports no previous history of thrombophilia.  She had previous 3 C-sections with successful pregnancies. She reports previous use of birth control pills for about 3 years without problem.  She also has no family  history for thrombophilia.     Patient recently was started on oral iron treatment during her hospitalization in early 3/2020.  This patient had normal laboratory study, CBC on 11/22/2019 with hemoglobin 12.9, MCV 87 and platelets 394,000, WBC 6600.  Her labs on 02/03/2020 reported hemoglobin 12.1, MCV 88.8, platelets 294,000 and WBC 8550 including ANC 5630, lymphocytes 2170.  She had normal TSH 1.66 that day. On 03/07/2020, she had hemoglobin 11.7.  On 03/08/2020, her hemoglobin was 10.7 with MCV 88.7, MCHC 34.2.  Normal platelets and WBC.     Patient reports she was not able to tolerate oral iron once a day. She has not been taking iron for the past few days. She reports constipation secondary to oral iron treatment. Patient also reports previously she was given oral iron treatment during her 1st pregnancy and she did not tolerate it.     Laboratory study on 3/16/2020 reported hemoglobin 11.3, MCV 87.7, MCHC 34.5. Platelets 248,000 and WBC 9300 including ANC 5920, lymphocytes 2360.  Iron saturation 9% on 3/16/2020, and clearly patient has iron deficiency.      Hypercoagulable workup, without protein S or protein C profile, on 3/16/2020 showed positive for lupus anticoagulant, however negative for anticardiolipin antibodies, anti-beta-2 glycoprotein 1 antibodies, and a negative for antiphospholipid antibody panel #2.  She had a marginal Antithrombin activity 82%, negative for factor II prothrombin mutation and negative for factor V Leiden mutation.     Patient received 3 doses of IV Venofer 300 mg each on 3/25/2020, 4/1/2020, and 4/8/2020 with good tolerance.  She reports that her energy level moderately improved with the IV iron therapy, and she no longer has pica for ice chips.    The patient presented to the [facility] Obstetric ER on 3/27/2020 complaining of progressive shortness of breath over the prior week.  She was placed on a pulse/oximeter and was noted to have a pulse of 70-80 with O2 saturation of %  during her stay in the ED.  Her chest exam was normal, and her laboratory studies showed hemoglobin 10.7.  She was discharged home under stable conditions and was advised to follow up with Dr. Mittal, her Ob/Gyn, for further evaluation.    On 3/20/2020, Dr. Hinds, at [facility], recommended changing the patient to heparin at approximately 35 weeks pregnancy, rather than continuing lovenox.     Laboratory studies on 2020 showed normalized hemoglobin 12.2, and improved iron saturation 24%, ferritin 33.1, free iron 126 and a TIBC 528.  Maintains normal WBC 9380, and platelets 211,000.     On 2020 she presented today for 4-week re-evaluation with laboratory review.    Patient is currently 35 and a half weeks pregnant.  She continues to use Lovenox injections 80 mg q.12 h.  Patient reports bruising at the site of Lovenox injection, but she reports no spontaneous epistaxis or gum bleeding or any other bleeding problem.  She continues to follow up with Dr. Mittal, and is scheduled to have her  section done on 2020.  Patient has had 3 prior  sections done.      She is not taking oral iron at this time and was previously unable to tolerate oral iron treatment once a day due to nausea or constipation.  She reports that she has only tried supplemental iron while she was previously pregnant.    Patient currently has rapid pulse of 108 and her O2 saturation is 98% with /73.     Laboratory studies from, 2020, showed normal hemoglobin 12.5 MCV 93.2, platelets 227,000 and WBC 10,200 including ANC 7280 lymphocytes 2000.  Iron studies pending.     Patient was her on 2020 for 2 week follow-up and lab review.     Patient is present here with her daughter. She states that she is continues Heparin 20,000 units subcu injection every 8 hours. She denies any significant bleeding from the Heparin. She states that there is some bruising on the abdomen but that is the only site.     She originally started  heparin on 2020, at 10,000 units every 12 hours due to misreading of labeling.  She changed up to 20,000 units every 12 hours on 2020.  Lab studies on 2020 reported a PTT 32.1 seconds.  We increased her heparin to 20,000 units every 8 hours.  Patient reports mild bruising at the site of heparin injection, but he denies spontaneous bleeding such as gum bleeding, epistaxis, hematuria or hematochezia.      Today on 2020, her PTT is 34.2 seconds at 10:18 AM.  Patient reports she had heparin injection this morning around 7 AM.     Patient was found to having recurrent iron deficiency with ferritin 14.2 and iron saturation 8% on 2020 despite normal hemoglobin 12.5.  She recently received her first dose of Venofer 300 mg on 2020 at the [facility].  She requests to be done on the weekend so her children will be taken care of by family members.      Laboratory study today on 2020 reported hemoglobin 11.9, platelets 206,000 and WBC 8880 including ANC 5680.     Dr. Mittal called and spoke with me today, 2020, discussed further management of anticoagulation.  Because this will be the fourth  for this patient, carries high risk for bleeding, I recommended restarting anticoagulation 12-24 hours after  pending her bleeding situation, with low-dose heparin 10,000 units every 12 hours for 24 to 48 hours, and if no significant bleeding, she could be put back on full dose Lovenox anticoagulation.  We will be happy to see her as consult during her hospitalization.      I discussed with my colleague Dr. Iverson today for the management of anticoagulation.    MEDICATIONS    Current Outpatient Medications:   •  albuterol sulfate  (90 Base) MCG/ACT inhaler, Inhale 2 puffs Every 4 (Four) Hours As Needed for Wheezing., Disp: , Rfl:   •  enoxaparin (Lovenox) 80 MG/0.8ML solution syringe, Inject 0.8 mL under the skin into the appropriate area as directed Every 12  "(Twelve) Hours., Disp: 48 mL, Rfl: 1  •  famotidine (PEPCID) 20 MG tablet, Take 20 mg by mouth 2 (Two) Times a Day., Disp: , Rfl:   •  ibuprofen (ADVIL,MOTRIN) 600 MG tablet, Take 1 tablet by mouth Every 6 (Six) Hours As Needed for Mild Pain  or Moderate Pain ., Disp: 30 tablet, Rfl: 3  •  Needle, Disp, 30G X 1/2\" misc, 0.5 mL 2 (Two) Times a Day., Disp: 50 each, Rfl: 0  •  ondansetron ODT (ZOFRAN-ODT) 4 MG disintegrating tablet, ONE EVERY 8 HOURS AS NEEDED FOR NAUSEA, Disp: , Rfl:   •  Prenatal Vit-Fe Fumarate-FA (PRENATAL, CLASSIC, VITAMIN) 28-0.8 MG tablet tablet, Take 1 tablet by mouth Daily., Disp: , Rfl:     ALLERGIES:   No Known Allergies    SOCIAL HISTORY:       Social History     Socioeconomic History   • Marital status: Single     Spouse name: Not on file   • Number of children: 3   • Years of education: Not on file   • Highest education level: Not on file   Occupational History   • Occupation: Nurse     Employer: [employer]   Tobacco Use   • Smoking status: Never Smoker   • Smokeless tobacco: Never Used   Substance and Sexual Activity   • Alcohol use: Not Currently   • Drug use: No   • Sexual activity: Yes     Partners: Male   · Patient is a rare social drinker.    FAMILY HISTORY:  Family History   Problem Relation Age of Onset   • Diabetes Maternal Uncle    • Breast cancer Paternal Grandmother    • Breast cancer Paternal Aunt    • Breast cancer Paternal Aunt    • Thyroid disease Mother    • Birth defects Neg Hx    · No hematology disorder in the family, including thrombophilia.    REVIEW OF SYSTEMS:  Review of Systems   Constitutional: Negative for appetite change, fatigue and unexpected weight change.        Resolution of pica for ice chips   HENT: Negative for congestion, mouth sores and nosebleeds.         No gum bleeding   Eyes: Negative for photophobia and visual disturbance.   Respiratory: Negative for cough and shortness of breath.    Cardiovascular: Negative for chest pain, " "palpitations and leg swelling.   Gastrointestinal: Negative for abdominal pain, blood in stool, constipation, diarrhea and nausea.   Endocrine: Negative for cold intolerance and polydipsia.   Genitourinary: Negative for dysuria, hematuria and vaginal bleeding.   Musculoskeletal: Negative for back pain and joint swelling.   Skin: Negative for color change and rash.   Allergic/Immunologic: Negative for environmental allergies and immunocompromised state.   Neurological: Negative for dizziness, numbness and headaches.   Hematological: Negative for adenopathy. Bruises/bleeds easily (At the site of Lovenox injection).   Psychiatric/Behavioral: Negative for agitation and confusion.            Vitals:    08/14/20 1014   BP: 133/82   Pulse: 87   Resp: 14   Temp: 97.8 °F (36.6 °C)   TempSrc: Tympanic   SpO2: 96%   Weight: 80.3 kg (177 lb)  Comment: pt stated   Height: 152.4 cm (60\")   PainSc: 0-No pain     Current Status 8/14/2020   ECOG score 0       PHYSICAL EXAM:      GENERAL:  Well-developed, well-nourished female in no acute distress. Patient is wearing a facemask due to coronavirus pandemic.  Patient is 4 weeks post-partum, baby with patient.   SKIN:  Warm, dry without rashes, purpura or petechiae.  HEAD:  Normocephalic.  EYES:  Pupils equal, round.  EOMs intact.  Conjunctivae normal.  EARS:  Hearing intact.  NECK:  Supple; no thyromegaly or masses.  LYMPHATICS:  No cervical, supraclavicular adenopathy.  CHEST:  Lungs clear to auscultation. Good airflow.  Normal respiratory effort.  CARDIAC:  Regular rate and rhythm without murmurs. Normal S1,S2.  ABDOMEN:  Soft, nontender with no organomegaly or masses.  Bowel sounds normal.    EXTREMITIES:  No clubbing, cyanosis or edema.  NEUROLOGICAL:  Cranial Nerves II-XII grossly intact.  No focal neurological deficits.  PSYCHIATRIC:  Normal affect and mood.        RECENT LABS:  Lab Results   Component Value Date    WBC 6.27 08/07/2020    HGB 13.5 08/07/2020    HCT 41.3 " 08/07/2020    MCV 90.0 08/07/2020     08/07/2020     Lab Results   Component Value Date    NEUTROABS 3.35 08/07/2020     Lab Results   Component Value Date    IRON 107 08/07/2020    TIBC 347 08/07/2020    FERRITIN 231.00 (H) 08/07/2020   Iron saturation 31% on 8/7/202.        ASSESSMENT:   1. Pulmonary emboli involving the right main pulmonary artery during her pregnancy at 21 weeks, secondary hypercoagulable status positive for lupus anticoagulant.    · I agree for the patient to continue Lovenox injection 80 mg q.12 h.   · Nevertheless, I recommended to have laboratory studies for hypercoagulable status for antiphospholipid syndrome and also we will check both factor II and factor V Leiden gene mutations together with antithrombin III. I advised against protein C and protein S study since she is pregnant and those levels would be artificially low. I recommended to test those in the future, 2 months after her delivery.   · I discussed with the patient on 3/16/2020 for the length of treatment, since she had a small pulmonary emboli, if her laboratory study was benign then she could be treated for 6 months and then stop.  If she has positive antiphospholipid antibodies then this needs to be reevaluated after 6 months anticoagulation.   · Hypercoagulable workup without protein S or protein C profile on 3/16/2020 showed positive for lupus anticoagulant, but negative for antiphospholipid antibodies including panel #2.  I discussed with the patient that lupus anticoagulant could be related to her pregnancy, this will need to be repeated in the future after childbirth.  If she has persistent lupus anticoagulant, she will likely require life-long anticoagulation due to her hypercoagulable status.  However, these positive results may be due to her current pregnancy, and it may disappear after childbirth, so these studies should be repeated in the future when she is not pregnant.  · Patient was started on heparin on  2020 at 36-week pregnant, and repeat every 12 hours.  Dose was increased at 20,000 units every 8 hours on 2020.   · Patient had a successful  on 2020 delivered a healthy baby girl.  Postpartum she was switched back to Lovenox anticoagulation.  · Today, 2020  I discussed with patient that she will continue Lovenox treatment for now.  Because of her positive lupus anticoagulant, she has a high risk for recurrent thrombosis.  Nevertheless I recommended to repeat laboratory studies in 3 months for reevaluation, including lupus anticoagulant, anti-thrombin, and also will check her protein C and protein S profile which was not done during her pregnancy.   · Because of previous positive lupus anticoagulant, she probably will need 12 months anticoagulation treatment.   · Patient also has a plan to get both IUD and liposuction with other plastic surgery, and I advised the patient to hold the Lovenox 24 hours prior and after surgery for both procedures.     2.  Iron deficiency anemia.    · Unable to tolerate oral iron treatment once a day because of constipation. I looked at her laboratory results.  There was no documented iron study.  I recommended to obtain a baseline on 3/16/2020.  · Laboratory studies on 3/16/2020 showed iron saturation 9%, and the ferritin 12.2.  She clearly has iron deficiency.  I recommended intravenous iron therapy with Venofer 300 mg weekly for 3 doses and patient is agreeable.   · Patient received 3 doses of IV Venofer 300 mg each on 3/25/2020, 2020, and 2020.  Patient reports resolution of pica for ice chips.  · Laboratory studies 2020 showed normalized hemoglobin 12.2, with normalized iron saturation 24% and slightly improved ferritin level 33.1 ng/mL.  Highly suspect she will become iron deficiency again.  · Laboratory studies, 2020, show normal hemoglobin 12.5.  Iron studies reported severe deficiency with ferritin 14.2, free iron 46 TIBC 557 and  iron saturation 8%.  Patient was started on IV Venofer on 7/5/2020, 300 mg for 2 doses.  · Patient is evaluated on 8/14/2020.  She is not taking oral iron at this time and was previously unable to tolerate oral iron treatment once a day during her pregnancy due to nausea or constipation.  Patient reports that she has only tried supplemental iron during her prior pregnancies.  I advised the patient that she should start taking oral iron once a day now to see if she could tolerate without pregnancy.  She needs to maintain her iron status, because she likely will become anemia once resuming regular menses.  The patient verbalized understanding, had all questions answered, and agreed with the plan.        PLAN:     1. Patient continue Lovenox injections every 12 hours, she will continue the injections likely for the next 7 months.   2. Continue follow up with her Ob-Gyn, Dr. Mittal.    3. Patient has plans to get both IUD and liposuction with other plastic surgery, but she will hold the Lovenox 24 hours prior and after for both procedures.   4. Follow-up with me in 3 months as with labs- Antithrombin III, lupus anticoagulant, protein C profile, and protein S profile, together with CBC, Ferritin, Iron profile.      By signing my name here, I Jake Malik, attest that all documentation on 07/07/20 at 13:09 has been prepared under the direction and in the presence of Dr. Ana Jeter MD.    I reviewed the note scribed by Jake Malik and made appropriate corrections.      ANA JETER M.D., Ph.D.        CC:  Noemi Mittal MD

## 2020-08-15 PROBLEM — O88.212: Status: ACTIVE | Noted: 2020-03-07

## 2020-08-25 ENCOUNTER — POSTPARTUM VISIT (OUTPATIENT)
Dept: OBSTETRICS AND GYNECOLOGY | Age: 25
End: 2020-08-25

## 2020-08-25 VITALS
HEIGHT: 60 IN | SYSTOLIC BLOOD PRESSURE: 120 MMHG | WEIGHT: 178.4 LBS | BODY MASS INDEX: 35.02 KG/M2 | DIASTOLIC BLOOD PRESSURE: 80 MMHG

## 2020-08-25 DIAGNOSIS — N87.0 DYSPLASIA OF CERVIX, LOW GRADE (CIN 1): Primary | ICD-10-CM

## 2020-08-25 PROCEDURE — 0503F POSTPARTUM CARE VISIT: CPT | Performed by: OBSTETRICS & GYNECOLOGY

## 2020-08-25 NOTE — PROGRESS NOTES
"Subjective   Sandra Valadez is a 25 y.o. female who presents for a postpartum visit. She is 6 weeks postpartum following a low cervical transverse  section. I have fully reviewed the prenatal and intrapartum course. The delivery was at 38 gestational weeks. Outcome: repeat  section, low transverse incision. Anesthesia: spinal. Postpartum course has been uncomplicated. Baby's course has been normal. Baby is feeding by breast. Bleeding occ spotting. Bowel function is normal. Bladder function is normal. Patient is sexually active. Contraception method is IUD. Postpartum depression screening: negative.    The following portions of the patient's history were reviewed and updated as appropriate: allergies, current medications, past family history, past medical history, past social history, past surgical history and problem list.    Review of Systems  Pertinent items are noted in HPI.    Objective   /80   Ht 152.4 cm (60\")   Wt 80.9 kg (178 lb 6.4 oz)   LMP  (LMP Unknown)   Breastfeeding Yes   BMI 34.84 kg/m²    General:  alert, appears stated age, cooperative and no distress    Breasts:  def, breastfeeding   Lungs: clear to auscultation bilaterally   Heart:  regular rate and rhythm   Abdomen: soft, non-tender; bowel sounds normal; no masses,  no organomegaly and incision healed    Vulva:  normal   Vagina: normal vagina   Cervix:  no lesions   Corpus: normal size, contour, position, consistency, mobility, non-tender   Adnexa:  no mass, fullness, tenderness   Rectal Exam: Not performed.     Assessment/Plan   Normal postpartum exam. Pap smear done at today's visit. Pt had normal pap but JENNA 1 noted during evaluation for irregular bleeding prior to this pregnancy.    1. Contraception: IUD requested; pt notes she used condom when active recently--pt requests Kyleena; her hematologist is comfortable with this. Will schedule in 2 weeks, pt has been advised to hold lovenox for 2 days prior by her " hematogist  2. Hx JENNA 1, plan repeat pap; pt requests HPV  3. Follow up in: 2 weeks or as needed.

## 2020-08-26 ENCOUNTER — TELEPHONE (OUTPATIENT)
Dept: OBSTETRICS AND GYNECOLOGY | Age: 25
End: 2020-08-26

## 2020-08-26 NOTE — TELEPHONE ENCOUNTER
Called pt and discussed her genetic results. Her BRCA testing was negative. Her MyRisk test was negative for any clinically significant mutations. 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 Discussed that this could be re-classified later and recommend she contact [employer] genetic counselors regarding this result. She agrees with this plan and will  her pt copy at her next appt.

## 2020-08-29 LAB
CYTOLOGIST CVX/VAG CYTO: NORMAL
CYTOLOGY CVX/VAG DOC CYTO: NORMAL
CYTOLOGY CVX/VAG DOC THIN PREP: NORMAL
DX ICD CODE: NORMAL
HIV 1 & 2 AB SER-IMP: NORMAL
HPV I/H RISK 4 DNA CVX QL PROBE+SIG AMP: NEGATIVE
Lab: NORMAL
OTHER STN SPEC: NORMAL
STAT OF ADQ CVX/VAG CYTO-IMP: NORMAL

## 2020-09-01 ENCOUNTER — TELEPHONE (OUTPATIENT)
Dept: OBSTETRICS AND GYNECOLOGY | Age: 25
End: 2020-09-01

## 2020-09-01 NOTE — TELEPHONE ENCOUNTER
----- Message from Noemi Mittal MD sent at 8/31/2020  5:42 PM EDT -----  Please call Sandra, her pap and hpv are negative/normal

## 2020-09-03 ENCOUNTER — DOCUMENTATION (OUTPATIENT)
Dept: OBSTETRICS AND GYNECOLOGY | Age: 25
End: 2020-09-03

## 2020-09-03 NOTE — PROGRESS NOTES
Juan from [facility]:  Calling to say pt will need a PA for her Enoraparin (Levonox).  She says she will send request via Fax.

## 2020-09-14 ENCOUNTER — TELEPHONE (OUTPATIENT)
Dept: OBSTETRICS AND GYNECOLOGY | Age: 25
End: 2020-09-14

## 2020-09-14 NOTE — TELEPHONE ENCOUNTER
Emmy from [employer] calling for verification of [place]  medicaid number for the genetic testing.    Phone: [phone] ext [phone]

## 2020-09-15 ENCOUNTER — TELEPHONE (OUTPATIENT)
Dept: OBSTETRICS AND GYNECOLOGY | Age: 25
End: 2020-09-15

## 2020-09-15 NOTE — TELEPHONE ENCOUNTER
I spoke with Sandra: [facility] [employer] [facility] reaching out to her regarding her insurance.  Resulted in July.  I gave her the [employer] phone number and extension to call company.  I also spoke with [facility], and informed her I would call patient to give her the info.

## 2020-09-16 ENCOUNTER — OFFICE VISIT (OUTPATIENT)
Dept: OBSTETRICS AND GYNECOLOGY | Age: 25
End: 2020-09-16

## 2020-09-16 ENCOUNTER — PROCEDURE VISIT (OUTPATIENT)
Dept: OBSTETRICS AND GYNECOLOGY | Age: 25
End: 2020-09-16

## 2020-09-16 VITALS
BODY MASS INDEX: 35.14 KG/M2 | HEIGHT: 60 IN | DIASTOLIC BLOOD PRESSURE: 60 MMHG | WEIGHT: 179 LBS | SYSTOLIC BLOOD PRESSURE: 102 MMHG

## 2020-09-16 DIAGNOSIS — Z01.812 PRE-PROCEDURE LAB EXAM: ICD-10-CM

## 2020-09-16 DIAGNOSIS — Z30.431 IUD CHECK UP: Primary | ICD-10-CM

## 2020-09-16 DIAGNOSIS — Z30.430 ENCOUNTER FOR IUD INSERTION: Primary | ICD-10-CM

## 2020-09-16 PROBLEM — B95.1 GROUP B STREPTOCOCCUS URINARY TRACT INFECTION AFFECTING PREGNANCY, ANTEPARTUM: Status: RESOLVED | Noted: 2020-02-09 | Resolved: 2020-09-16

## 2020-09-16 PROBLEM — N87.0 DYSPLASIA OF CERVIX, LOW GRADE (CIN 1): Status: RESOLVED | Noted: 2020-02-07 | Resolved: 2020-09-16

## 2020-09-16 PROBLEM — O23.40 GROUP B STREPTOCOCCUS URINARY TRACT INFECTION AFFECTING PREGNANCY, ANTEPARTUM: Status: RESOLVED | Noted: 2020-02-09 | Resolved: 2020-09-16

## 2020-09-16 PROBLEM — Z86.711 HISTORY OF PULMONARY EMBOLISM: Status: ACTIVE | Noted: 2020-09-16

## 2020-09-16 PROBLEM — O88.212: Status: RESOLVED | Noted: 2020-03-07 | Resolved: 2020-09-16

## 2020-09-16 PROCEDURE — 76830 TRANSVAGINAL US NON-OB: CPT | Performed by: OBSTETRICS & GYNECOLOGY

## 2020-09-16 PROCEDURE — 58300 INSERT INTRAUTERINE DEVICE: CPT | Performed by: OBSTETRICS & GYNECOLOGY

## 2020-09-16 PROCEDURE — 81025 URINE PREGNANCY TEST: CPT | Performed by: OBSTETRICS & GYNECOLOGY

## 2020-09-16 RX ORDER — CLOTRIMAZOLE 1 %
CREAM (GRAM) TOPICAL
COMMUNITY
Start: 2020-09-01 | End: 2020-11-05

## 2020-09-16 NOTE — PROGRESS NOTES
IUD Insertion Procedure Note    Pre-operative Diagnosis: IUD insertion    Post-operative Diagnosis: same    Indications: contraception    Procedure Details   Urine pregnancy test was done today and result was negative.  The risks (including infection, bleeding, pain, and uterine perforation) and benefits of the procedure were explained to the patient and verbal and written informed consent was obtained.      Cervix cleansed with Betadine. Uterus sounded to 8 cm using sterile technique. IUD inserted without difficulty. String visible and trimmed at 3 cm.    IUD Information:  Kyleena, Lot # [id number], Expiration date June 2022.    Condition:  Stable    Complications:  None  Patient tolerated the procedure well without complications.    Plan:    The patient was advised to call for any fever or for prolonged or severe pain or bleeding. Routine instructions reviewed.  U/s done following and IUD in endometrial cavity  F/u 6 weeks for IUD check

## 2020-09-29 ENCOUNTER — TELEPHONE (OUTPATIENT)
Dept: ONCOLOGY | Facility: CLINIC | Age: 25
End: 2020-09-29

## 2020-09-29 NOTE — TELEPHONE ENCOUNTER
Patient would like a call back from the nurse regarding her medication Lovenox  Also she requested a clearance note from the doctor to be sent to Dr. Raphael Carpio giving clearance for upcoming surgery, she discussed this at last appt  Best call back number [phone]

## 2020-09-29 NOTE — TELEPHONE ENCOUNTER
PT HAD QUESTIONS ABOUT LOVENOX INJ'S. PER LAST DICTATION PT IS STAYING ON THESE FOR 7 MONTHS AND WILL HOLD 24 HRS PRIOR TO AND AFTER UPCOMING [facility]. WENT OVER THAT W/ THE PT AND SHE V/U. SHE ASKED FOR A CLEARANCE LETTER TO BE SENT TO DR. COTA. WILL MESSAGE MA POOL TO HANDLE. PT V/U.

## 2020-10-06 ENCOUNTER — TELEPHONE (OUTPATIENT)
Dept: OBSTETRICS AND GYNECOLOGY | Age: 25
End: 2020-10-06

## 2020-10-06 NOTE — TELEPHONE ENCOUNTER
Call pt, bleeding is common for several months after IUD is placed. No treatment recommended. Advise she call for excessive bleeding ( > 1 pad in 2 hours ) or pain.

## 2020-10-06 NOTE — TELEPHONE ENCOUNTER
Pt c/o bleeding since delivery. Pt had iud placed a few weeks ago and still c/o bleeding. Pt notified that bleeding after IUD insertion is normal, but asking if you can do anything to make it stop.

## 2020-10-30 ENCOUNTER — TELEPHONE (OUTPATIENT)
Dept: OBSTETRICS AND GYNECOLOGY | Age: 25
End: 2020-10-30

## 2020-10-30 NOTE — TELEPHONE ENCOUNTER
Patient of Dr Mittal called she had her baby back in July and has been bleeding ever since.  An IUD was placed in September and she has an IUD check with you on the 5th of November.  She is still bleeding even with the IUD.  She's wanting to know if there's anything that can be prescribed to stop the bleeding. Please advise.

## 2020-11-05 ENCOUNTER — OFFICE VISIT (OUTPATIENT)
Dept: OBSTETRICS AND GYNECOLOGY | Age: 25
End: 2020-11-05

## 2020-11-05 VITALS
DIASTOLIC BLOOD PRESSURE: 62 MMHG | HEIGHT: 60 IN | SYSTOLIC BLOOD PRESSURE: 112 MMHG | WEIGHT: 173 LBS | BODY MASS INDEX: 33.96 KG/M2

## 2020-11-05 DIAGNOSIS — Z30.431 IUD CHECK UP: Primary | ICD-10-CM

## 2020-11-05 DIAGNOSIS — N92.1 MENORRHAGIA WITH IRREGULAR CYCLE: ICD-10-CM

## 2020-11-05 PROCEDURE — 99213 OFFICE O/P EST LOW 20 MIN: CPT | Performed by: NURSE PRACTITIONER

## 2020-11-05 RX ORDER — FERROUS GLUCONATE 324(37.5)
324 TABLET ORAL
COMMUNITY

## 2020-11-05 RX ORDER — ACETAMINOPHEN AND CODEINE PHOSPHATE 120; 12 MG/5ML; MG/5ML
1 SOLUTION ORAL DAILY
Qty: 28 TABLET | Refills: 0 | Status: SHIPPED | OUTPATIENT
Start: 2020-11-05 | End: 2020-12-22

## 2020-11-05 NOTE — PROGRESS NOTES
"Subjective     Chief Complaint   Patient presents with   • Gynecologic Exam     iud check       Sandra Valadez is a 25 y.o.  whose LMP is No LMP recorded. presents today for IUD string check    She states she has been bleeding since she delivered in July  She got the IUD placed in hopes she would stop bleeding but has continued to have bleeding most days  The bleeding is not heavy but she does bleed daily  This is interfering with her daily life including her sex life  She does not desire any more children  She is willing to give the IUD a little more time but is frustrated with the bleeding  Endometrial lining at last appt was 3.4 mm  Interested in ablation if bleeding continues  Pt of Dr. Mittal      No Additional Complaints Reported    The following portions of the patient's history were reviewed and updated as appropriate:vital signs, allergies, current medications, past medical history, past social history, past surgical history and problem list      Review of Systems   A comprehensive review of systems was negative except for:   Genitourinary:positive for abnormal menstrual periods     Objective      /62   Ht 152.4 cm (60\")   Wt 78.5 kg (173 lb)   BMI 33.79 kg/m²     Physical Exam    General:   alert and no distress   Heart: Not performed today   Lungs: Not performed today.   Breast: Not performed today   Neck: na   Abdomen: {Not performed today   CVA: Not performed today   Pelvis: External genitalia: normal general appearance  Urinary system: urethral meatus normal  Vaginal: normal mucosa without prolapse or lesions, normal without tenderness, induration or masses and normal rugae  Cervix: normal appearance and IUD string visualized   Extremities: Not performed today   Neurologic: negative   Psychiatric: Normal judgement, affect and mood       Lab Review   Labs: No data reviewed     Imaging   Ultrasound - Pelvic Vaginal    Assessment/Plan     ASSESSMENT  1. IUD check up    2. Menorrhagia with " irregular cycle        PLAN  1. No orders of the defined types were placed in this encounter.      2. Medications prescribed this encounter:        New Medications Ordered This Visit   Medications   • norethindrone (MICRONOR) 0.35 MG tablet     Sig: Take 1 tablet by mouth Daily.     Dispense:  28 tablet     Refill:  0       3. Pt not a candidate for combo OCPs, will try one month of POP to see if able to stop the bleeding or regulate cycle    Follow up: 6 week(s) with Dr. Mittal for continued bleeding and discuss ablation. If bleeding stops with POP's may cancel appt if desires and continue with IUD.    Deb Hartley, APRN  11/5/2020

## 2020-11-10 ENCOUNTER — TELEPHONE (OUTPATIENT)
Dept: ONCOLOGY | Facility: CLINIC | Age: 25
End: 2020-11-10

## 2020-11-11 ENCOUNTER — LAB (OUTPATIENT)
Dept: LAB | Facility: HOSPITAL | Age: 25
End: 2020-11-11

## 2020-11-11 DIAGNOSIS — O88.212 PULMONARY EMBOLISM COMPLICATING PREGNANCY IN SECOND TRIMESTER: ICD-10-CM

## 2020-11-11 DIAGNOSIS — D68.62 LUPUS ANTICOAGULANT WITH HYPERCOAGULABLE STATE (HCC): ICD-10-CM

## 2020-11-11 DIAGNOSIS — O99.019 IRON DEFICIENCY ANEMIA DURING PREGNANCY: ICD-10-CM

## 2020-11-11 DIAGNOSIS — I26.99 ACUTE PULMONARY EMBOLISM WITHOUT ACUTE COR PULMONALE, UNSPECIFIED PULMONARY EMBOLISM TYPE (HCC): ICD-10-CM

## 2020-11-11 DIAGNOSIS — D50.9 IRON DEFICIENCY ANEMIA DURING PREGNANCY: ICD-10-CM

## 2020-11-11 LAB
BASOPHILS # BLD AUTO: 0.02 10*3/MM3 (ref 0–0.2)
BASOPHILS NFR BLD AUTO: 0.3 % (ref 0–1.5)
DEPRECATED RDW RBC AUTO: 41.6 FL (ref 37–54)
EOSINOPHIL # BLD AUTO: 0.04 10*3/MM3 (ref 0–0.4)
EOSINOPHIL NFR BLD AUTO: 0.7 % (ref 0.3–6.2)
ERYTHROCYTE [DISTWIDTH] IN BLOOD BY AUTOMATED COUNT: 12.9 % (ref 12.3–15.4)
FERRITIN SERPL-MCNC: 151.5 NG/ML (ref 11–207)
HCT VFR BLD AUTO: 40.9 % (ref 34–46.6)
HGB BLD-MCNC: 13.4 G/DL (ref 12–15.9)
HGB RETIC QN AUTO: 35.3 PG (ref 29.8–36.1)
IMM GRANULOCYTES # BLD AUTO: 0.01 10*3/MM3 (ref 0–0.05)
IMM GRANULOCYTES NFR BLD AUTO: 0.2 % (ref 0–0.5)
IMM RETICS NFR: 10.4 % (ref 3–15.8)
IRON 24H UR-MRATE: 87 MCG/DL (ref 37–145)
IRON SATN MFR SERPL: 25 % (ref 14–48)
LYMPHOCYTES # BLD AUTO: 1.48 10*3/MM3 (ref 0.7–3.1)
LYMPHOCYTES NFR BLD AUTO: 24.2 % (ref 19.6–45.3)
MCH RBC QN AUTO: 28.9 PG (ref 26.6–33)
MCHC RBC AUTO-ENTMCNC: 32.8 G/DL (ref 31.5–35.7)
MCV RBC AUTO: 88.3 FL (ref 79–97)
MONOCYTES # BLD AUTO: 0.49 10*3/MM3 (ref 0.1–0.9)
MONOCYTES NFR BLD AUTO: 8 % (ref 5–12)
NEUTROPHILS NFR BLD AUTO: 4.07 10*3/MM3 (ref 1.7–7)
NEUTROPHILS NFR BLD AUTO: 66.6 % (ref 42.7–76)
NRBC BLD AUTO-RTO: 0 /100 WBC (ref 0–0.2)
PLATELET # BLD AUTO: 320 10*3/MM3 (ref 140–450)
PMV BLD AUTO: 9 FL (ref 6–12)
RBC # BLD AUTO: 4.63 10*6/MM3 (ref 3.77–5.28)
RETICS # AUTO: 0.07 10*6/MM3 (ref 0.02–0.13)
RETICS/RBC NFR AUTO: 1.55 % (ref 0.7–1.9)
TIBC SERPL-MCNC: 347 MCG/DL (ref 249–505)
TRANSFERRIN SERPL-MCNC: 248 MG/DL (ref 200–360)
WBC # BLD AUTO: 6.11 10*3/MM3 (ref 3.4–10.8)

## 2020-11-11 PROCEDURE — 85303 CLOT INHIBIT PROT C ACTIVITY: CPT | Performed by: INTERNAL MEDICINE

## 2020-11-11 PROCEDURE — 85046 RETICYTE/HGB CONCENTRATE: CPT

## 2020-11-11 PROCEDURE — 85306 CLOT INHIBIT PROT S FREE: CPT | Performed by: INTERNAL MEDICINE

## 2020-11-11 PROCEDURE — 85025 COMPLETE CBC W/AUTO DIFF WBC: CPT

## 2020-11-11 PROCEDURE — 82728 ASSAY OF FERRITIN: CPT

## 2020-11-11 PROCEDURE — 85300 ANTITHROMBIN III ACTIVITY: CPT | Performed by: INTERNAL MEDICINE

## 2020-11-11 PROCEDURE — 83540 ASSAY OF IRON: CPT

## 2020-11-11 PROCEDURE — 84466 ASSAY OF TRANSFERRIN: CPT

## 2020-11-11 PROCEDURE — 36415 COLL VENOUS BLD VENIPUNCTURE: CPT

## 2020-11-13 LAB
APTT SCREEN TO CONFIRM RATIO: 1.05 RATIO (ref 0–1.4)
AT III PPP CHRO-ACNC: 114 % (ref 90–134)
CONFIRM APTT/NORMAL: 40.3 SEC (ref 0–55)
LA 2 SCREEN W REFLEX-IMP: NORMAL
LA 2 SCREEN W REFLEX-IMP: NORMAL
PROT C ACT/NOR PPP: 123 % (ref 86–163)
PROT C AG ACT/NOR PPP IA: 106 % (ref 60–150)
PROT S ACT/NOR PPP: 104 % (ref 70–127)
PROT S FREE PPP-ACNC: 106 % (ref 49–138)
SCREEN APTT: 44.2 SEC (ref 0–51.9)
SCREEN APTT: 45.8 SEC (ref 0–51.9)
SCREEN DRVVT: 45.3 SEC (ref 0–47)
SCREEN DRVVT: 46.2 SEC (ref 0–47)
THROMBIN TIME: 16.5 SEC (ref 0–23)

## 2020-11-16 ENCOUNTER — APPOINTMENT (OUTPATIENT)
Dept: LAB | Facility: HOSPITAL | Age: 25
End: 2020-11-16

## 2020-11-17 ENCOUNTER — TELEPHONE (OUTPATIENT)
Dept: ONCOLOGY | Facility: CLINIC | Age: 25
End: 2020-11-17

## 2020-11-17 NOTE — TELEPHONE ENCOUNTER
She's having surg today was supposed to stop taking her Lovenox 24 hours prior but she stopped it on Thur.  Is it still ok for her to have the surg.

## 2020-11-17 NOTE — TELEPHONE ENCOUNTER
PT CALLING ASKING IF SHE IS OK FOR SURGERY. CALLED PT BACK AND L/M STATING THAT SHE NEEDS TO S/W PERSON DOING SURGERY.

## 2020-11-17 NOTE — TELEPHONE ENCOUNTER
LUCINDA CALLING FOR SURGERY CLEARANCE. CALLED HER BACK AND SHE SAID THAT THEY HAVE ALREADY REC'D IT.

## 2020-11-18 ENCOUNTER — TELEPHONE (OUTPATIENT)
Dept: ONCOLOGY | Facility: CLINIC | Age: 25
End: 2020-11-18

## 2020-11-18 NOTE — TELEPHONE ENCOUNTER
Caller: HERNANDEZ GRAMAJO    Relationship to patient: SELF    Best call back number:[phone]    Chief complaint: NEED TO CANCEL 11/19 APPT @ 12:20 CURRENTLY IN HOSPITAL. WILL CALL TO RESCHED.      Type of visit: FU1

## 2020-11-19 ENCOUNTER — APPOINTMENT (OUTPATIENT)
Dept: LAB | Facility: HOSPITAL | Age: 25
End: 2020-11-19

## 2020-11-23 ENCOUNTER — TELEPHONE (OUTPATIENT)
Dept: ONCOLOGY | Facility: CLINIC | Age: 25
End: 2020-11-23

## 2020-12-02 ENCOUNTER — TELEMEDICINE (OUTPATIENT)
Dept: ONCOLOGY | Facility: CLINIC | Age: 25
End: 2020-12-02

## 2020-12-02 ENCOUNTER — APPOINTMENT (OUTPATIENT)
Dept: OTHER | Facility: HOSPITAL | Age: 25
End: 2020-12-02

## 2020-12-02 ENCOUNTER — LAB (OUTPATIENT)
Dept: LAB | Facility: HOSPITAL | Age: 25
End: 2020-12-02

## 2020-12-02 VITALS
WEIGHT: 176.6 LBS | TEMPERATURE: 97 F | OXYGEN SATURATION: 98 % | HEIGHT: 60 IN | HEART RATE: 77 BPM | BODY MASS INDEX: 34.67 KG/M2 | SYSTOLIC BLOOD PRESSURE: 108 MMHG | DIASTOLIC BLOOD PRESSURE: 72 MMHG | RESPIRATION RATE: 19 BRPM

## 2020-12-02 DIAGNOSIS — D50.9 IRON DEFICIENCY ANEMIA DURING PREGNANCY: Primary | ICD-10-CM

## 2020-12-02 DIAGNOSIS — D50.9 IRON DEFICIENCY ANEMIA, UNSPECIFIED IRON DEFICIENCY ANEMIA TYPE: Primary | ICD-10-CM

## 2020-12-02 DIAGNOSIS — O99.019 IRON DEFICIENCY ANEMIA DURING PREGNANCY: Primary | ICD-10-CM

## 2020-12-02 DIAGNOSIS — D68.62 LUPUS ANTICOAGULANT WITH HYPERCOAGULABLE STATE (HCC): ICD-10-CM

## 2020-12-02 DIAGNOSIS — Z86.711 HISTORY OF PULMONARY EMBOLISM: ICD-10-CM

## 2020-12-02 DIAGNOSIS — T45.4X5D ADVERSE EFFECT OF IRON AND ITS COMPOUNDS, SUBSEQUENT ENCOUNTER: ICD-10-CM

## 2020-12-02 DIAGNOSIS — I26.99 ACUTE PULMONARY EMBOLISM WITHOUT ACUTE COR PULMONALE, UNSPECIFIED PULMONARY EMBOLISM TYPE (HCC): ICD-10-CM

## 2020-12-02 LAB
BASOPHILS # BLD AUTO: 0.04 10*3/MM3 (ref 0–0.2)
BASOPHILS NFR BLD AUTO: 0.7 % (ref 0–1.5)
DEPRECATED RDW RBC AUTO: 53.4 FL (ref 37–54)
EOSINOPHIL # BLD AUTO: 0.12 10*3/MM3 (ref 0–0.4)
EOSINOPHIL NFR BLD AUTO: 2.1 % (ref 0.3–6.2)
ERYTHROCYTE [DISTWIDTH] IN BLOOD BY AUTOMATED COUNT: 15.4 % (ref 12.3–15.4)
FERRITIN SERPL-MCNC: 66.5 NG/ML (ref 11–207)
HCT VFR BLD AUTO: 34.2 % (ref 34–46.6)
HGB BLD-MCNC: 10.6 G/DL (ref 12–15.9)
IMM GRANULOCYTES # BLD AUTO: 0.01 10*3/MM3 (ref 0–0.05)
IMM GRANULOCYTES NFR BLD AUTO: 0.2 % (ref 0–0.5)
IRON 24H UR-MRATE: 57 MCG/DL (ref 37–145)
IRON SATN MFR SERPL: 15 % (ref 14–48)
LYMPHOCYTES # BLD AUTO: 1.4 10*3/MM3 (ref 0.7–3.1)
LYMPHOCYTES NFR BLD AUTO: 25 % (ref 19.6–45.3)
MCH RBC QN AUTO: 29.9 PG (ref 26.6–33)
MCHC RBC AUTO-ENTMCNC: 31 G/DL (ref 31.5–35.7)
MCV RBC AUTO: 96.6 FL (ref 79–97)
MONOCYTES # BLD AUTO: 0.49 10*3/MM3 (ref 0.1–0.9)
MONOCYTES NFR BLD AUTO: 8.8 % (ref 5–12)
NEUTROPHILS NFR BLD AUTO: 3.54 10*3/MM3 (ref 1.7–7)
NEUTROPHILS NFR BLD AUTO: 63.2 % (ref 42.7–76)
NRBC BLD AUTO-RTO: 0 /100 WBC (ref 0–0.2)
PLATELET # BLD AUTO: 445 10*3/MM3 (ref 140–450)
PMV BLD AUTO: 8.3 FL (ref 6–12)
RBC # BLD AUTO: 3.54 10*6/MM3 (ref 3.77–5.28)
TIBC SERPL-MCNC: 391 MCG/DL (ref 249–505)
TRANSFERRIN SERPL-MCNC: 279 MG/DL (ref 200–360)
WBC # BLD AUTO: 5.6 10*3/MM3 (ref 3.4–10.8)

## 2020-12-02 PROCEDURE — 36415 COLL VENOUS BLD VENIPUNCTURE: CPT

## 2020-12-02 PROCEDURE — 85025 COMPLETE CBC W/AUTO DIFF WBC: CPT

## 2020-12-02 PROCEDURE — 83540 ASSAY OF IRON: CPT

## 2020-12-02 PROCEDURE — 84466 ASSAY OF TRANSFERRIN: CPT

## 2020-12-02 PROCEDURE — 82728 ASSAY OF FERRITIN: CPT

## 2020-12-02 PROCEDURE — 99214 OFFICE O/P EST MOD 30 MIN: CPT | Performed by: INTERNAL MEDICINE

## 2020-12-02 NOTE — PROGRESS NOTES
.     REASONS FOR FOLLOW-UP:       1. Pulmonary emboli involving the right main pulmonary artery during her pregnancy at 21 weeks in 2019.  Patient was started on Lovenox injection 80 mg q.12 h.   · Hypercoagulable workup without protein S or protein C profile on 3/16/2020 showed positive lupus anticoagulant and marginally low Antithrombin III activity 82%.   · Patient was started on self injection of unfractionated heparin 20,000 units every 12 hours starting at 36-week pregnancy.   · Patient had a successful  for a healthy baby girl on 2020.    · Converted back to Lovenox injection postpartum.  2.  Iron deficiency anemia.   · Unable to tolerate oral iron treatment once a day because of constipation during pregnancy.    · Patient received 3 doses of IV Venofer 300 mg each on 3/25/2020, 2020, and 2020.  · Recurrent iron deficiency anemia again on 2020.  Patient was given Venofer 300 mg on 2020 and 2020.    HISTORY OF PRESENT ILLNESS:  The patient is a 25 y.o. female with the above-mentioned history who presents today for 4-month follow-up and lab review.  This was originally scheduled as a telemedicine, however she presented for laboratory study in the clinic and evaluation, requested by her plastic surgeon because of recent postsurgical bleeding.    This patient recently had liposuction in middle 2020.  Patient reports she has been off Lovenox prior to the surgery.  Nevertheless she had a significant bleeding afterwards, required a PRBC transfusion.  She originally had a 2 drainage catheter in place, now only has 1 remaining, has some dark-colored blood.  She dumped out about 60 mL this morning.  She reports no fever no sweating no chills.    Laboratory studies this morning on 2020 reported anemia with hemoglobin 10.6, with normal WBC 5600 and platelets 445,000.    On 2020, she had a normalized hemoglobin 13.4, WBC 6100, and platelets 320,000.  She  had a normal iron study with ferritin 151 ng/mL and iron saturation 25% with free iron 87 TIBC 347.  Repeating hypercoagulable laboratory studies reported a normal Antithrombin activity 114%, normal protein C activity 123%, protein C antigen 106%, free protein S antigen 106% and protein S activity 104%, and negative for lupus anticoagulant.     Patient also had IUD implantation in 2020.            Past Medical History:   Diagnosis Date   • Abnormal Pap smear of cervix    • Anemia     during pregnancy, received iron infusions    • Chlamydia     at age 14 ,   • H/O Depressive disorder    • Herpes     last outbreak    • History of anxiety    • History of urinary tract infection    • Pulmonary embolism (CMS/HCC) 2020    during pregnancy     Past Surgical History:   Procedure Laterality Date   •  SECTION  , , 2015    x3    •  SECTION N/A 2020    Procedure:  SECTION REPEAT;  Surgeon: Noemi Mittal MD;  Location: [facility] LABOR DELIVERY;  Service: Obstetrics/Gynecology;  Laterality: N/A;     ONCOLOGIC/HEMATOLOGIC HISTORY: (History from previous dates can be found in the separate document.)   The patient is a 25 y.o. female who presented for initial evaluation on 3/16/2020, referred by her GYN specialist, Dr. Mittal, because of newly diagnosed pulmonary emboli.     Patient reports she had episodes of flu back in 2020. She had lingering issues with some dyspnea. Patient also reported she had tachycardia associated with her shortness of breath. She was seen by Cardiology service and thought related to her pregnancy.  She did have worsening problem and eventually had CT angiogram study on 2020 when she was evaluated in the emergency room and admitted to the hospital for worsening dyspnea. This study reported several small pulmonary emboli in the adjacent branches of the right main pulmonary artery. There were no lung lesions. The upper abdomen showed  unremarkable liver, spleen and adrenal glands. Patient was started on Lovenox every 12 hours anticoagulation.     Patient reports bruising at the site of Lovenox injection. She currently uses 80 mg q.12 h. She reports no spontaneous epistaxis or gum bleeding or any other bleeding problem.     As of 3/16/2020, patient was 22 and 1/2 weeks pregnant.  She reports no previous history of thrombophilia.  She had previous 3 C-sections with successful pregnancies. She reports previous use of birth control pills for about 3 years without problem.  She also has no family history for thrombophilia.     Patient recently was started on oral iron treatment during her hospitalization in early 3/2020.  This patient had normal laboratory study, CBC on 11/22/2019 with hemoglobin 12.9, MCV 87 and platelets 394,000, WBC 6600.  Her labs on 02/03/2020 reported hemoglobin 12.1, MCV 88.8, platelets 294,000 and WBC 8550 including ANC 5630, lymphocytes 2170.  She had normal TSH 1.66 that day. On 03/07/2020, she had hemoglobin 11.7.  On 03/08/2020, her hemoglobin was 10.7 with MCV 88.7, MCHC 34.2.  Normal platelets and WBC.     Patient reports she was not able to tolerate oral iron once a day. She has not been taking iron for the past few days. She reports constipation secondary to oral iron treatment. Patient also reports previously she was given oral iron treatment during her 1st pregnancy and she did not tolerate it.     Laboratory study on 3/16/2020 reported hemoglobin 11.3, MCV 87.7, MCHC 34.5. Platelets 248,000 and WBC 9300 including ANC 5920, lymphocytes 2360.  Iron saturation 9% on 3/16/2020, and clearly patient has iron deficiency.      Hypercoagulable workup, without protein S or protein C profile, on 3/16/2020 showed positive for lupus anticoagulant, however negative for anticardiolipin antibodies, anti-beta-2 glycoprotein 1 antibodies, and a negative for antiphospholipid antibody panel #2.  She had a marginal Antithrombin activity  82%, negative for factor II prothrombin mutation and negative for factor V Leiden mutation.     Patient received 3 doses of IV Venofer 300 mg each on 3/25/2020, 2020, and 2020 with good tolerance.  She reports that her energy level moderately improved with the IV iron therapy, and she no longer has pica for ice chips.    The patient presented to the [facility] Obst[facility] ER on 3/27/2020 complaining of progressive shortness of breath over the prior week.  She was placed on a pulse/oximeter and was noted to have a pulse of 70-80 with O2 saturation of % during her stay in the ED.  Her chest exam was normal, and her laboratory studies showed hemoglobin 10.7.  She was discharged home under stable conditions and was advised to follow up with Dr. Mittal, her Ob/Gyn, for further evaluation.    On 3/20/2020, Dr. Hinds, at [facility], recommended changing the patient to heparin at approximately 35 weeks pregnancy, rather than continuing lovenox.     Laboratory studies on 2020 showed normalized hemoglobin 12.2, and improved iron saturation 24%, ferritin 33.1, free iron 126 and a TIBC 528.  Maintains normal WBC 9380, and platelets 211,000.     On 2020 she presented today for 4-week re-evaluation with laboratory review.    Patient is currently 35 and a half weeks pregnant.  She continues to use Lovenox injections 80 mg q.12 h.  Patient reports bruising at the site of Lovenox injection, but she reports no spontaneous epistaxis or gum bleeding or any other bleeding problem.  She continues to follow up with Dr. Mittal, and is scheduled to have her  section done on 2020.  Patient has had 3 prior  sections done.      She is not taking oral iron at this time and was previously unable to tolerate oral iron treatment once a day due to nausea or constipation.  She reports that she has only tried supplemental iron while she was previously pregnant.    Patient currently has rapid pulse of 108 and her O2  saturation is 98% with /73.     Laboratory studies from, 2020, showed normal hemoglobin 12.5 MCV 93.2, platelets 227,000 and WBC 10,200 including ANC 7280 lymphocytes 2000.  Iron studies pending.     Patient was her on 2020 for 2 week follow-up and lab review.     Patient is present here with her daughter. She states that she is continues Heparin 20,000 units subcu injection every 8 hours. She denies any significant bleeding from the Heparin. She states that there is some bruising on the abdomen but that is the only site.     She originally started heparin on 2020, at 10,000 units every 12 hours due to misreading of labeling.  She changed up to 20,000 units every 12 hours on 2020.  Lab studies on 2020 reported a PTT 32.1 seconds.  We increased her heparin to 20,000 units every 8 hours.  Patient reports mild bruising at the site of heparin injection, but he denies spontaneous bleeding such as gum bleeding, epistaxis, hematuria or hematochezia.      Today on 2020, her PTT is 34.2 seconds at 10:18 AM.  Patient reports she had heparin injection this morning around 7 AM.     Patient was found to having recurrent iron deficiency with ferritin 14.2 and iron saturation 8% on 2020 despite normal hemoglobin 12.5.  She recently received her first dose of Venofer 300 mg on 2020 at the [facility].  She requests to be done on the weekend so her children will be taken care of by family members.      Laboratory study today on 2020 reported hemoglobin 11.9, platelets 206,000 and WBC 8880 including ANC 5680.     Dr. Mittal called and spoke with me on 2020, discussed further management of anticoagulation.  Because this will be the fourth  for this patient, carries high risk for bleeding, I recommended restarting anticoagulation 12-24 hours after  pending her bleeding situation, with low-dose heparin 10,000 units every 12 hours for 24 to 48 hours, and if  no significant bleeding, she could be put back on full dose Lovenox anticoagulation.  We will be happy to see her as consult during her hospitalization.      Patient had a successful  for a healthy baby girl on 2020.      Laboratory studies, 2020, show normal CBC with Hb 13.5, platelets 366,000 and a WBC 6270.  She also has normalized iron study with ferritin 231 and iron saturation 31% with free iron 107 TIBC 347.       MEDICATIONS    Current Outpatient Medications:   •  ferrous gluconate 324 (37.5 Fe) MG tablet tablet, Take 324 mg by mouth Daily With Breakfast., Disp: , Rfl:   •  ondansetron ODT (ZOFRAN-ODT) 4 MG disintegrating tablet, ONE EVERY 8 HOURS AS NEEDED FOR NAUSEA, Disp: , Rfl:   •  Prenatal Vit-Fe Fumarate-FA (PRENATAL, CLASSIC, VITAMIN) 28-0.8 MG tablet tablet, Take 1 tablet by mouth Daily., Disp: , Rfl:   •  enoxaparin (LOVENOX) 80 MG/0.8ML solution syringe, Inject 0.8 mL under the skin into the appropriate area as directed Every 12 (Twelve) Hours., Disp: 48 syringe, Rfl: 2  •  norethindrone (MICRONOR) 0.35 MG tablet, Take 1 tablet by mouth Daily., Disp: 28 tablet, Rfl: 0    ALLERGIES:   No Known Allergies    SOCIAL HISTORY:       Social History     Socioeconomic History   • Marital status: Single     Spouse name: Not on file   • Number of children: 3   • Years of education: Not on file   • Highest education level: Not on file   Occupational History   • Occupation: Nurse     Employer: [employer]   Tobacco Use   • Smoking status: Never Smoker   • Smokeless tobacco: Never Used   Substance and Sexual Activity   • Alcohol use: Not Currently   • Drug use: No   • Sexual activity: Yes     Partners: Male     Birth control/protection: I.U.D.   · Patient is a rare social drinker.    FAMILY HISTORY:  Family History   Problem Relation Age of Onset   • Diabetes Maternal Uncle    • Breast cancer Paternal Grandmother    • Breast cancer Paternal Aunt    • Breast cancer Paternal  "Aunt    • Thyroid disease Mother    • Birth defects Neg Hx    · No hematology disorder in the family, including thrombophilia.    REVIEW OF SYSTEMS:  Review of Systems   Constitutional: Positive for fatigue. Negative for activity change, appetite change, diaphoresis, fever and unexpected weight change.   HENT: Negative for congestion, mouth sores and nosebleeds.    Eyes: Negative for photophobia and visual disturbance.   Respiratory: Negative for cough and shortness of breath.    Cardiovascular: Negative for chest pain, palpitations and leg swelling.   Gastrointestinal: Negative for abdominal pain, blood in stool, constipation, diarrhea and nausea.   Endocrine: Negative for cold intolerance and polydipsia.   Genitourinary: Negative for dysuria, hematuria and vaginal bleeding.   Musculoskeletal: Negative for back pain and joint swelling.   Skin: Positive for wound (Drainage catheter in the left abdomen post the liposuction). Negative for color change and pallor.   Allergic/Immunologic: Negative for environmental allergies and immunocompromised state.   Neurological: Negative for dizziness, syncope, numbness and headaches.   Hematological: Negative for adenopathy. Does not bruise/bleed easily.   Psychiatric/Behavioral: Negative for agitation and confusion.            Vitals:    12/02/20 0955   BP: 108/72   Pulse: 77   Resp: 19   Temp: 97 °F (36.1 °C)   TempSrc: Temporal   SpO2: 98%   Weight: 80.1 kg (176 lb 9.6 oz)   Height: 152.4 cm (60\")   PainSc: 0-No pain     Current Status 12/2/2020   ECOG score 0       PHYSICAL EXAM:      GENERAL:  Well-developed, well-nourished female in no acute distress. Patient is wearing a facemask due to coronavirus pandemic.   SKIN:  Warm, dry without rashes, purpura or petechiae.  HEAD:  Normocephalic.  EYES:  Pupils equal, round.  EOMs intact.  Conjunctivae normal.  EARS:  Hearing intact.  NECK:  Supple; no thyromegaly or masses.  LYMPHATICS:  No cervical, supraclavicular " adenopathy.  CHEST:  Lungs clear to auscultation. Good airflow.  Normal respiratory effort.  CARDIAC:  Regular rate and rhythm without murmurs. Normal S1,S2.  ABDOMEN:  Soft, patient has supportive abdominal wrap in place.  A left abdomen drainage catheter in place, with dark-colored blood/liquid in the collection bulb.  Bowel sounds normal.    EXTREMITIES:  No clubbing, cyanosis or edema.  NEUROLOGICAL:  Cranial Nerves II-XII grossly intact.    PSYCHIATRIC:  Normal affect and mood.        RECENT LABS:  Lab Results   Component Value Date    WBC 5.60 12/02/2020    HGB 10.6 (L) 12/02/2020    HCT 34.2 12/02/2020    MCV 96.6 12/02/2020     12/02/2020     Lab Results   Component Value Date    NEUTROABS 3.54 12/02/2020     Lab Results   Component Value Date    IRON 87 11/11/2020    TIBC 347 11/11/2020    FERRITIN 151.50 11/11/2020   Iron saturation 31% on 8/7/202.        ASSESSMENT:   1. Pulmonary emboli involving the right main pulmonary artery during her pregnancy at 21 weeks, secondary hypercoagulable status positive for lupus anticoagulant.    · I agree for the patient to continue Lovenox injection 80 mg q.12 h.   · Nevertheless, I recommended to have laboratory studies for hypercoagulable status for antiphospholipid syndrome and also we will check both factor II and factor V Leiden gene mutations together with antithrombin III. I advised against protein C and protein S study since she is pregnant and those levels would be artificially low. I recommended to test those in the future, 2 months after her delivery.   · I discussed with the patient on 3/16/2020 for the length of treatment, since she had a small pulmonary emboli, if her laboratory study was benign then she could be treated for 6 months and then stop.  If she has positive antiphospholipid antibodies then this needs to be reevaluated after 6 months anticoagulation.   · Hypercoagulable workup without protein S or protein C profile on 3/16/2020 showed  positive for lupus anticoagulant, but negative for antiphospholipid antibodies including panel #2.  I discussed with the patient that lupus anticoagulant could be related to her pregnancy, this will need to be repeated in the future after childbirth.  If she has persistent lupus anticoagulant, she will likely require life-long anticoagulation due to her hypercoagulable status.  However, these positive results may be due to her current pregnancy, and it may disappear after childbirth, so these studies should be repeated in the future when she is not pregnant.  · Patient was started on heparin on 2020 at 36-week pregnant, and repeat every 12 hours.  Dose was increased at 20,000 units every 8 hours on 2020.   · Patient had a successful  on 2020 delivered a healthy baby girl.  Postpartum she was switched back to Lovenox anticoagulation.  · Today, 2020  I discussed with patient that she will continue Lovenox treatment for now.  Because of her positive lupus anticoagulant, she has a high risk for recurrent thrombosis.  Nevertheless I recommended to repeat laboratory studies in 3 months for reevaluation, including lupus anticoagulant, anti-thrombin, and also will check her protein C and protein S profile which was not done during her pregnancy.   · Because of previous positive lupus anticoagulant, she probably will need 12 months anticoagulation treatment.   · Patient had IUD in 2020.    · Laboratory study on 2020 reported negative for lupus anticoagulant, normal protein C and protein S profile, and normal Antithrombin activity.  · Patient has been off Lovenox prior to her liposuction 2020.  Nevertheless she had significant bleeding post the surgery, and required PRBC transfusion.  · As of 2020, she is still off Lovenox anticoagulation.    2.  Iron deficiency anemia.    · Unable to tolerate oral iron treatment once a day because of constipation. I looked at her  laboratory results.  There was no documented iron study.  I recommended to obtain a baseline on 3/16/2020.  · Laboratory studies on 3/16/2020 showed iron saturation 9%, and the ferritin 12.2.  She clearly has iron deficiency.  I recommended intravenous iron therapy with Venofer 300 mg weekly for 3 doses and patient is agreeable.   · Patient received 3 doses of IV Venofer 300 mg each on 3/25/2020, 4/1/2020, and 4/8/2020.  Patient reports resolution of pica for ice chips.  · Laboratory studies 5/26/2020 showed normalized hemoglobin 12.2, with normalized iron saturation 24% and slightly improved ferritin level 33.1 ng/mL.  Highly suspect she will become iron deficiency again.  · Laboratory studies, 6/23/2020, show normal hemoglobin 12.5.  Iron studies reported severe deficiency with ferritin 14.2, free iron 46 TIBC 557 and iron saturation 8%.  Patient was started on IV Venofer on 7/5/2020, 300 mg for 2 doses.  · Laboratory study on 8/7/2020 reported excellent ferritin 231 and iron saturation 31% with free iron 107 TIBC 347.  Normalized hemoglobin 13.5.  · Patient is evaluated on 8/14/2020.  She is not taking oral iron at this time and was previously unable to tolerate oral iron treatment once a day during her pregnancy due to nausea or constipation.  Patient reports that she has only tried supplemental iron during her prior pregnancies.  I advised the patient that she should start taking oral iron once a day now to see if she could tolerate without pregnancy.  She needs to maintain her iron status, because she likely will become anemia once resuming regular menses.  The patient verbalized understanding, had all questions answered, and agreed with the plan.    · On 11/11/2020, she had a normalized hemoglobin 13.4, WBC 6100, and platelets 320,000.  She had a normal iron study with ferritin 151 ng/mL and iron saturation 25% with free iron 87 TIBC 347.   · Patient reports 12/2/2020 that is after her liposuction in November  2020, she actually was given PRBC transfusion due to significant bleeding despite was of Lovenox anticoagulation prior to the surgery.  Patient has recurrent anemia with Hb 10.6.  I recommended to retest her iron studies.        PLAN:     1. Restart Lovenox at the reduced dose, 80 mg every 24 hours.    2. Add iron studies today.  3. Continue follow-up with plastic surgery Dr. Mast.  Her drainage catheter will be removed in next week.   4. If no evidence of increased hemorrhage, she can increase Lovenox injections every 12 hours, she will continue Lovenox into March 2021.   5. Arrange patient follow-up with me in middle March 2020.      ANA JETER M.D., Ph.D.    12/2/2020.    Addendum:   Lab Results   Component Value Date    IRON 57 12/02/2020    TIBC 391 12/02/2020    FERRITIN 66.50 12/02/2020   Iron saturation 15%    Laboratory study reported recurrent iron deficiency, and associated with anemia Hb 10.6.  She is a candidate of intravenous iron therapy.    I called and spoke with the patient on 12/4/2020 and she is willing to receive IV iron therapy, due to poor tolerance to oral iron treatment previously.    We will schedule patient for Injectafer weekly for 2 doses.    ANA JETER M.D., Ph.D.    12/4/2020      CC:   MD Mannie Grier M.D

## 2020-12-06 PROBLEM — D50.9 IRON DEFICIENCY ANEMIA: Status: ACTIVE | Noted: 2020-12-06

## 2020-12-06 PROBLEM — T45.4X5D ADVERSE EFFECT OF IRON AND ITS COMPOUNDS, SUBSEQUENT ENCOUNTER: Status: ACTIVE | Noted: 2020-03-16

## 2020-12-07 ENCOUNTER — TELEPHONE (OUTPATIENT)
Dept: ONCOLOGY | Facility: CLINIC | Age: 25
End: 2020-12-07

## 2020-12-07 NOTE — TELEPHONE ENCOUNTER
----- Message from Jessica Ho MD PhD sent at 12/4/2020  6:22 PM EST -----  Please arrange patient to have IV Injectafer weekly for 2 doses.    Thank you very much!    claus

## 2020-12-09 ENCOUNTER — TELEPHONE (OUTPATIENT)
Dept: ONCOLOGY | Facility: CLINIC | Age: 25
End: 2020-12-09

## 2020-12-09 NOTE — TELEPHONE ENCOUNTER
Patient needs to r/s 12/11 and 12/18 iron infusion appointments.  She will need a Tuesday or Thursday appointment -or she said that she has had one scheduled on a Saturday at the hospital before -that would also work.    Please call her to r/s  [phone]

## 2020-12-11 ENCOUNTER — APPOINTMENT (OUTPATIENT)
Dept: ONCOLOGY | Facility: HOSPITAL | Age: 25
End: 2020-12-11

## 2020-12-15 ENCOUNTER — INFUSION (OUTPATIENT)
Dept: ONCOLOGY | Facility: HOSPITAL | Age: 25
End: 2020-12-15

## 2020-12-15 VITALS
SYSTOLIC BLOOD PRESSURE: 123 MMHG | OXYGEN SATURATION: 98 % | DIASTOLIC BLOOD PRESSURE: 83 MMHG | RESPIRATION RATE: 16 BRPM | HEART RATE: 82 BPM | TEMPERATURE: 97.1 F | WEIGHT: 176.2 LBS | BODY MASS INDEX: 34.41 KG/M2

## 2020-12-15 DIAGNOSIS — D50.9 IRON DEFICIENCY ANEMIA, UNSPECIFIED IRON DEFICIENCY ANEMIA TYPE: Primary | ICD-10-CM

## 2020-12-15 DIAGNOSIS — T45.4X5D ADVERSE EFFECT OF IRON AND ITS COMPOUNDS, SUBSEQUENT ENCOUNTER: ICD-10-CM

## 2020-12-15 PROCEDURE — 63710000001 PROCHLORPERAZINE MALEATE PER 10 MG: Performed by: INTERNAL MEDICINE

## 2020-12-15 PROCEDURE — 96374 THER/PROPH/DIAG INJ IV PUSH: CPT

## 2020-12-15 PROCEDURE — 25010000002 FERRIC CARBOXYMALTOSE 750 MG/15ML SOLUTION 15 ML VIAL: Performed by: INTERNAL MEDICINE

## 2020-12-15 RX ORDER — SODIUM CHLORIDE 9 MG/ML
250 INJECTION, SOLUTION INTRAVENOUS ONCE
Status: COMPLETED | OUTPATIENT
Start: 2020-12-15 | End: 2020-12-15

## 2020-12-15 RX ORDER — PROCHLORPERAZINE MALEATE 10 MG
10 TABLET ORAL ONCE
Status: COMPLETED | OUTPATIENT
Start: 2020-12-15 | End: 2020-12-15

## 2020-12-15 RX ADMIN — PROCHLORPERAZINE MALEATE 10 MG: 10 TABLET ORAL at 10:20

## 2020-12-15 RX ADMIN — SODIUM CHLORIDE 250 ML: 9 INJECTION, SOLUTION INTRAVENOUS at 10:20

## 2020-12-15 RX ADMIN — FERRIC CARBOXYMALTOSE INJECTION 750 MG: 50 INJECTION, SOLUTION INTRAVENOUS at 10:22

## 2020-12-18 ENCOUNTER — APPOINTMENT (OUTPATIENT)
Dept: ONCOLOGY | Facility: HOSPITAL | Age: 25
End: 2020-12-18

## 2020-12-22 ENCOUNTER — APPOINTMENT (OUTPATIENT)
Dept: ONCOLOGY | Facility: HOSPITAL | Age: 25
End: 2020-12-22

## 2020-12-22 RX ORDER — ACETAMINOPHEN AND CODEINE PHOSPHATE 120; 12 MG/5ML; MG/5ML
SOLUTION ORAL
Qty: 28 TABLET | Refills: 0 | Status: SHIPPED | OUTPATIENT
Start: 2020-12-22 | End: 2021-03-19

## 2020-12-28 ENCOUNTER — TELEPHONE (OUTPATIENT)
Dept: OBSTETRICS AND GYNECOLOGY | Age: 25
End: 2020-12-28
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
## 2021-03-11 DIAGNOSIS — T45.4X5D ADVERSE EFFECT OF IRON AND ITS COMPOUNDS, SUBSEQUENT ENCOUNTER: Primary | ICD-10-CM

## 2021-03-16 ENCOUNTER — APPOINTMENT (OUTPATIENT)
Dept: LAB | Facility: HOSPITAL | Age: 26
End: 2021-03-16

## 2021-03-19 ENCOUNTER — TELEPHONE (OUTPATIENT)
Dept: OBSTETRICS AND GYNECOLOGY | Age: 26
End: 2021-03-19

## 2021-03-19 RX ORDER — ACETAMINOPHEN AND CODEINE PHOSPHATE 120; 12 MG/5ML; MG/5ML
1 SOLUTION ORAL DAILY
Qty: 28 TABLET | Refills: 1 | Status: SHIPPED | OUTPATIENT
Start: 2021-03-19

## 2021-03-19 NOTE — TELEPHONE ENCOUNTER
Pt calling is having their period for about three weeks now and would like Norethindrone to help stop bleeding.  [facility] pharmacy in [place].

## 2021-03-19 NOTE — TELEPHONE ENCOUNTER
Called pt. She denies heavy bleeding but reports prolonged spotting around her menses with Kyleena. She reports bleeding stopped with micronor that NP gave and she would like to try this again. Advised her to take a pregnancy test and call back if positive. Will send rx as requested.

## 2021-04-16 ENCOUNTER — BULK ORDERING (OUTPATIENT)
Dept: CASE MANAGEMENT | Facility: OTHER | Age: 26
End: 2021-04-16

## 2021-04-16 DIAGNOSIS — Z23 IMMUNIZATION DUE: ICD-10-CM

## 2021-07-23 ENCOUNTER — TELEPHONE (OUTPATIENT)
Dept: OBSTETRICS AND GYNECOLOGY | Age: 26
End: 2021-07-23

## 2021-07-23 NOTE — TELEPHONE ENCOUNTER
Pt calls stating she feels a herpes outbreak coming on and would like valtrex to be called in. Last annual 08/25/2020. Please advise pharmacy verified first one in chart

## 2021-07-26 RX ORDER — VALACYCLOVIR HYDROCHLORIDE 500 MG/1
500 TABLET, FILM COATED ORAL 2 TIMES DAILY
Qty: 30 TABLET | Refills: 2 | Status: SHIPPED | OUTPATIENT
Start: 2021-07-26 | End: 2021-07-29

## 2021-11-02 RX ORDER — VALACYCLOVIR HYDROCHLORIDE 500 MG/1
500 TABLET, FILM COATED ORAL 2 TIMES DAILY
Qty: 30 TABLET | Refills: 2 | OUTPATIENT
Start: 2021-11-02 | End: 2021-11-05

## 2022-01-05 NOTE — TELEPHONE ENCOUNTER
Attempted to call patient. Left message      Per Dr Avalos (Doc #2) prophylactic Lovenox or blood thinner is recommend for patients in the ambulatory setting.

## 2022-01-05 NOTE — TELEPHONE ENCOUNTER
Caller: Sandra Valadez SYDNI    Relationship: Self    Best call back number: [phone]    Requested Prescriptions:   Requested Prescriptions     Pending Prescriptions Disp Refills   • enoxaparin (LOVENOX) 80 MG/0.8ML solution syringe       Sig: Inject 0.8 mL under the skin into the appropriate area as directed Every 12 (Twelve) Hours.        Pharmacy where request should be sent:  [facility] AS LISTED    Additional details provided by patient: SANDRA STATES THAT SHE HAS COVID AND SHE IS AFRAID OF GETTING A BLOOD CLOT.  SHE HAS  HISTORY OF CLOTS.    Does the patient have less than a 3 day supply:  [x] Yes  [] No    Letty TOSCANO Rep   01/05/22 08:53 EST

## 2022-04-26 NOTE — TELEPHONE ENCOUNTER
Pt notified   Cimzia Counseling:  I discussed with the patient the risks of Cimzia including but not limited to immunosuppression, allergic reactions and infections.  The patient understands that monitoring is required including a PPD at baseline and must alert us or the primary physician if symptoms of infection or other concerning signs are noted.

## 2022-06-24 ENCOUNTER — OFFICE VISIT (OUTPATIENT)
Dept: OBSTETRICS AND GYNECOLOGY | Age: 27
End: 2022-06-24

## 2022-06-24 VITALS
SYSTOLIC BLOOD PRESSURE: 128 MMHG | BODY MASS INDEX: 37.5 KG/M2 | HEIGHT: 60 IN | WEIGHT: 191 LBS | DIASTOLIC BLOOD PRESSURE: 80 MMHG

## 2022-06-24 DIAGNOSIS — Z01.411 ENCOUNTER FOR GYNECOLOGICAL EXAMINATION WITH ABNORMAL FINDING: ICD-10-CM

## 2022-06-24 DIAGNOSIS — Z30.431 IUD CHECK UP: ICD-10-CM

## 2022-06-24 DIAGNOSIS — Z12.4 SCREENING FOR MALIGNANT NEOPLASM OF CERVIX: ICD-10-CM

## 2022-06-24 DIAGNOSIS — N93.0 BLEEDING AFTER INTERCOURSE: ICD-10-CM

## 2022-06-24 PROCEDURE — 99395 PREV VISIT EST AGE 18-39: CPT | Performed by: NURSE PRACTITIONER

## 2022-06-24 PROCEDURE — 2014F MENTAL STATUS ASSESS: CPT | Performed by: NURSE PRACTITIONER

## 2022-06-24 PROCEDURE — 99213 OFFICE O/P EST LOW 20 MIN: CPT | Performed by: NURSE PRACTITIONER

## 2022-06-24 PROCEDURE — 3008F BODY MASS INDEX DOCD: CPT | Performed by: NURSE PRACTITIONER

## 2022-06-24 RX ORDER — VENLAFAXINE HYDROCHLORIDE 75 MG/1
75 CAPSULE, EXTENDED RELEASE ORAL DAILY
COMMUNITY
Start: 2022-05-18 | End: 2022-11-14

## 2022-06-24 NOTE — PROGRESS NOTES
[facility]   Obstetrics and Gynecology       2022    Patient: Sandra Valadez          MR#:[id number]    History of Present Illness    Chief Complaint   Patient presents with   • Gynecologic Exam     C/o bleeding with intercourse has iud. (Had ultrasound today) Last time this happened was after she had pap that was normal but Dr Mittal did a colpo and she had patches of abnormal cells so she was concerned.       27 y.o. female  who presents for annual exam and evaluation of an episode of bleeding after intercourse. Last pap  NILM. She had Kyleena IUD inserted 2020. She doesn't have normal periods with the IUD. 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 Has four kids at home.     Studies reviewed:  TVUS WNL, IUD in proper position    No LMP recorded (lmp unknown). (Menstrual status: Other).  Obstetric History:  OB History        5    Para   4    Term   4       0    AB   1    Living   4       SAB   1    IAB   0    Ectopic   0    Molar   0    Multiple   0    Live Births   4               Menstrual History:     No LMP recorded (lmp unknown). (Menstrual status: Other).       Sexual History:       ________________________________________  Patient Active Problem List   Diagnosis   • HSV (herpes simplex virus) anogenital infection   • Elevated prolactin level   • Urinary tract infection in mother during pregnancy, antepartum   • Tachycardia   • Iron deficiency anemia during pregnancy   • Adverse effect of iron and its compounds, subsequent encounter   • Lupus anticoagulant with hypercoagulable state (HCC)   • Anticoagulated on heparin   • Family history of breast cancer   • History of pulmonary embolism   • Iron deficiency anemia     Past Medical History:   Diagnosis Date   • Abnormal Pap smear of cervix    • Anemia     during pregnancy, received iron infusions    • Chlamydia     at age 14 ,   • H/O Depressive disorder    • Herpes     last  outbreak    • History of anxiety    • History of urinary tract infection    • Pulmonary embolism (HCC) 2020    during pregnancy     Past Surgical History:   Procedure Laterality Date   •  SECTION  , , 2015    x3    •  SECTION N/A 2020    Procedure:  SECTION REPEAT;  Surgeon: Noemi Mittal MD;  Location: [facility] LABOR DELIVERY;  Service: Obstetrics/Gynecology;  Laterality: N/A;     Social History     Tobacco Use   Smoking Status Never Smoker   Smokeless Tobacco Never Used     Family History   Problem Relation Age of Onset   • Diabetes Maternal Uncle    • Breast cancer Paternal Grandmother    • Breast cancer Paternal Aunt    • Breast cancer Paternal Aunt    • Thyroid disease Mother    • Birth defects Neg Hx      Prior to Admission medications    Medication Sig Start Date End Date Taking? Authorizing Provider   buPROPion XL (WELLBUTRIN XL) 300 MG 24 hr tablet TAKE 1 TABLET BY MOUTH 1 TIME EACH DAY. 3/29/22  Yes Matilde Chow MD   levonorgestrel (Kyleena) 19.5 MG intrauterine device IUD 1 each by Intrauterine route 1 (One) Time.   Yes Matilde Chow MD   venlafaxine XR (EFFEXOR-XR) 75 MG 24 hr capsule Take 75 mg by mouth Daily. 22 Yes Matilde Chow MD   enoxaparin (LOVENOX) 80 MG/0.8ML solution syringe Inject 0.8 mL under the skin into the appropriate area as directed Every 12 (Twelve) Hours. 20   Jessica Ho MD PhD   ferrous gluconate 324 (37.5 Fe) MG tablet tablet Take 324 mg by mouth Daily With Breakfast.    Matilde Chow MD   norethindrone (Ortho Micronor) 0.35 MG tablet Take 1 tablet by mouth Daily. 3/19/21   Noemi Mittal MD   ondansetron ODT (ZOFRAN-ODT) 4 MG disintegrating tablet ONE EVERY 8 HOURS AS NEEDED FOR NAUSEA 20   Matilde Chow MD   Prenatal Vit-Fe Fumarate-FA (PRENATAL, CLASSIC, VITAMIN) 28-0.8 MG tablet tablet Take 1 tablet by mouth Daily.    Matilde Chow MD      ________________________________________    Current contraception: IUD  History of abnormal Pap smear: no  Family history of uterine or ovarian cancer: no  Family History of colon cancer/colon polyps: no  History of abnormal mammogram: no  History of abnormal lipids: no    The following portions of the patient's history were reviewed and updated as appropriate: allergies, current medications, past family history, past medical history, past social history, past surgical history, and problem list.    Review of Systems   Constitutional: Negative for activity change, appetite change, chills, fatigue and fever.   Respiratory: Negative for cough and shortness of breath.    Cardiovascular: Negative for chest pain.   Gastrointestinal: Negative for constipation, diarrhea, nausea and vomiting.   Genitourinary: Positive for vaginal bleeding. Negative for dysuria, flank pain, genital sores, hematuria and menstrual problem.            Objective   Physical Exam  Constitutional:       Appearance: Normal appearance.   HENT:      Head: Normocephalic and atraumatic.      Nose: Nose normal.      Mouth/Throat:      Mouth: Mucous membranes are moist.   Pulmonary:      Effort: Pulmonary effort is normal.   Chest:   Breasts:      Right: Normal. No mass or nipple discharge.      Left: Normal. No mass or nipple discharge.       Abdominal:      General: Abdomen is flat.      Palpations: Abdomen is soft.   Genitourinary:     General: Normal vulva.      Exam position: Lithotomy position.      Labia:         Right: No rash, tenderness or lesion.         Left: No rash, tenderness or lesion.       Vagina: Normal. No signs of injury.      Cervix: Normal.      Uterus: Normal.       Adnexa: Right adnexa normal and left adnexa normal.      Rectum: Normal.   Musculoskeletal:         General: Normal range of motion.      Cervical back: Normal range of motion.   Skin:     General: Skin is warm and dry.   Neurological:      Mental Status: She is  "alert.   Psychiatric:         Mood and Affect: Mood normal.         Behavior: Behavior normal.         /80   Ht 152.4 cm (60\")   Wt 86.6 kg (191 lb)   LMP  (LMP Unknown)   Breastfeeding No   BMI 37.30 kg/m²    BP Readings from Last 3 Encounters:   06/24/22 128/80   12/15/20 123/83   12/02/20 108/72      Wt Readings from Last 3 Encounters:   06/24/22 86.6 kg (191 lb)   12/15/20 79.9 kg (176 lb 3.2 oz)   12/02/20 80.1 kg (176 lb 9.6 oz)        BMI: Estimated body mass index is 37.3 kg/m² as calculated from the following:    Height as of this encounter: 152.4 cm (60\").    Weight as of this encounter: 86.6 kg (191 lb).    Counseling:  --Nutrition: Stressed importance of moderation and caloric balance, stressed fresh fruit and vegetables  --Exercise: Stressed the importance of regular exercise. 3-5 times weekly   - Discussed screening mammogram recommendations.   --Discussed benefits of screening colonoscopy- age 45 unless FH  --Discussed pap smear screening recommendations             Assessment:  Diagnoses and all orders for this visit:    1. Encounter for gynecological examination with abnormal finding    2. Bleeding after intercourse  -     NuSwab VG+ - Swab, Cervix    3. IUD check up    4. Screening for malignant neoplasm of cervix  -     IGP,rfx Aptima HPV All Pth        Plan:  Return for Annual physical.    AGUSTÍN Donald  6/24/2022 15:16 EDT   "

## 2022-06-27 LAB
A VAGINAE DNA VAG QL NAA+PROBE: NORMAL SCORE
BVAB2 DNA VAG QL NAA+PROBE: NORMAL SCORE
C ALBICANS DNA VAG QL NAA+PROBE: NEGATIVE
C GLABRATA DNA VAG QL NAA+PROBE: NEGATIVE
C TRACH DNA VAG QL NAA+PROBE: NEGATIVE
CONV .: NORMAL
CYTOLOGIST CVX/VAG CYTO: NORMAL
CYTOLOGY CVX/VAG DOC CYTO: NORMAL
CYTOLOGY CVX/VAG DOC THIN PREP: NORMAL
DX ICD CODE: NORMAL
HIV 1 & 2 AB SER-IMP: NORMAL
MEGA1 DNA VAG QL NAA+PROBE: NORMAL SCORE
N GONORRHOEA DNA VAG QL NAA+PROBE: NEGATIVE
OTHER STN SPEC: NORMAL
STAT OF ADQ CVX/VAG CYTO-IMP: NORMAL
T VAGINALIS DNA VAG QL NAA+PROBE: NEGATIVE

## 2022-06-28 NOTE — TELEPHONE ENCOUNTER
Called pt regarding her L&D visit and cancelled appts. She was evaluated and did not evidence of PTL. Also discussed her cancelled appt and need for visit asap. Patient notes she has issue with  and has not been able to schedule appt. Advised pt she needs appt asap. Also recommend consult with anesthesia on L&D due to her anticoagulation. Advised pt she would need to go to L&D and request consult as they do not schedule outpatient visits. Offered pt appt here at 2:00 or 3:00 and she declined. Stressed importance of attending regular visits. Patient is also due for tdap and advised of this. Pt declined appts offered today.   ED Brief Sign Out Note    Patient signed out from outgoing resident.    Plan:      Updates:    ED Course as of 06/28/22 0540   Mon Jun 27, 2022 2234 Blood test notable for hypokalemia to 2.3, will replete PO,  AST 41, mild leukocytosis 12.2 without elevated ANC, nonelevated alcohol [RU]   2236 COVID negative [RU]   2253 CT head negative for acute intracranial process [RU]   2322 Patient cleared for psychiatric admission, certificate completed and placed in chart [RU]      ED Course User Index  [RU] David Chang MD         Disposition        Awaiting Placement         Linsey Woodard MD  Resident  06/28/22 0537

## 2022-09-13 NOTE — TELEPHONE ENCOUNTER
----- Message from Noemi Mittal MD sent at 11/23/2019  3:15 PM EST -----  Call Sandra, her routine prenatal blood tests are normal   Progress Note - Cardiology   Twan Dates 57 y o  male MRN: [id number]  Unit/Bed#: [facility] 418-01 Encounter: [id number]    Assessment/Plan  61M with PMH of CAD (recent admission for STEMI following VT/VF arrest coronary angiogram showed moderate 3v CAD, complicated by cardiogenic shock requiring IABP, LVEF 45%, presented after syncopal episode found to be VF which was treated with ICD  Repeat [facility] showed 3vd s/p CABG on 9/12       #CAD s/p CABG  Patient with VF x2 recently  [facility] this admission showed 3v disease and s/p CABG on 9/12   -continue aspirin, atorvastatin  -continue metoprolol  -continue amiodarone 200mg q8hr after CABG    #Cardiomyopathy  LVEF 40% due to ischemic cardiomyopathy    -milrinone 0 13 mcg/kg/min-- wean as tolerated  -lasix IV 40mg daily  #VT/VF s/p ICD shock  Syncope with collapse and patient found to have VF which was terminated by ICD shock  He was on amiodarone last admission but it was not continued  -ischemic evaluation with [facility]  If similar disease will ask EP to evaluate for antiarrythmic therapy  -continue home metoprolol      Subjective/Objective     Subjective: patient feeling well today  Denies chest pain or shortness of breath  He is sitting up in the chair         Vitals: /75 (BP Location: Left arm)   Pulse 77   Temp 98 2 °F (36 8 °C) (Core)   Resp 22   Wt 96 1 kg (211 lb 13 8 oz)   SpO2 94%   BMI 27 20 kg/m²   Vitals:    09/13/22 0517 09/13/22 0600   Weight: 73 8 kg (162 lb 11 2 oz) 96 1 kg (211 lb 13 8 oz)     Orthostatic Blood Pressures    Flowsheet Row Most Recent Value   Blood Pressure 117/75 filed at 09/13/2022 0800   Patient Position - Orthostatic VS Sitting filed at 09/13/2022 0800            Intake/Output Summary (Last 24 hours) at 9/13/2022 0851  Last data filed at 9/13/2022 0600  Gross per 24 hour   Intake 5419 45 ml   Output 1670 ml   Net 3749 45 ml       Invasive Devices  Report    Central Venous Catheter Line  Duration           CVC Central Lines 09/12/22 Triple <1 day    Introducer 09/12/22 <1 day          Peripheral Intravenous Line  Duration           Peripheral IV 09/10/22 Left;Proximal;Ventral (anterior) Forearm 3 days          Arterial Line  Duration           Arterial Line 09/12/22 Right Radial <1 day          Line  Duration           Pacer Wires <1 day    Pacer Wires <1 day          Drain  Duration           Chest Tube 1 Left Pleural 32 Fr  <1 day    Chest Tube 2 Posterior;Mediastinal 32 Fr  <1 day    Chest Tube 3 Anterior;Mediastinal 32 Fr  <1 day    Urethral Catheter Non-latex; Temperature probe 16 Fr  <1 day                Physical Exam:  Constitutional:       General: He is not in acute distress  NECK: central line catheter in right neck  Cardiovascular:      Rate and Rhythm: Normal rate and regular rhythm  Pulses: Normal pulses  Heart sounds: No murmur heard  CHEST: sternotomy dressing in place  Pulmonary:      Effort: Pulmonary effort is normal  No respiratory distress  Breath sounds: No wheezing or rales  Abdominal:      General: Abdomen is flat  There is no distension  Tenderness: There is no abdominal tenderness  Musculoskeletal:         General: No swelling  Skin:     General: Skin is warm and dry  Neurological:      General: No focal deficit present  Mental Status: He is alert  Psychiatric:         Mood and Affect: Mood normal         Lab Results: I have personally reviewed pertinent lab results  Imaging: I have personally reviewed pertinent reports

## 2024-04-10 ENCOUNTER — TELEPHONE (OUTPATIENT)
Dept: OBSTETRICS AND GYNECOLOGY | Age: 29
End: 2024-04-10
Payer: COMMERCIAL

## 2024-04-10 NOTE — TELEPHONE ENCOUNTER
Patient calling stating she has had Kyleena IUD since 9/16/20 & c/o low libido & issues with weight. Pt interested in removal & asking for recommendations on alternate BC options. Pt stating she does not want something that will cause weight gain. Pt also stating she has a hx of blood clots/PE & was told that she only had certain options to choose from. I offered the pt an appt in office to discuss but she declined for now. Please advise.

## 2024-04-10 NOTE — TELEPHONE ENCOUNTER
Reviewed notes and she is due for annual. Recommend she book appt and can review options at that time.

## 2024-04-15 ENCOUNTER — OFFICE VISIT (OUTPATIENT)
Dept: OBSTETRICS AND GYNECOLOGY | Age: 29
End: 2024-04-15
Payer: COMMERCIAL

## 2024-04-15 VITALS
SYSTOLIC BLOOD PRESSURE: 120 MMHG | DIASTOLIC BLOOD PRESSURE: 80 MMHG | BODY MASS INDEX: 30.19 KG/M2 | WEIGHT: 181.2 LBS | HEIGHT: 65 IN

## 2024-04-15 DIAGNOSIS — N92.1 IRREGULAR INTERMENSTRUAL BLEEDING: ICD-10-CM

## 2024-04-15 DIAGNOSIS — Z86.711 HISTORY OF PULMONARY EMBOLISM: ICD-10-CM

## 2024-04-15 DIAGNOSIS — N93.0 POSTCOITAL BLEEDING: Primary | ICD-10-CM

## 2024-04-15 PROCEDURE — 99214 OFFICE O/P EST MOD 30 MIN: CPT | Performed by: NURSE PRACTITIONER

## 2024-04-15 NOTE — PROGRESS NOTES
"Subjective   Sandra Valadez is a 28 y.o. female is being seen today for   Chief Complaint   Patient presents with    Vaginal Bleeding     C/o : patient coming in for irregular bleeding & US   Patient stated \" she had woke up on 04/10/2024 to pain starting from butt to lower abdominal pain \".    .    History of Present Illness    Patient of Dr Mittal  Here to discuss intermenstrual spotting as well as some PCB  Neither of these issues are new but have been ongoing  States she doesn't have regular menses but has spotting of some kind most weeks   Normal pap in July but does have a history of abnormal paps  No new partners   States her kyleena has been in place for 4 years (9/2020)  She has also noticed a decrease in sex drive with kyleena  Previously loved nuvaring but can no longer take estrogen due to history of PE    The following portions of the patient's history were reviewed and updated as appropriate: allergies, current medications, past family history, past medical history, past social history, past surgical history and problem list.    /80 (BP Location: Left arm, Patient Position: Sitting, Cuff Size: Large Adult)   Ht 165.1 cm (65\")   Wt 82.2 kg (181 lb 3.2 oz)   BMI 30.15 kg/m²         Review of Systems   Constitutional: Negative.    HENT: Negative.     Eyes: Negative.    Respiratory: Negative.     Cardiovascular: Negative.    Gastrointestinal: Negative.    Endocrine: Negative.    Genitourinary:  Positive for menstrual problem.   Musculoskeletal: Negative.    Skin: Negative.    Allergic/Immunologic: Negative.    Neurological: Negative.    Hematological: Negative.    Psychiatric/Behavioral: Negative.         Objective   Physical Exam  Constitutional:       Appearance: She is well-developed.   Cardiovascular:      Rate and Rhythm: Normal rate and regular rhythm.   Pulmonary:      Effort: Pulmonary effort is normal.   Abdominal:      General: Bowel sounds are normal. There is no distension.      " Palpations: Abdomen is soft.      Tenderness: There is no abdominal tenderness.   Skin:     General: Skin is warm and dry.   Neurological:      Mental Status: She is alert and oriented to person, place, and time.   Psychiatric:         Behavior: Behavior normal.           Assessment & Plan   Diagnoses and all orders for this visit:    1. Postcoital bleeding (Primary)  -     Chlamydia trachomatis, Neisseria gonorrhoeae, Trichomonas vaginalis, PCR - Urine, Vagina    2. Irregular intermenstrual bleeding    3. History of pulmonary embolism      Ultrasound done- normal with IUD in place  Discussed with patient- she is interested in removing kyleena and placing a paragard.  Hopeful that with paragard menses would be more regular.  We did discuss that irregular bleeding still could occur, as well as PCB since this has been a longstanding issue.  Check gc/chl  Can check benefits for paragard and replace kyleena with paragard once approved           Total time spent today with Sandra  was 30 minutes (level 4).  Greater than 50% of the time was spent coordinating care, answering her questions and counseling regarding pathophysiology of her presenting problem along with plans for any diagnositc work-up and treatment.

## 2024-04-17 LAB
C TRACH RRNA SPEC QL NAA+PROBE: NEGATIVE
N GONORRHOEA RRNA SPEC QL NAA+PROBE: NEGATIVE
T VAGINALIS RRNA SPEC QL NAA+PROBE: NEGATIVE

## 2024-10-07 ENCOUNTER — OFFICE VISIT (OUTPATIENT)
Dept: OBSTETRICS AND GYNECOLOGY | Age: 29
End: 2024-10-07
Payer: COMMERCIAL

## 2024-10-07 VITALS
HEIGHT: 65 IN | SYSTOLIC BLOOD PRESSURE: 122 MMHG | DIASTOLIC BLOOD PRESSURE: 64 MMHG | BODY MASS INDEX: 31.49 KG/M2 | WEIGHT: 189 LBS

## 2024-10-07 DIAGNOSIS — Z11.51 SCREENING FOR HUMAN PAPILLOMAVIRUS (HPV): ICD-10-CM

## 2024-10-07 DIAGNOSIS — Z01.419 WELL FEMALE EXAM WITH ROUTINE GYNECOLOGICAL EXAM: Primary | ICD-10-CM

## 2024-10-07 DIAGNOSIS — T83.32XA INTRAUTERINE CONTRACEPTIVE DEVICE THREADS LOST, INITIAL ENCOUNTER: ICD-10-CM

## 2024-10-07 DIAGNOSIS — Z80.3 FAMILY HISTORY OF BREAST CANCER: ICD-10-CM

## 2024-10-07 DIAGNOSIS — Z12.4 SCREENING FOR MALIGNANT NEOPLASM OF CERVIX: ICD-10-CM

## 2024-10-07 DIAGNOSIS — Z97.5 IUD (INTRAUTERINE DEVICE) IN PLACE: ICD-10-CM

## 2024-10-07 PROCEDURE — 99395 PREV VISIT EST AGE 18-39: CPT

## 2024-10-07 PROCEDURE — 99213 OFFICE O/P EST LOW 20 MIN: CPT

## 2024-10-07 RX ORDER — DEXTROAMPHETAMINE SACCHARATE, AMPHETAMINE ASPARTATE MONOHYDRATE, DEXTROAMPHETAMINE SULFATE AND AMPHETAMINE SULFATE 3.75; 3.75; 3.75; 3.75 MG/1; MG/1; MG/1; MG/1
15 CAPSULE, EXTENDED RELEASE ORAL
COMMUNITY

## 2024-10-07 RX ORDER — BACLOFEN 10 MG/1
10 TABLET ORAL
COMMUNITY
Start: 2024-09-09

## 2024-10-07 NOTE — PROGRESS NOTES
Subjective     History of Present Illness    Chief Complaint   Patient presents with    Gynecologic Exam     Ae- last pap 2023 Neg, Hpv Neg cc: unable to feel iud strings        Sandra Valadez is a 29 y.o. female who presents for annual exam.  C/o not feeling IUD strings. States she can usually feel them.   Kyleena IUD, inserted 2020. No menses with this, sometimes light spotting.   Declines STD testing.   Considering having another child, was wondering if a 5th  would be okay.   Family hx breast cancer, pt's adriano genetic testing was negative.  Social - Got  in August     Relevant data reviewed:  SCANNED - LABS (2020)   US Non-ob Transvaginal (10/07/2024 16:34)   US Non-ob Transvaginal (04/15/2024 10:29)     Obstetric History:  OB History          5    Para   4    Term   4       0    AB   1    Living   4         SAB   1    IAB   0    Ectopic   0    Molar   0    Multiple   0    Live Births   4               Menstrual History:     No LMP recorded. Patient has had an implant.           Current contraception: IUD  History of abnormal Pap smear: yes - JENNA 1  Received Gardasil immunization: no  Perform regular self breast exam: yes -    Family history of uterine or ovarian cancer: no  Family History of colon cancer: no  Family history of breast cancer: yes - paternal aunts x 2, paternal grandmother, - pt's adriano genetic testing negative    PAP: done today  Mammogram: not indicated  Colonoscopy: not indicated  DEXA: not indicated    Exercise: moderately active  Calcium/Vitamin D: adequate intake    The following portions of the patient's history were reviewed and updated as appropriate: allergies, current medications, past family history, past medical history, past social history, past surgical history, and problem list.    Review of Systems  A comprehensive review of systems was negative except for: Genitourinary: positive for pt cannot feel IUD strings       Objective  "  Physical Exam    /64   Ht 165.1 cm (65\")   Wt 85.7 kg (189 lb)   BMI 31.45 kg/m²      General: alert, appears stated age, cooperative, and no distress   Heart: regular rate and rhythm, S1, S2 normal, no murmur, click, rub or gallop   Lungs: clear to auscultation bilaterally   Abdomen: soft, non-tender, without masses or organomegaly   Breast: inspection negative, no nipple discharge or bleeding, no masses or nodularity palpable   External genitalia/Vulva: External genitalia including bartholin's glands, Urethra, [place]'s gland and urethra meatus are normal and Bladder appears normal without significant prolapse    Vagina: normal mucosa, normal discharge   Cervix: no lesions and IUD strings are NOT visible on exam   Uterus: normal size and non-tender   Adnexa: normal adnexa and no mass, fullness, tenderness   Neurologic: Alert and Oriented x3   Psychiatric: Normal affect, judgement, and mood       Assessment & Plan   Diagnoses and all orders for this visit:    1. Well female exam with routine gynecological exam (Primary)  -     IGP, Apt HPV,rfx 16 / 18,45    2. Screening for human papillomavirus (HPV)  -     IGP, Apt HPV,rfx 16 / 18,45    3. Screening for malignant neoplasm of cervix  -     IGP, Apt HPV,rfx 16 / 18,45    4. Family history of breast cancer    5. Intrauterine contraceptive device threads lost, initial encounter    6. IUD (intrauterine device) in place          PAP smear with HPV co-testing completed today  Will call patient with results and treat accordingly.   All questions answered.  Breast self exam technique reviewed and patient encouraged to perform self-exam monthly.  Physical activity and regular exercise encouraged.  Discussed healthy lifestyle modifications.  Discussed calcium and vitamin D needs to prevent osteoporosis.  IUD strings lost / IUD in place - TVUS completed today after not visualizing IUD strings on physical exam.  IUD appears in the correct position but is difficult to " visualize due to anteflexed uterine position.  Images were compared to TVUS from 4/15/2024 and IUD appears to be in the same position as then.  I will also send report in today's note to Dr. Mittal for review, and contact pt if sooner f/u is needed.  Discussed with patient that IUD appears to be in place.   Plan to follow-up in 8 months, around 2025, to discuss birth control as IUD will  in 2025.  Return in 1 year for annual exam.    Return for 8 months (2025) gyn f/u - birth control consult, 1 yr Annual Exam.

## 2024-10-10 ENCOUNTER — TELEPHONE (OUTPATIENT)
Dept: OBSTETRICS AND GYNECOLOGY | Age: 29
End: 2024-10-10
Payer: COMMERCIAL

## 2024-10-10 NOTE — TELEPHONE ENCOUNTER
Called patient and left voicemail.  Was calling to schedule next appointments.  And inform patient that Dr. Mittal reviewed the ultrasound imaging and agrees that her IUD is in normal position.    Edwige Castillo PA-C

## 2024-10-14 LAB
CYTOLOGIST CVX/VAG CYTO: NORMAL
CYTOLOGY CVX/VAG DOC CYTO: NORMAL
CYTOLOGY CVX/VAG DOC THIN PREP: NORMAL
DX ICD CODE: NORMAL
HPV I/H RISK 4 DNA CVX QL PROBE+SIG AMP: NEGATIVE
Lab: NORMAL
Lab: NORMAL
OTHER STN SPEC: NORMAL
STAT OF ADQ CVX/VAG CYTO-IMP: NORMAL

## 2024-11-05 ENCOUNTER — OFFICE VISIT (OUTPATIENT)
Dept: OBSTETRICS AND GYNECOLOGY | Age: 29
End: 2024-11-05
Payer: COMMERCIAL

## 2024-11-05 VITALS
DIASTOLIC BLOOD PRESSURE: 84 MMHG | HEIGHT: 65 IN | SYSTOLIC BLOOD PRESSURE: 130 MMHG | WEIGHT: 187 LBS | BODY MASS INDEX: 31.16 KG/M2

## 2024-11-05 DIAGNOSIS — Z30.432 ENCOUNTER FOR IUD REMOVAL: Primary | ICD-10-CM

## 2024-11-05 PROCEDURE — 58301 REMOVE INTRAUTERINE DEVICE: CPT | Performed by: PHYSICIAN ASSISTANT

## 2024-11-05 RX ORDER — DROSPIRENONE 4 MG/1
1 TABLET, FILM COATED ORAL DAILY
Qty: 28 TABLET | Refills: 0 | COMMUNITY
Start: 2024-11-05

## 2024-11-05 NOTE — PROGRESS NOTES
"Subjective     Chief Complaint   Patient presents with    Procedure     Iud removal --unable to see strings.       Sandra Valadez is a 29 y.o.  whose LMP is No LMP recorded. Patient has had an implant. presents for iud removal    She has noted weight gain and hair loss   Has some bleeding after IC as well    She has a paragard       No Additional Complaints Reported    The following portions of the patient's history were reviewed and updated as appropriate:no additional history reviewed, vital signs, allergies, current medications, past medical history, past social history, past surgical history, and problem list      Review of Systems   Genitourinary:positive for iud removal     Objective      /84   Ht 165.1 cm (65\")   Wt 84.8 kg (187 lb)   BMI 31.12 kg/m²     Physical Exam    General:   alert, comfortable, and no distress   Heart: Not performed today   Lungs: Not performed today.   Breast: Not performed today   Neck: Not performed today   Abdomen: Not performed today   CVA: Not performed today   Pelvis: External genitalia: normal general appearance  Vaginal: normal mucosa without prolapse or lesions  Cervix: normal appearance and IUD string not seen   Extremities: Not performed today   Neurologic: negative   Psychiatric: Normal affect, judgement, and mood   IUD Removal    Date of procedure:  2024    Risks and benefits discussed? yes  All questions answered? yes  Consents given by The patient  Reason for removal: Side effect: bleeding after IC        Procedure documentation:    A speculum was placed in order to view the cervix.  .  The IUD string was not easily seen.  The string able to be pulled down with the iud remover and then was grasped and the IUD was removed without difficulty.  The IUD did not appear to be adherent to the uterine cavity. It was removed intact.    She tolerated the procedure without any difficulty.     Post procedure instructions: Patient notified to call with heavy " bleeding, fever or increasing pain.    Follow up needed: prn     Lab Review   Labs: No data reviewed    Imaging   No data reviewed    Assessment & Plan     ASSESSMENT  1. Encounter for IUD removal          PLAN  1. IUD removed without incident. She has a pargard her at the office but she is not ready to proceed with insertion. She is open to trying the POP. She is not a candidate for combo OCP d/t h/o PE. She has considered another pregnancy and I have suggested she discuss this in further detail with Dr Mittal. She is high risk given her h/o blood clot and she has had 4 csections.     2. Medications prescribed this encounter:        New Medications Ordered This Visit   Medications    Drospirenone (Slynd) 4 MG tablet     Sig: Take 1 tablet by mouth Daily.     Dispense:  28 tablet     Refill:  0     Order Specific Question:   Lot Number?     Answer:   [id number]     Order Specific Question:   Expiration Date?     Answer:   11/30/2026     Order Specific Question:   Quantity     Answer:   1           Follow up: DIPESH Dong  11/5/2024

## 2024-12-04 ENCOUNTER — OFFICE VISIT (OUTPATIENT)
Dept: OBSTETRICS AND GYNECOLOGY | Age: 29
End: 2024-12-04
Payer: COMMERCIAL

## 2024-12-04 VITALS
DIASTOLIC BLOOD PRESSURE: 72 MMHG | HEIGHT: 65 IN | SYSTOLIC BLOOD PRESSURE: 122 MMHG | BODY MASS INDEX: 31.39 KG/M2 | WEIGHT: 188.4 LBS

## 2024-12-04 DIAGNOSIS — Z30.430 ENCOUNTER FOR IUD INSERTION: Primary | ICD-10-CM

## 2024-12-04 LAB
B-HCG UR QL: NEGATIVE
EXPIRATION DATE: NORMAL
INTERNAL NEGATIVE CONTROL: NEGATIVE
INTERNAL POSITIVE CONTROL: POSITIVE
Lab: NORMAL

## 2024-12-04 RX ORDER — TIRZEPATIDE 2.5 MG/.5ML
2.5 INJECTION, SOLUTION SUBCUTANEOUS
COMMUNITY
Start: 2024-11-27

## 2024-12-04 RX ORDER — ULIPRISTAL ACETATE 30 MG/1
TABLET ORAL
COMMUNITY
Start: 2024-11-19

## 2024-12-04 RX ORDER — COPPER 313.4 MG/1
INTRAUTERINE DEVICE INTRAUTERINE ONCE
Status: COMPLETED | OUTPATIENT
Start: 2024-12-04 | End: 2024-12-04

## 2024-12-04 RX ADMIN — COPPER: 313.4 INTRAUTERINE DEVICE INTRAUTERINE at 11:34

## 2024-12-04 NOTE — PROGRESS NOTES
IUD Insertion    No LMP recorded. Patient has had an implant.  No recent unprotected IC or concerns for STDs  All questions answered    Risks and benefits discussed? Yes  All questions answered? Yes  Consents given by The patient  Written consent obtained? Yes  Time out was performed prior to procedure.     Procedure documentation:     Urine pregnancy test was done and was NEGATIVE .  The risks (including infection,  bleeding, pain, and uterine perforation) and benefits of the procedure were explained to the patient and Written informed consent was obtained.    After verifying the patient had a low probability of being pregnant and met the criteria for insertion, a sterile speculum has placed and the cervix was cleansed with an antiseptic solution.  Vaginal discharge was scant.  The anterior lip of the cervix was grasped with an allis and the uterine cavity was gently sounded. There was no difficulty passing the sound through the cervix.  Cervical dilation did not need to be performed prior to placing the IUD.  The uterus was anteverted and sounded to 9 cms.  The ParaGard was then prepared per the manufacturers instructions.    The Paraguard was advanced through the cervical canal until the upper edge of the flange was flush with the external cervix.  The insertion kecia was held in place while the insertion tube was withdrawn.  I waited 10-15 seconds for the arms of the IUS to fully open and the devise to seat in the cavity.  The kecia was removed first followed by the insertion tube.. The string was cut 2 cms in length.  Bleeding from the cervix was scant.    She tolerated the procedure without any difficulty.  Transvaginal ultrasound after insertion confirmed IUD in correct position.    Post procedure instructions:             It was reviewed that the Mirena will not alter the timing of when she bleeds but it may decrease the quantity of flow and cramps.  Roughly 30% of people will be amenorrheic over time.  Spontaneous expulsion rate of 1-2% was also discussed.  If she has any issue with fever or excessive bleeding or pain she is to call the office immediately.  Encouraged condoms if any new partners for STD protection.    Advised pelvic rest for 2 weeks       Follow up 6 weeks for IUD check and AE

## 2025-03-05 RX ORDER — ESTRADIOL 0.1 MG/G
CREAM VAGINAL
Qty: 42.5 G | Refills: 12 | Status: SHIPPED | OUTPATIENT
Start: 2025-03-05

## (undated) DEVICE — ANTIBACTERIAL UNDYED BRAIDED (POLYGLACTIN 910), SYNTHETIC ABSORBABLE SUTURE: Brand: COATED VICRYL

## (undated) DEVICE — SUT MNCRYL PLS ANTIB UD 4/0 PS2 18IN

## (undated) DEVICE — 3M(TM) TEGADERM(TM) TRANSPARENT FILM DRESSING FRAME STYLE 1627: Brand: 3M™ TEGADERM™

## (undated) DEVICE — SUT GUT CHRM 0 CT 27IN 914H

## (undated) DEVICE — KENDALL SCD EXPRESS SLEEVES, KNEE LENGTH, MEDIUM: Brand: KENDALL SCD

## (undated) DEVICE — SOL IRR H2O BTL 1000ML STRL

## (undated) DEVICE — GLV SURG BIOGEL LTX PF 6 1/2